# Patient Record
Sex: FEMALE | URBAN - METROPOLITAN AREA
[De-identification: names, ages, dates, MRNs, and addresses within clinical notes are randomized per-mention and may not be internally consistent; named-entity substitution may affect disease eponyms.]

---

## 2017-01-01 ENCOUNTER — HOME CARE VISIT (OUTPATIENT)
Dept: HOSPICE | Facility: HOSPICE | Age: 82
End: 2017-01-01
Payer: MEDICARE

## 2017-01-01 ENCOUNTER — APPOINTMENT (OUTPATIENT)
Dept: RADIOLOGY | Facility: MEDICAL CENTER | Age: 82
DRG: 871 | End: 2017-01-01
Attending: INTERNAL MEDICINE
Payer: MEDICARE

## 2017-01-01 ENCOUNTER — HOSPICE ADMISSION (OUTPATIENT)
Dept: HOSPICE | Facility: HOSPICE | Age: 82
End: 2017-01-01
Payer: MEDICARE

## 2017-01-01 ENCOUNTER — APPOINTMENT (OUTPATIENT)
Dept: RADIOLOGY | Facility: MEDICAL CENTER | Age: 82
DRG: 871 | End: 2017-01-01
Attending: HOSPITALIST
Payer: MEDICARE

## 2017-01-01 ENCOUNTER — APPOINTMENT (OUTPATIENT)
Dept: RADIOLOGY | Facility: MEDICAL CENTER | Age: 82
DRG: 871 | End: 2017-01-01
Payer: MEDICARE

## 2017-01-01 ENCOUNTER — RESOLUTE PROFESSIONAL BILLING HOSPITAL PROF FEE (OUTPATIENT)
Dept: HOSPITALIST | Facility: MEDICAL CENTER | Age: 82
End: 2017-01-01
Payer: MEDICARE

## 2017-01-01 ENCOUNTER — APPOINTMENT (OUTPATIENT)
Dept: RADIOLOGY | Facility: MEDICAL CENTER | Age: 82
DRG: 871 | End: 2017-01-01
Attending: EMERGENCY MEDICINE
Payer: MEDICARE

## 2017-01-01 ENCOUNTER — HOSPITAL ENCOUNTER (INPATIENT)
Facility: MEDICAL CENTER | Age: 82
LOS: 23 days | DRG: 871 | End: 2017-07-14
Attending: EMERGENCY MEDICINE | Admitting: HOSPITALIST
Payer: MEDICARE

## 2017-01-01 VITALS
HEIGHT: 63 IN | BODY MASS INDEX: 25.69 KG/M2 | HEART RATE: 79 BPM | TEMPERATURE: 95 F | RESPIRATION RATE: 22 BRPM | WEIGHT: 145 LBS

## 2017-01-01 VITALS
HEART RATE: 75 BPM | OXYGEN SATURATION: 95 % | RESPIRATION RATE: 18 BRPM | TEMPERATURE: 97.1 F | HEIGHT: 63 IN | BODY MASS INDEX: 25.86 KG/M2 | DIASTOLIC BLOOD PRESSURE: 55 MMHG | WEIGHT: 145.94 LBS | SYSTOLIC BLOOD PRESSURE: 133 MMHG

## 2017-01-01 VITALS — HEART RATE: 67 BPM | RESPIRATION RATE: 21 BRPM | TEMPERATURE: 96.8 F

## 2017-01-01 VITALS — TEMPERATURE: 96.8 F | RESPIRATION RATE: 16 BRPM | HEART RATE: 76 BPM

## 2017-01-01 VITALS — RESPIRATION RATE: 16 BRPM

## 2017-01-01 DIAGNOSIS — A41.9 SEPSIS, DUE TO UNSPECIFIED ORGANISM: ICD-10-CM

## 2017-01-01 LAB
AFP-TM SERPL-MCNC: 4 NG/ML (ref 0–9)
ALBUMIN SERPL BCP-MCNC: 1.7 G/DL (ref 3.2–4.9)
ALBUMIN SERPL BCP-MCNC: 1.7 G/DL (ref 3.2–4.9)
ALBUMIN SERPL BCP-MCNC: 1.8 G/DL (ref 3.2–4.9)
ALBUMIN SERPL BCP-MCNC: 1.9 G/DL (ref 3.2–4.9)
ALBUMIN SERPL BCP-MCNC: 1.9 G/DL (ref 3.2–4.9)
ALBUMIN SERPL BCP-MCNC: 2 G/DL (ref 3.2–4.9)
ALBUMIN SERPL BCP-MCNC: 2.1 G/DL (ref 3.2–4.9)
ALBUMIN SERPL BCP-MCNC: 2.1 G/DL (ref 3.2–4.9)
ALBUMIN SERPL BCP-MCNC: 2.2 G/DL (ref 3.2–4.9)
ALBUMIN SERPL BCP-MCNC: 2.2 G/DL (ref 3.2–4.9)
ALBUMIN SERPL BCP-MCNC: 2.3 G/DL (ref 3.2–4.9)
ALBUMIN SERPL BCP-MCNC: 2.6 G/DL (ref 3.2–4.9)
ALBUMIN SERPL BCP-MCNC: 2.7 G/DL (ref 3.2–4.9)
ALBUMIN SERPL BCP-MCNC: 2.8 G/DL (ref 3.2–4.9)
ALBUMIN SERPL BCP-MCNC: 2.8 G/DL (ref 3.2–4.9)
ALBUMIN SERPL BCP-MCNC: 2.9 G/DL (ref 3.2–4.9)
ALBUMIN SERPL BCP-MCNC: 3 G/DL (ref 3.2–4.9)
ALBUMIN SERPL BCP-MCNC: 3 G/DL (ref 3.2–4.9)
ALBUMIN SERPL BCP-MCNC: 3.1 G/DL (ref 3.2–4.9)
ALBUMIN/GLOB SERPL: 0.3 G/DL
ALBUMIN/GLOB SERPL: 0.4 G/DL
ALBUMIN/GLOB SERPL: 0.5 G/DL
ALBUMIN/GLOB SERPL: 0.7 G/DL
ALBUMIN/GLOB SERPL: 0.8 G/DL
ALBUMIN/GLOB SERPL: 0.9 G/DL
ALP SERPL-CCNC: 103 U/L (ref 30–99)
ALP SERPL-CCNC: 103 U/L (ref 30–99)
ALP SERPL-CCNC: 107 U/L (ref 30–99)
ALP SERPL-CCNC: 108 U/L (ref 30–99)
ALP SERPL-CCNC: 111 U/L (ref 30–99)
ALP SERPL-CCNC: 111 U/L (ref 30–99)
ALP SERPL-CCNC: 112 U/L (ref 30–99)
ALP SERPL-CCNC: 113 U/L (ref 30–99)
ALP SERPL-CCNC: 114 U/L (ref 30–99)
ALP SERPL-CCNC: 116 U/L (ref 30–99)
ALP SERPL-CCNC: 117 U/L (ref 30–99)
ALP SERPL-CCNC: 120 U/L (ref 30–99)
ALP SERPL-CCNC: 129 U/L (ref 30–99)
ALP SERPL-CCNC: 129 U/L (ref 30–99)
ALP SERPL-CCNC: 130 U/L (ref 30–99)
ALP SERPL-CCNC: 136 U/L (ref 30–99)
ALP SERPL-CCNC: 137 U/L (ref 30–99)
ALP SERPL-CCNC: 151 U/L (ref 30–99)
ALP SERPL-CCNC: 172 U/L (ref 30–99)
ALP SERPL-CCNC: 96 U/L (ref 30–99)
ALT SERPL-CCNC: 103 U/L (ref 2–50)
ALT SERPL-CCNC: 104 U/L (ref 2–50)
ALT SERPL-CCNC: 115 U/L (ref 2–50)
ALT SERPL-CCNC: 119 U/L (ref 2–50)
ALT SERPL-CCNC: 126 U/L (ref 2–50)
ALT SERPL-CCNC: 127 U/L (ref 2–50)
ALT SERPL-CCNC: 136 U/L (ref 2–50)
ALT SERPL-CCNC: 153 U/L (ref 2–50)
ALT SERPL-CCNC: 45 U/L (ref 2–50)
ALT SERPL-CCNC: 53 U/L (ref 2–50)
ALT SERPL-CCNC: 58 U/L (ref 2–50)
ALT SERPL-CCNC: 60 U/L (ref 2–50)
ALT SERPL-CCNC: 60 U/L (ref 2–50)
ALT SERPL-CCNC: 61 U/L (ref 2–50)
ALT SERPL-CCNC: 66 U/L (ref 2–50)
ALT SERPL-CCNC: 70 U/L (ref 2–50)
ALT SERPL-CCNC: 78 U/L (ref 2–50)
ALT SERPL-CCNC: 88 U/L (ref 2–50)
ALT SERPL-CCNC: 93 U/L (ref 2–50)
ALT SERPL-CCNC: 95 U/L (ref 2–50)
AMMONIA PLAS-SCNC: 31 UMOL/L (ref 11–45)
AMMONIA PLAS-SCNC: 34 UMOL/L (ref 11–45)
AMMONIA PLAS-SCNC: 35 UMOL/L (ref 11–45)
AMMONIA PLAS-SCNC: 43 UMOL/L (ref 11–45)
AMMONIA PLAS-SCNC: 46 UMOL/L (ref 11–45)
AMMONIA PLAS-SCNC: 52 UMOL/L (ref 11–45)
AMORPH CRY #/AREA URNS HPF: PRESENT /HPF
AMORPH CRY #/AREA URNS HPF: PRESENT /HPF
AMYLASE FLD-CCNC: 14 U/L
ANION GAP SERPL CALC-SCNC: 11 MMOL/L (ref 0–11.9)
ANION GAP SERPL CALC-SCNC: 11 MMOL/L (ref 0–11.9)
ANION GAP SERPL CALC-SCNC: 12 MMOL/L (ref 0–11.9)
ANION GAP SERPL CALC-SCNC: 12 MMOL/L (ref 0–11.9)
ANION GAP SERPL CALC-SCNC: 4 MMOL/L (ref 0–11.9)
ANION GAP SERPL CALC-SCNC: 5 MMOL/L (ref 0–11.9)
ANION GAP SERPL CALC-SCNC: 6 MMOL/L (ref 0–11.9)
ANION GAP SERPL CALC-SCNC: 7 MMOL/L (ref 0–11.9)
ANION GAP SERPL CALC-SCNC: 7 MMOL/L (ref 0–11.9)
ANION GAP SERPL CALC-SCNC: 8 MMOL/L (ref 0–11.9)
ANION GAP SERPL CALC-SCNC: 9 MMOL/L (ref 0–11.9)
ANION GAP SERPL CALC-SCNC: 9 MMOL/L (ref 0–11.9)
ANISOCYTOSIS BLD QL SMEAR: ABNORMAL
APPEARANCE FLD: NORMAL
APPEARANCE FLD: NORMAL
APPEARANCE UR: ABNORMAL
APPEARANCE UR: ABNORMAL
AST SERPL-CCNC: 103 U/L (ref 12–45)
AST SERPL-CCNC: 104 U/L (ref 12–45)
AST SERPL-CCNC: 114 U/L (ref 12–45)
AST SERPL-CCNC: 116 U/L (ref 12–45)
AST SERPL-CCNC: 132 U/L (ref 12–45)
AST SERPL-CCNC: 133 U/L (ref 12–45)
AST SERPL-CCNC: 136 U/L (ref 12–45)
AST SERPL-CCNC: 155 U/L (ref 12–45)
AST SERPL-CCNC: 47 U/L (ref 12–45)
AST SERPL-CCNC: 55 U/L (ref 12–45)
AST SERPL-CCNC: 55 U/L (ref 12–45)
AST SERPL-CCNC: 69 U/L (ref 12–45)
AST SERPL-CCNC: 70 U/L (ref 12–45)
AST SERPL-CCNC: 78 U/L (ref 12–45)
AST SERPL-CCNC: 84 U/L (ref 12–45)
AST SERPL-CCNC: 84 U/L (ref 12–45)
AST SERPL-CCNC: 85 U/L (ref 12–45)
AST SERPL-CCNC: 88 U/L (ref 12–45)
AST SERPL-CCNC: 93 U/L (ref 12–45)
AST SERPL-CCNC: 96 U/L (ref 12–45)
BACTERIA #/AREA URNS HPF: ABNORMAL /HPF
BACTERIA #/AREA URNS HPF: ABNORMAL /HPF
BACTERIA BLD CULT: ABNORMAL
BACTERIA BLD CULT: NORMAL
BACTERIA FLD AEROBE CULT: NORMAL
BACTERIA FLD AEROBE CULT: NORMAL
BACTERIA UR CULT: NORMAL
BACTERIA WND AEROBE CULT: NORMAL
BASO STIPL BLD QL SMEAR: NORMAL
BASOPHILS # BLD AUTO: 0 % (ref 0–1.8)
BASOPHILS # BLD AUTO: 0.1 % (ref 0–1.8)
BASOPHILS # BLD AUTO: 0.2 % (ref 0–1.8)
BASOPHILS # BLD AUTO: 0.3 % (ref 0–1.8)
BASOPHILS # BLD AUTO: 0.3 % (ref 0–1.8)
BASOPHILS # BLD AUTO: 0.4 % (ref 0–1.8)
BASOPHILS # BLD AUTO: 0.6 % (ref 0–1.8)
BASOPHILS # BLD: 0 K/UL (ref 0–0.12)
BASOPHILS # BLD: 0.02 K/UL (ref 0–0.12)
BASOPHILS # BLD: 0.02 K/UL (ref 0–0.12)
BASOPHILS # BLD: 0.04 K/UL (ref 0–0.12)
BASOPHILS # BLD: 0.05 K/UL (ref 0–0.12)
BASOPHILS # BLD: 0.05 K/UL (ref 0–0.12)
BASOPHILS # BLD: 0.08 K/UL (ref 0–0.12)
BILIRUB CONJ SERPL-MCNC: 5.9 MG/DL (ref 0.1–0.5)
BILIRUB INDIRECT SERPL-MCNC: 4.3 MG/DL (ref 0–1)
BILIRUB SERPL-MCNC: 10.2 MG/DL (ref 0.1–1.5)
BILIRUB SERPL-MCNC: 6.3 MG/DL (ref 0.1–1.5)
BILIRUB SERPL-MCNC: 6.7 MG/DL (ref 0.1–1.5)
BILIRUB SERPL-MCNC: 6.8 MG/DL (ref 0.1–1.5)
BILIRUB SERPL-MCNC: 6.9 MG/DL (ref 0.1–1.5)
BILIRUB SERPL-MCNC: 7 MG/DL (ref 0.1–1.5)
BILIRUB SERPL-MCNC: 7.5 MG/DL (ref 0.1–1.5)
BILIRUB SERPL-MCNC: 7.6 MG/DL (ref 0.1–1.5)
BILIRUB SERPL-MCNC: 7.9 MG/DL (ref 0.1–1.5)
BILIRUB SERPL-MCNC: 8 MG/DL (ref 0.1–1.5)
BILIRUB SERPL-MCNC: 8.1 MG/DL (ref 0.1–1.5)
BILIRUB SERPL-MCNC: 8.2 MG/DL (ref 0.1–1.5)
BILIRUB SERPL-MCNC: 8.3 MG/DL (ref 0.1–1.5)
BILIRUB SERPL-MCNC: 8.5 MG/DL (ref 0.1–1.5)
BILIRUB SERPL-MCNC: 8.5 MG/DL (ref 0.1–1.5)
BILIRUB SERPL-MCNC: 8.6 MG/DL (ref 0.1–1.5)
BILIRUB SERPL-MCNC: 8.8 MG/DL (ref 0.1–1.5)
BILIRUB SERPL-MCNC: 9.5 MG/DL (ref 0.1–1.5)
BILIRUB UR QL CFM: NEGATIVE
BILIRUB UR QL STRIP.AUTO: NEGATIVE
BNP SERPL-MCNC: 310 PG/ML (ref 0–100)
BODY FLD TYPE: NORMAL
BUN SERPL-MCNC: 26 MG/DL (ref 8–22)
BUN SERPL-MCNC: 29 MG/DL (ref 8–22)
BUN SERPL-MCNC: 31 MG/DL (ref 8–22)
BUN SERPL-MCNC: 32 MG/DL (ref 8–22)
BUN SERPL-MCNC: 33 MG/DL (ref 8–22)
BUN SERPL-MCNC: 36 MG/DL (ref 8–22)
BUN SERPL-MCNC: 38 MG/DL (ref 8–22)
BUN SERPL-MCNC: 39 MG/DL (ref 8–22)
BUN SERPL-MCNC: 39 MG/DL (ref 8–22)
BUN SERPL-MCNC: 40 MG/DL (ref 8–22)
BUN SERPL-MCNC: 42 MG/DL (ref 8–22)
BUN SERPL-MCNC: 43 MG/DL (ref 8–22)
BUN SERPL-MCNC: 45 MG/DL (ref 8–22)
BUN SERPL-MCNC: 48 MG/DL (ref 8–22)
BUN SERPL-MCNC: 48 MG/DL (ref 8–22)
BUN SERPL-MCNC: 52 MG/DL (ref 8–22)
BUN SERPL-MCNC: 55 MG/DL (ref 8–22)
BUN SERPL-MCNC: 57 MG/DL (ref 8–22)
BUN SERPL-MCNC: 60 MG/DL (ref 8–22)
BUN SERPL-MCNC: 63 MG/DL (ref 8–22)
BUN SERPL-MCNC: 69 MG/DL (ref 8–22)
BUN SERPL-MCNC: 84 MG/DL (ref 8–22)
C DIFF DNA SPEC QL NAA+PROBE: NEGATIVE
C DIFF TOX A+B STL QL IA: NEGATIVE
C DIFF TOX GENS STL QL NAA+PROBE: NEGATIVE
CALCIUM SERPL-MCNC: 7.7 MG/DL (ref 8.5–10.5)
CALCIUM SERPL-MCNC: 8 MG/DL (ref 8.5–10.5)
CALCIUM SERPL-MCNC: 8.1 MG/DL (ref 8.5–10.5)
CALCIUM SERPL-MCNC: 8.1 MG/DL (ref 8.5–10.5)
CALCIUM SERPL-MCNC: 8.2 MG/DL (ref 8.5–10.5)
CALCIUM SERPL-MCNC: 8.3 MG/DL (ref 8.5–10.5)
CALCIUM SERPL-MCNC: 8.3 MG/DL (ref 8.5–10.5)
CALCIUM SERPL-MCNC: 8.5 MG/DL (ref 8.5–10.5)
CALCIUM SERPL-MCNC: 8.5 MG/DL (ref 8.5–10.5)
CALCIUM SERPL-MCNC: 8.6 MG/DL (ref 8.5–10.5)
CALCIUM SERPL-MCNC: 8.7 MG/DL (ref 8.5–10.5)
CALCIUM SERPL-MCNC: 8.8 MG/DL (ref 8.5–10.5)
CALCIUM SERPL-MCNC: 8.9 MG/DL (ref 8.5–10.5)
CALCIUM SERPL-MCNC: 9 MG/DL (ref 8.5–10.5)
CALCIUM SERPL-MCNC: 9.3 MG/DL (ref 8.5–10.5)
CFT BLD TEG: 4.3 MIN (ref 5–10)
CFT BLD TEG: 4.6 MIN (ref 5–10)
CFT BLD TEG: 5.5 MIN (ref 5–10)
CHLORIDE SERPL-SCNC: 101 MMOL/L (ref 96–112)
CHLORIDE SERPL-SCNC: 103 MMOL/L (ref 96–112)
CHLORIDE SERPL-SCNC: 104 MMOL/L (ref 96–112)
CHLORIDE SERPL-SCNC: 105 MMOL/L (ref 96–112)
CHLORIDE SERPL-SCNC: 106 MMOL/L (ref 96–112)
CHLORIDE SERPL-SCNC: 107 MMOL/L (ref 96–112)
CHLORIDE SERPL-SCNC: 108 MMOL/L (ref 96–112)
CHLORIDE SERPL-SCNC: 109 MMOL/L (ref 96–112)
CHLORIDE SERPL-SCNC: 110 MMOL/L (ref 96–112)
CHLORIDE SERPL-SCNC: 113 MMOL/L (ref 96–112)
CHLORIDE UR-SCNC: 18 MMOL/L
CHLORIDE UR-SCNC: <15 MMOL/L
CK SERPL-CCNC: 92 U/L (ref 0–154)
CK SERPL-CCNC: 96 U/L (ref 0–154)
CLOT ANGLE BLD TEG: 63 DEGREES (ref 53–72)
CLOT ANGLE BLD TEG: 63.6 DEGREES (ref 53–72)
CLOT ANGLE BLD TEG: 68.3 DEGREES (ref 53–72)
CLOT LYSIS 30M P MA LENFR BLD TEG: 0 % (ref 0–8)
CO2 SERPL-SCNC: 15 MMOL/L (ref 20–33)
CO2 SERPL-SCNC: 17 MMOL/L (ref 20–33)
CO2 SERPL-SCNC: 17 MMOL/L (ref 20–33)
CO2 SERPL-SCNC: 20 MMOL/L (ref 20–33)
CO2 SERPL-SCNC: 21 MMOL/L (ref 20–33)
CO2 SERPL-SCNC: 22 MMOL/L (ref 20–33)
CO2 SERPL-SCNC: 23 MMOL/L (ref 20–33)
CO2 SERPL-SCNC: 23 MMOL/L (ref 20–33)
CO2 SERPL-SCNC: 24 MMOL/L (ref 20–33)
CO2 SERPL-SCNC: 25 MMOL/L (ref 20–33)
CO2 SERPL-SCNC: 25 MMOL/L (ref 20–33)
CO2 SERPL-SCNC: 27 MMOL/L (ref 20–33)
COLOR FLD: YELLOW
COLOR FLD: YELLOW
COLOR UR: ABNORMAL
COLOR UR: ABNORMAL
COMMENT 1642: NORMAL
COMMENT 1642: NORMAL
CREAT SERPL-MCNC: 1.06 MG/DL (ref 0.5–1.4)
CREAT SERPL-MCNC: 1.15 MG/DL (ref 0.5–1.4)
CREAT SERPL-MCNC: 1.21 MG/DL (ref 0.5–1.4)
CREAT SERPL-MCNC: 1.31 MG/DL (ref 0.5–1.4)
CREAT SERPL-MCNC: 1.51 MG/DL (ref 0.5–1.4)
CREAT SERPL-MCNC: 1.72 MG/DL (ref 0.5–1.4)
CREAT SERPL-MCNC: 1.95 MG/DL (ref 0.5–1.4)
CREAT SERPL-MCNC: 2.04 MG/DL (ref 0.5–1.4)
CREAT SERPL-MCNC: 2.28 MG/DL (ref 0.5–1.4)
CREAT SERPL-MCNC: 2.32 MG/DL (ref 0.5–1.4)
CREAT SERPL-MCNC: 2.33 MG/DL (ref 0.5–1.4)
CREAT SERPL-MCNC: 2.34 MG/DL (ref 0.5–1.4)
CREAT SERPL-MCNC: 2.34 MG/DL (ref 0.5–1.4)
CREAT SERPL-MCNC: 2.45 MG/DL (ref 0.5–1.4)
CREAT SERPL-MCNC: 2.54 MG/DL (ref 0.5–1.4)
CREAT SERPL-MCNC: 2.57 MG/DL (ref 0.5–1.4)
CREAT SERPL-MCNC: 2.85 MG/DL (ref 0.5–1.4)
CREAT SERPL-MCNC: 2.9 MG/DL (ref 0.5–1.4)
CREAT SERPL-MCNC: 2.95 MG/DL (ref 0.5–1.4)
CREAT SERPL-MCNC: 3.06 MG/DL (ref 0.5–1.4)
CREAT SERPL-MCNC: 3.11 MG/DL (ref 0.5–1.4)
CREAT SERPL-MCNC: 3.68 MG/DL (ref 0.5–1.4)
CREAT UR-MCNC: 109.7 MG/DL
CREAT UR-MCNC: 99.7 MG/DL
CSF COMMENTS 1658: NORMAL
CSF COMMENTS 1658: NORMAL
CT.EXTRINSIC BLD ROTEM: 1.6 MIN (ref 1–3)
CT.EXTRINSIC BLD ROTEM: 2.1 MIN (ref 1–3)
CT.EXTRINSIC BLD ROTEM: 2.4 MIN (ref 1–3)
EKG IMPRESSION: NORMAL
EOSINOPHIL # BLD AUTO: 0 K/UL (ref 0–0.51)
EOSINOPHIL # BLD AUTO: 0.02 K/UL (ref 0–0.51)
EOSINOPHIL # BLD AUTO: 0.09 K/UL (ref 0–0.51)
EOSINOPHIL # BLD AUTO: 0.1 K/UL (ref 0–0.51)
EOSINOPHIL # BLD AUTO: 0.1 K/UL (ref 0–0.51)
EOSINOPHIL # BLD AUTO: 0.11 K/UL (ref 0–0.51)
EOSINOPHIL # BLD AUTO: 0.11 K/UL (ref 0–0.51)
EOSINOPHIL # BLD AUTO: 0.12 K/UL (ref 0–0.51)
EOSINOPHIL # BLD AUTO: 0.12 K/UL (ref 0–0.51)
EOSINOPHIL # BLD AUTO: 0.14 K/UL (ref 0–0.51)
EOSINOPHIL # BLD AUTO: 0.2 K/UL (ref 0–0.51)
EOSINOPHIL # BLD AUTO: 0.22 K/UL (ref 0–0.51)
EOSINOPHIL # BLD AUTO: 0.23 K/UL (ref 0–0.51)
EOSINOPHIL # BLD AUTO: 0.26 K/UL (ref 0–0.51)
EOSINOPHIL # BLD AUTO: 0.31 K/UL (ref 0–0.51)
EOSINOPHIL # BLD AUTO: 0.33 K/UL (ref 0–0.51)
EOSINOPHIL NFR BLD: 0 % (ref 0–6.9)
EOSINOPHIL NFR BLD: 0.2 % (ref 0–6.9)
EOSINOPHIL NFR BLD: 0.8 % (ref 0–6.9)
EOSINOPHIL NFR BLD: 0.8 % (ref 0–6.9)
EOSINOPHIL NFR BLD: 0.9 % (ref 0–6.9)
EOSINOPHIL NFR BLD: 1.6 % (ref 0–6.9)
EOSINOPHIL NFR BLD: 1.7 % (ref 0–6.9)
EOSINOPHIL NFR BLD: 2.5 % (ref 0–6.9)
EOSINOPHIL NFR BLD: 2.6 % (ref 0–6.9)
EOSINOPHIL NFR BLD: 2.7 % (ref 0–6.9)
EOSINOPHIL NFR BLD: 2.7 % (ref 0–6.9)
EOSINOPHIL URNS QL MICRO: NORMAL
EPI CELLS #/AREA URNS HPF: ABNORMAL /HPF
ERYTHROCYTE [DISTWIDTH] IN BLOOD BY AUTOMATED COUNT: 64.5 FL (ref 35.9–50)
ERYTHROCYTE [DISTWIDTH] IN BLOOD BY AUTOMATED COUNT: 64.8 FL (ref 35.9–50)
ERYTHROCYTE [DISTWIDTH] IN BLOOD BY AUTOMATED COUNT: 65.5 FL (ref 35.9–50)
ERYTHROCYTE [DISTWIDTH] IN BLOOD BY AUTOMATED COUNT: 65.6 FL (ref 35.9–50)
ERYTHROCYTE [DISTWIDTH] IN BLOOD BY AUTOMATED COUNT: 66.5 FL (ref 35.9–50)
ERYTHROCYTE [DISTWIDTH] IN BLOOD BY AUTOMATED COUNT: 66.7 FL (ref 35.9–50)
ERYTHROCYTE [DISTWIDTH] IN BLOOD BY AUTOMATED COUNT: 68.3 FL (ref 35.9–50)
ERYTHROCYTE [DISTWIDTH] IN BLOOD BY AUTOMATED COUNT: 68.4 FL (ref 35.9–50)
ERYTHROCYTE [DISTWIDTH] IN BLOOD BY AUTOMATED COUNT: 69.2 FL (ref 35.9–50)
ERYTHROCYTE [DISTWIDTH] IN BLOOD BY AUTOMATED COUNT: 69.6 FL (ref 35.9–50)
ERYTHROCYTE [DISTWIDTH] IN BLOOD BY AUTOMATED COUNT: 69.7 FL (ref 35.9–50)
ERYTHROCYTE [DISTWIDTH] IN BLOOD BY AUTOMATED COUNT: 70.9 FL (ref 35.9–50)
ERYTHROCYTE [DISTWIDTH] IN BLOOD BY AUTOMATED COUNT: 73.7 FL (ref 35.9–50)
ERYTHROCYTE [DISTWIDTH] IN BLOOD BY AUTOMATED COUNT: 74 FL (ref 35.9–50)
ERYTHROCYTE [DISTWIDTH] IN BLOOD BY AUTOMATED COUNT: 74.3 FL (ref 35.9–50)
ERYTHROCYTE [DISTWIDTH] IN BLOOD BY AUTOMATED COUNT: 75.3 FL (ref 35.9–50)
ERYTHROCYTE [DISTWIDTH] IN BLOOD BY AUTOMATED COUNT: 75.9 FL (ref 35.9–50)
ERYTHROCYTE [DISTWIDTH] IN BLOOD BY AUTOMATED COUNT: 76.2 FL (ref 35.9–50)
ERYTHROCYTE [DISTWIDTH] IN BLOOD BY AUTOMATED COUNT: 76.8 FL (ref 35.9–50)
ERYTHROCYTE [DISTWIDTH] IN BLOOD BY AUTOMATED COUNT: 77.7 FL (ref 35.9–50)
ERYTHROCYTE [DISTWIDTH] IN BLOOD BY AUTOMATED COUNT: 77.8 FL (ref 35.9–50)
ERYTHROCYTE [DISTWIDTH] IN BLOOD BY AUTOMATED COUNT: 78.3 FL (ref 35.9–50)
FERRITIN SERPL-MCNC: 142 NG/ML (ref 10–291)
FOLATE SERPL-MCNC: 16.7 NG/ML
GFR SERPL CREATININE-BSD FRML MDRD: 12 ML/MIN/1.73 M 2
GFR SERPL CREATININE-BSD FRML MDRD: 14 ML/MIN/1.73 M 2
GFR SERPL CREATININE-BSD FRML MDRD: 15 ML/MIN/1.73 M 2
GFR SERPL CREATININE-BSD FRML MDRD: 15 ML/MIN/1.73 M 2
GFR SERPL CREATININE-BSD FRML MDRD: 16 ML/MIN/1.73 M 2
GFR SERPL CREATININE-BSD FRML MDRD: 16 ML/MIN/1.73 M 2
GFR SERPL CREATININE-BSD FRML MDRD: 18 ML/MIN/1.73 M 2
GFR SERPL CREATININE-BSD FRML MDRD: 18 ML/MIN/1.73 M 2
GFR SERPL CREATININE-BSD FRML MDRD: 19 ML/MIN/1.73 M 2
GFR SERPL CREATININE-BSD FRML MDRD: 20 ML/MIN/1.73 M 2
GFR SERPL CREATININE-BSD FRML MDRD: 21 ML/MIN/1.73 M 2
GFR SERPL CREATININE-BSD FRML MDRD: 23 ML/MIN/1.73 M 2
GFR SERPL CREATININE-BSD FRML MDRD: 25 ML/MIN/1.73 M 2
GFR SERPL CREATININE-BSD FRML MDRD: 28 ML/MIN/1.73 M 2
GFR SERPL CREATININE-BSD FRML MDRD: 33 ML/MIN/1.73 M 2
GFR SERPL CREATININE-BSD FRML MDRD: 39 ML/MIN/1.73 M 2
GFR SERPL CREATININE-BSD FRML MDRD: 43 ML/MIN/1.73 M 2
GFR SERPL CREATININE-BSD FRML MDRD: 45 ML/MIN/1.73 M 2
GFR SERPL CREATININE-BSD FRML MDRD: 50 ML/MIN/1.73 M 2
GLOBULIN SER CALC-MCNC: 3.4 G/DL (ref 1.9–3.5)
GLOBULIN SER CALC-MCNC: 3.5 G/DL (ref 1.9–3.5)
GLOBULIN SER CALC-MCNC: 3.5 G/DL (ref 1.9–3.5)
GLOBULIN SER CALC-MCNC: 3.6 G/DL (ref 1.9–3.5)
GLOBULIN SER CALC-MCNC: 4 G/DL (ref 1.9–3.5)
GLOBULIN SER CALC-MCNC: 4 G/DL (ref 1.9–3.5)
GLOBULIN SER CALC-MCNC: 4.2 G/DL (ref 1.9–3.5)
GLOBULIN SER CALC-MCNC: 4.3 G/DL (ref 1.9–3.5)
GLOBULIN SER CALC-MCNC: 4.9 G/DL (ref 1.9–3.5)
GLOBULIN SER CALC-MCNC: 4.9 G/DL (ref 1.9–3.5)
GLOBULIN SER CALC-MCNC: 5.1 G/DL (ref 1.9–3.5)
GLOBULIN SER CALC-MCNC: 5.1 G/DL (ref 1.9–3.5)
GLOBULIN SER CALC-MCNC: 5.3 G/DL (ref 1.9–3.5)
GLOBULIN SER CALC-MCNC: 5.3 G/DL (ref 1.9–3.5)
GLOBULIN SER CALC-MCNC: 5.5 G/DL (ref 1.9–3.5)
GLOBULIN SER CALC-MCNC: 5.5 G/DL (ref 1.9–3.5)
GLOBULIN SER CALC-MCNC: 5.7 G/DL (ref 1.9–3.5)
GLOBULIN SER CALC-MCNC: 5.8 G/DL (ref 1.9–3.5)
GLOBULIN SER CALC-MCNC: 6 G/DL (ref 1.9–3.5)
GLOBULIN SER CALC-MCNC: 6.5 G/DL (ref 1.9–3.5)
GLUCOSE BLD-MCNC: 105 MG/DL (ref 65–99)
GLUCOSE FLD-MCNC: 94 MG/DL
GLUCOSE SERPL-MCNC: 100 MG/DL (ref 65–99)
GLUCOSE SERPL-MCNC: 101 MG/DL (ref 65–99)
GLUCOSE SERPL-MCNC: 103 MG/DL (ref 65–99)
GLUCOSE SERPL-MCNC: 104 MG/DL (ref 65–99)
GLUCOSE SERPL-MCNC: 106 MG/DL (ref 65–99)
GLUCOSE SERPL-MCNC: 106 MG/DL (ref 65–99)
GLUCOSE SERPL-MCNC: 107 MG/DL (ref 65–99)
GLUCOSE SERPL-MCNC: 67 MG/DL (ref 65–99)
GLUCOSE SERPL-MCNC: 74 MG/DL (ref 65–99)
GLUCOSE SERPL-MCNC: 78 MG/DL (ref 65–99)
GLUCOSE SERPL-MCNC: 80 MG/DL (ref 65–99)
GLUCOSE SERPL-MCNC: 82 MG/DL (ref 65–99)
GLUCOSE SERPL-MCNC: 86 MG/DL (ref 65–99)
GLUCOSE SERPL-MCNC: 89 MG/DL (ref 65–99)
GLUCOSE SERPL-MCNC: 90 MG/DL (ref 65–99)
GLUCOSE SERPL-MCNC: 90 MG/DL (ref 65–99)
GLUCOSE SERPL-MCNC: 91 MG/DL (ref 65–99)
GLUCOSE SERPL-MCNC: 92 MG/DL (ref 65–99)
GLUCOSE SERPL-MCNC: 93 MG/DL (ref 65–99)
GLUCOSE SERPL-MCNC: 94 MG/DL (ref 65–99)
GLUCOSE SERPL-MCNC: 96 MG/DL (ref 65–99)
GLUCOSE SERPL-MCNC: 96 MG/DL (ref 65–99)
GLUCOSE UR STRIP.AUTO-MCNC: NEGATIVE MG/DL
GLUCOSE UR STRIP.AUTO-MCNC: NEGATIVE MG/DL
GRAM STN SPEC: NORMAL
HAV IGM SERPL QL IA: NEGATIVE
HBV CORE IGM SER QL: NEGATIVE
HBV SURFACE AB SERPL IA-ACNC: <3.1 MIU/ML (ref 0–10)
HBV SURFACE AG SER QL: NEGATIVE
HCT VFR BLD AUTO: 25.1 % (ref 37–47)
HCT VFR BLD AUTO: 25.2 % (ref 37–47)
HCT VFR BLD AUTO: 25.6 % (ref 37–47)
HCT VFR BLD AUTO: 25.7 % (ref 37–47)
HCT VFR BLD AUTO: 26 % (ref 37–47)
HCT VFR BLD AUTO: 27.2 % (ref 37–47)
HCT VFR BLD AUTO: 27.5 % (ref 37–47)
HCT VFR BLD AUTO: 28.3 % (ref 37–47)
HCT VFR BLD AUTO: 28.3 % (ref 37–47)
HCT VFR BLD AUTO: 28.5 % (ref 37–47)
HCT VFR BLD AUTO: 28.6 % (ref 37–47)
HCT VFR BLD AUTO: 28.7 % (ref 37–47)
HCT VFR BLD AUTO: 28.7 % (ref 37–47)
HCT VFR BLD AUTO: 29 % (ref 37–47)
HCT VFR BLD AUTO: 29 % (ref 37–47)
HCT VFR BLD AUTO: 29.4 % (ref 37–47)
HCT VFR BLD AUTO: 29.9 % (ref 37–47)
HCT VFR BLD AUTO: 31 % (ref 37–47)
HCT VFR BLD AUTO: 33 % (ref 37–47)
HCT VFR BLD AUTO: 35 % (ref 37–47)
HCT VFR BLD AUTO: 35.7 % (ref 37–47)
HCT VFR BLD AUTO: 36.5 % (ref 37–47)
HCV AB SER QL: REACTIVE
HCV RNA SERPL NAA+PROBE-ACNC: ABNORMAL IU/ML
HCV RNA SERPL NAA+PROBE-ACNC: ABNORMAL IU/ML
HCV RNA SERPL NAA+PROBE-LOG IU: 3.4 LOG IU
HCV RNA SERPL NAA+PROBE-LOG IU: 3.5 LOG IU
HCV RNA SERPL QL NAA+PROBE: DETECTED
HCV RNA SERPL QL NAA+PROBE: DETECTED
HEMOCCULT STL QL: POSITIVE
HEPIND PLTAB  51052: POSITIVE
HGB BLD-MCNC: 10.3 G/DL (ref 12–16)
HGB BLD-MCNC: 10.8 G/DL (ref 12–16)
HGB BLD-MCNC: 10.8 G/DL (ref 12–16)
HGB BLD-MCNC: 11.4 G/DL (ref 12–16)
HGB BLD-MCNC: 7.8 G/DL (ref 12–16)
HGB BLD-MCNC: 7.8 G/DL (ref 12–16)
HGB BLD-MCNC: 8 G/DL (ref 12–16)
HGB BLD-MCNC: 8 G/DL (ref 12–16)
HGB BLD-MCNC: 8.2 G/DL (ref 12–16)
HGB BLD-MCNC: 8.4 G/DL (ref 12–16)
HGB BLD-MCNC: 8.6 G/DL (ref 12–16)
HGB BLD-MCNC: 8.8 G/DL (ref 12–16)
HGB BLD-MCNC: 8.8 G/DL (ref 12–16)
HGB BLD-MCNC: 8.9 G/DL (ref 12–16)
HGB BLD-MCNC: 9 G/DL (ref 12–16)
HGB BLD-MCNC: 9 G/DL (ref 12–16)
HGB BLD-MCNC: 9.1 G/DL (ref 12–16)
HGB BLD-MCNC: 9.2 G/DL (ref 12–16)
HGB BLD-MCNC: 9.3 G/DL (ref 12–16)
HGB BLD-MCNC: 9.6 G/DL (ref 12–16)
HISTIOCYTES NFR FLD: 1 %
HYALINE CASTS #/AREA URNS LPF: ABNORMAL /LPF
HYPOCHROMIA BLD QL SMEAR: ABNORMAL
HYPOCHROMIA BLD QL SMEAR: ABNORMAL
IMM GRANULOCYTES # BLD AUTO: 0.04 K/UL (ref 0–0.11)
IMM GRANULOCYTES # BLD AUTO: 0.04 K/UL (ref 0–0.11)
IMM GRANULOCYTES # BLD AUTO: 0.05 K/UL (ref 0–0.11)
IMM GRANULOCYTES # BLD AUTO: 0.05 K/UL (ref 0–0.11)
IMM GRANULOCYTES # BLD AUTO: 0.07 K/UL (ref 0–0.11)
IMM GRANULOCYTES # BLD AUTO: 0.08 K/UL (ref 0–0.11)
IMM GRANULOCYTES # BLD AUTO: 0.09 K/UL (ref 0–0.11)
IMM GRANULOCYTES # BLD AUTO: 0.11 K/UL (ref 0–0.11)
IMM GRANULOCYTES # BLD AUTO: 0.11 K/UL (ref 0–0.11)
IMM GRANULOCYTES NFR BLD AUTO: 0.3 % (ref 0–0.9)
IMM GRANULOCYTES NFR BLD AUTO: 0.4 % (ref 0–0.9)
IMM GRANULOCYTES NFR BLD AUTO: 0.4 % (ref 0–0.9)
IMM GRANULOCYTES NFR BLD AUTO: 0.5 % (ref 0–0.9)
IMM GRANULOCYTES NFR BLD AUTO: 0.5 % (ref 0–0.9)
IMM GRANULOCYTES NFR BLD AUTO: 0.6 % (ref 0–0.9)
IMM GRANULOCYTES NFR BLD AUTO: 0.7 % (ref 0–0.9)
IMM GRANULOCYTES NFR BLD AUTO: 0.9 % (ref 0–0.9)
IMM GRANULOCYTES NFR BLD AUTO: 0.9 % (ref 0–0.9)
INR PPP: 2.04 (ref 0.87–1.13)
INR PPP: 2.05 (ref 0.87–1.13)
INR PPP: 2.13 (ref 0.87–1.13)
INR PPP: 2.15 (ref 0.87–1.13)
INR PPP: 2.16 (ref 0.87–1.13)
INR PPP: 2.29 (ref 0.87–1.13)
INR PPP: 2.32 (ref 0.87–1.13)
IRON SATN MFR SERPL: 31 % (ref 15–55)
IRON SERPL-MCNC: 47 UG/DL (ref 40–170)
KETONES UR STRIP.AUTO-MCNC: NEGATIVE MG/DL
KETONES UR STRIP.AUTO-MCNC: NEGATIVE MG/DL
LACTATE BLD-SCNC: 1.8 MMOL/L (ref 0.5–2)
LACTATE BLD-SCNC: 1.8 MMOL/L (ref 0.5–2)
LACTATE BLD-SCNC: 1.9 MMOL/L (ref 0.5–2)
LACTATE BLD-SCNC: 1.9 MMOL/L (ref 0.5–2)
LACTATE BLD-SCNC: 2.1 MMOL/L (ref 0.5–2)
LACTATE BLD-SCNC: 2.2 MMOL/L (ref 0.5–2)
LACTATE BLD-SCNC: 2.2 MMOL/L (ref 0.5–2)
LACTATE BLD-SCNC: 2.4 MMOL/L (ref 0.5–2)
LACTATE BLD-SCNC: 2.4 MMOL/L (ref 0.5–2)
LACTATE BLD-SCNC: 2.6 MMOL/L (ref 0.5–2)
LACTATE BLD-SCNC: 2.7 MMOL/L (ref 0.5–2)
LACTATE BLD-SCNC: 2.8 MMOL/L (ref 0.5–2)
LEUKOCYTE ESTERASE UR QL STRIP.AUTO: ABNORMAL
LEUKOCYTE ESTERASE UR QL STRIP.AUTO: NEGATIVE
LIPASE SERPL-CCNC: 159 U/L (ref 11–82)
LV EJECT FRACT  99904: 60
LYMPHOCYTES # BLD AUTO: 0.14 K/UL (ref 1–4.8)
LYMPHOCYTES # BLD AUTO: 0.24 K/UL (ref 1–4.8)
LYMPHOCYTES # BLD AUTO: 0.33 K/UL (ref 1–4.8)
LYMPHOCYTES # BLD AUTO: 0.33 K/UL (ref 1–4.8)
LYMPHOCYTES # BLD AUTO: 0.34 K/UL (ref 1–4.8)
LYMPHOCYTES # BLD AUTO: 0.49 K/UL (ref 1–4.8)
LYMPHOCYTES # BLD AUTO: 0.59 K/UL (ref 1–4.8)
LYMPHOCYTES # BLD AUTO: 0.62 K/UL (ref 1–4.8)
LYMPHOCYTES # BLD AUTO: 0.67 K/UL (ref 1–4.8)
LYMPHOCYTES # BLD AUTO: 0.7 K/UL (ref 1–4.8)
LYMPHOCYTES # BLD AUTO: 0.72 K/UL (ref 1–4.8)
LYMPHOCYTES # BLD AUTO: 0.76 K/UL (ref 1–4.8)
LYMPHOCYTES # BLD AUTO: 0.77 K/UL (ref 1–4.8)
LYMPHOCYTES # BLD AUTO: 0.8 K/UL (ref 1–4.8)
LYMPHOCYTES # BLD AUTO: 0.81 K/UL (ref 1–4.8)
LYMPHOCYTES # BLD AUTO: 0.81 K/UL (ref 1–4.8)
LYMPHOCYTES # BLD AUTO: 0.89 K/UL (ref 1–4.8)
LYMPHOCYTES # BLD AUTO: 0.94 K/UL (ref 1–4.8)
LYMPHOCYTES # BLD AUTO: 1.17 K/UL (ref 1–4.8)
LYMPHOCYTES NFR BLD: 0.9 % (ref 22–41)
LYMPHOCYTES NFR BLD: 2.6 % (ref 22–41)
LYMPHOCYTES NFR BLD: 4.3 % (ref 22–41)
LYMPHOCYTES NFR BLD: 4.4 % (ref 22–41)
LYMPHOCYTES NFR BLD: 4.5 % (ref 22–41)
LYMPHOCYTES NFR BLD: 5.3 % (ref 22–41)
LYMPHOCYTES NFR BLD: 5.4 % (ref 22–41)
LYMPHOCYTES NFR BLD: 6 % (ref 22–41)
LYMPHOCYTES NFR BLD: 6.2 % (ref 22–41)
LYMPHOCYTES NFR BLD: 6.3 % (ref 22–41)
LYMPHOCYTES NFR BLD: 6.3 % (ref 22–41)
LYMPHOCYTES NFR BLD: 6.4 % (ref 22–41)
LYMPHOCYTES NFR BLD: 6.5 % (ref 22–41)
LYMPHOCYTES NFR BLD: 6.9 % (ref 22–41)
LYMPHOCYTES NFR BLD: 9.4 % (ref 22–41)
LYMPHOCYTES NFR BLD: 9.5 % (ref 22–41)
LYMPHOCYTES NFR FLD: 10 %
LYMPHOCYTES NFR FLD: 32 %
MACROCYTES BLD QL SMEAR: ABNORMAL
MAGNESIUM SERPL-MCNC: 1.8 MG/DL (ref 1.5–2.5)
MAGNESIUM SERPL-MCNC: 1.8 MG/DL (ref 1.5–2.5)
MAGNESIUM SERPL-MCNC: 1.9 MG/DL (ref 1.5–2.5)
MAGNESIUM SERPL-MCNC: 2.1 MG/DL (ref 1.5–2.5)
MAGNESIUM SERPL-MCNC: 2.3 MG/DL (ref 1.5–2.5)
MANUAL DIFF BLD: NORMAL
MCF BLD TEG: 46.7 MM (ref 50–70)
MCF BLD TEG: 51.9 MM (ref 50–70)
MCF BLD TEG: 55.8 MM (ref 50–70)
MCH RBC QN AUTO: 32.8 PG (ref 27–33)
MCH RBC QN AUTO: 32.9 PG (ref 27–33)
MCH RBC QN AUTO: 33 PG (ref 27–33)
MCH RBC QN AUTO: 33.1 PG (ref 27–33)
MCH RBC QN AUTO: 33.2 PG (ref 27–33)
MCH RBC QN AUTO: 33.3 PG (ref 27–33)
MCH RBC QN AUTO: 33.3 PG (ref 27–33)
MCH RBC QN AUTO: 33.5 PG (ref 27–33)
MCH RBC QN AUTO: 33.7 PG (ref 27–33)
MCH RBC QN AUTO: 33.7 PG (ref 27–33)
MCH RBC QN AUTO: 33.8 PG (ref 27–33)
MCH RBC QN AUTO: 33.9 PG (ref 27–33)
MCH RBC QN AUTO: 33.9 PG (ref 27–33)
MCHC RBC AUTO-ENTMCNC: 30.3 G/DL (ref 33.6–35)
MCHC RBC AUTO-ENTMCNC: 30.7 G/DL (ref 33.6–35)
MCHC RBC AUTO-ENTMCNC: 30.8 G/DL (ref 33.6–35)
MCHC RBC AUTO-ENTMCNC: 30.8 G/DL (ref 33.6–35)
MCHC RBC AUTO-ENTMCNC: 30.9 G/DL (ref 33.6–35)
MCHC RBC AUTO-ENTMCNC: 31 G/DL (ref 33.6–35)
MCHC RBC AUTO-ENTMCNC: 31 G/DL (ref 33.6–35)
MCHC RBC AUTO-ENTMCNC: 31.1 G/DL (ref 33.6–35)
MCHC RBC AUTO-ENTMCNC: 31.1 G/DL (ref 33.6–35)
MCHC RBC AUTO-ENTMCNC: 31.2 G/DL (ref 33.6–35)
MCHC RBC AUTO-ENTMCNC: 31.2 G/DL (ref 33.6–35)
MCHC RBC AUTO-ENTMCNC: 31.3 G/DL (ref 33.6–35)
MCHC RBC AUTO-ENTMCNC: 31.3 G/DL (ref 33.6–35)
MCHC RBC AUTO-ENTMCNC: 31.4 G/DL (ref 33.6–35)
MCHC RBC AUTO-ENTMCNC: 31.6 G/DL (ref 33.6–35)
MCHC RBC AUTO-ENTMCNC: 31.7 G/DL (ref 33.6–35)
MCHC RBC AUTO-ENTMCNC: 31.8 G/DL (ref 33.6–35)
MCHC RBC AUTO-ENTMCNC: 31.8 G/DL (ref 33.6–35)
MCHC RBC AUTO-ENTMCNC: 31.9 G/DL (ref 33.6–35)
MCHC RBC AUTO-ENTMCNC: 32.1 G/DL (ref 33.6–35)
MCV RBC AUTO: 103.6 FL (ref 81.4–97.8)
MCV RBC AUTO: 104.7 FL (ref 81.4–97.8)
MCV RBC AUTO: 105.4 FL (ref 81.4–97.8)
MCV RBC AUTO: 105.5 FL (ref 81.4–97.8)
MCV RBC AUTO: 105.8 FL (ref 81.4–97.8)
MCV RBC AUTO: 105.8 FL (ref 81.4–97.8)
MCV RBC AUTO: 105.9 FL (ref 81.4–97.8)
MCV RBC AUTO: 106 FL (ref 81.4–97.8)
MCV RBC AUTO: 106.2 FL (ref 81.4–97.8)
MCV RBC AUTO: 106.4 FL (ref 81.4–97.8)
MCV RBC AUTO: 106.4 FL (ref 81.4–97.8)
MCV RBC AUTO: 106.6 FL (ref 81.4–97.8)
MCV RBC AUTO: 106.7 FL (ref 81.4–97.8)
MCV RBC AUTO: 107 FL (ref 81.4–97.8)
MCV RBC AUTO: 107.1 FL (ref 81.4–97.8)
MCV RBC AUTO: 107.1 FL (ref 81.4–97.8)
MCV RBC AUTO: 107.4 FL (ref 81.4–97.8)
MCV RBC AUTO: 108.6 FL (ref 81.4–97.8)
MCV RBC AUTO: 108.7 FL (ref 81.4–97.8)
MCV RBC AUTO: 108.7 FL (ref 81.4–97.8)
MCV RBC AUTO: 108.8 FL (ref 81.4–97.8)
MCV RBC AUTO: 109.6 FL (ref 81.4–97.8)
MESOTHL CELL NFR FLD: 4 %
MICRO URNS: ABNORMAL
MICRO URNS: ABNORMAL
MONOCYTES # BLD AUTO: 0.16 K/UL (ref 0–0.85)
MONOCYTES # BLD AUTO: 0.45 K/UL (ref 0–0.85)
MONOCYTES # BLD AUTO: 0.45 K/UL (ref 0–0.85)
MONOCYTES # BLD AUTO: 0.55 K/UL (ref 0–0.85)
MONOCYTES # BLD AUTO: 0.58 K/UL (ref 0–0.85)
MONOCYTES # BLD AUTO: 0.77 K/UL (ref 0–0.85)
MONOCYTES # BLD AUTO: 0.9 K/UL (ref 0–0.85)
MONOCYTES # BLD AUTO: 1.02 K/UL (ref 0–0.85)
MONOCYTES # BLD AUTO: 1.04 K/UL (ref 0–0.85)
MONOCYTES # BLD AUTO: 1.05 K/UL (ref 0–0.85)
MONOCYTES # BLD AUTO: 1.17 K/UL (ref 0–0.85)
MONOCYTES # BLD AUTO: 1.19 K/UL (ref 0–0.85)
MONOCYTES # BLD AUTO: 1.2 K/UL (ref 0–0.85)
MONOCYTES # BLD AUTO: 1.23 K/UL (ref 0–0.85)
MONOCYTES # BLD AUTO: 1.26 K/UL (ref 0–0.85)
MONOCYTES # BLD AUTO: 1.32 K/UL (ref 0–0.85)
MONOCYTES # BLD AUTO: 1.42 K/UL (ref 0–0.85)
MONOCYTES # BLD AUTO: 1.51 K/UL (ref 0–0.85)
MONOCYTES # BLD AUTO: 1.56 K/UL (ref 0–0.85)
MONOCYTES NFR BLD AUTO: 1.7 % (ref 0–13.4)
MONOCYTES NFR BLD AUTO: 10.6 % (ref 0–13.4)
MONOCYTES NFR BLD AUTO: 10.7 % (ref 0–13.4)
MONOCYTES NFR BLD AUTO: 11.1 % (ref 0–13.4)
MONOCYTES NFR BLD AUTO: 11.4 % (ref 0–13.4)
MONOCYTES NFR BLD AUTO: 15.7 % (ref 0–13.4)
MONOCYTES NFR BLD AUTO: 3.5 % (ref 0–13.4)
MONOCYTES NFR BLD AUTO: 5.3 % (ref 0–13.4)
MONOCYTES NFR BLD AUTO: 6 % (ref 0–13.4)
MONOCYTES NFR BLD AUTO: 7 % (ref 0–13.4)
MONOCYTES NFR BLD AUTO: 7 % (ref 0–13.4)
MONOCYTES NFR BLD AUTO: 8.6 % (ref 0–13.4)
MONOCYTES NFR BLD AUTO: 8.9 % (ref 0–13.4)
MONOCYTES NFR BLD AUTO: 9.2 % (ref 0–13.4)
MONOCYTES NFR BLD AUTO: 9.5 % (ref 0–13.4)
MONOCYTES NFR BLD AUTO: 9.5 % (ref 0–13.4)
MONOCYTES NFR BLD AUTO: 9.8 % (ref 0–13.4)
MONONUC CELLS NFR FLD: 4 %
MONONUC CELLS NFR FLD: 4 %
MORPHOLOGY BLD-IMP: NORMAL
NEUTROPHILS # BLD AUTO: 10.31 K/UL (ref 2–7.15)
NEUTROPHILS # BLD AUTO: 10.53 K/UL (ref 2–7.15)
NEUTROPHILS # BLD AUTO: 11.15 K/UL (ref 2–7.15)
NEUTROPHILS # BLD AUTO: 11.24 K/UL (ref 2–7.15)
NEUTROPHILS # BLD AUTO: 11.3 K/UL (ref 2–7.15)
NEUTROPHILS # BLD AUTO: 11.57 K/UL (ref 2–7.15)
NEUTROPHILS # BLD AUTO: 11.6 K/UL (ref 2–7.15)
NEUTROPHILS # BLD AUTO: 12.01 K/UL (ref 2–7.15)
NEUTROPHILS # BLD AUTO: 12.02 K/UL (ref 2–7.15)
NEUTROPHILS # BLD AUTO: 14.77 K/UL (ref 2–7.15)
NEUTROPHILS # BLD AUTO: 16.68 K/UL (ref 2–7.15)
NEUTROPHILS # BLD AUTO: 7.08 K/UL (ref 2–7.15)
NEUTROPHILS # BLD AUTO: 7.71 K/UL (ref 2–7.15)
NEUTROPHILS # BLD AUTO: 8.76 K/UL (ref 2–7.15)
NEUTROPHILS # BLD AUTO: 8.78 K/UL (ref 2–7.15)
NEUTROPHILS # BLD AUTO: 9.34 K/UL (ref 2–7.15)
NEUTROPHILS # BLD AUTO: 9.34 K/UL (ref 2–7.15)
NEUTROPHILS # BLD AUTO: 9.83 K/UL (ref 2–7.15)
NEUTROPHILS # BLD AUTO: 9.98 K/UL (ref 2–7.15)
NEUTROPHILS NFR BLD: 71.4 % (ref 44–72)
NEUTROPHILS NFR BLD: 75.2 % (ref 44–72)
NEUTROPHILS NFR BLD: 80.3 % (ref 44–72)
NEUTROPHILS NFR BLD: 81.2 % (ref 44–72)
NEUTROPHILS NFR BLD: 81.3 % (ref 44–72)
NEUTROPHILS NFR BLD: 81.9 % (ref 44–72)
NEUTROPHILS NFR BLD: 82.2 % (ref 44–72)
NEUTROPHILS NFR BLD: 82.4 % (ref 44–72)
NEUTROPHILS NFR BLD: 82.6 % (ref 44–72)
NEUTROPHILS NFR BLD: 82.9 % (ref 44–72)
NEUTROPHILS NFR BLD: 83.4 % (ref 44–72)
NEUTROPHILS NFR BLD: 85.2 % (ref 44–72)
NEUTROPHILS NFR BLD: 87.1 % (ref 44–72)
NEUTROPHILS NFR BLD: 88.6 % (ref 44–72)
NEUTROPHILS NFR BLD: 90.2 % (ref 44–72)
NEUTROPHILS NFR BLD: 90.5 % (ref 44–72)
NEUTROPHILS NFR BLD: 92.2 % (ref 44–72)
NEUTROPHILS NFR BLD: 93 % (ref 44–72)
NEUTROPHILS NFR BLD: 93.8 % (ref 44–72)
NEUTROPHILS NFR FLD: 59 %
NEUTROPHILS NFR FLD: 86 %
NEUTS BAND NFR BLD MANUAL: 0.9 % (ref 0–10)
NEUTS BAND NFR BLD MANUAL: 1.8 % (ref 0–10)
NEUTS BAND NFR BLD MANUAL: 2.6 % (ref 0–10)
NEUTS BAND NFR BLD MANUAL: 6.1 % (ref 0–10)
NITRITE UR QL STRIP.AUTO: NEGATIVE
NITRITE UR QL STRIP.AUTO: NEGATIVE
NRBC # BLD AUTO: 0 K/UL
NRBC # BLD AUTO: 0 K/UL
NRBC # BLD AUTO: 0.02 K/UL
NRBC # BLD AUTO: 0.03 K/UL
NRBC # BLD AUTO: 0.04 K/UL
NRBC # BLD AUTO: 0.05 K/UL
NRBC # BLD AUTO: 0.11 K/UL
NRBC BLD AUTO-RTO: 0 /100 WBC
NRBC BLD AUTO-RTO: 0 /100 WBC
NRBC BLD AUTO-RTO: 0.2 /100 WBC
NRBC BLD AUTO-RTO: 0.3 /100 WBC
NRBC BLD AUTO-RTO: 0.6 /100 WBC
NUCLEAR IGG SER QL IA: NORMAL
PA AA BLD-ACNC: 5.3 %
PA ADP BLD-ACNC: 51.9 %
PATHOLOGY STUDY: ABNORMAL
PATHOLOGY STUDY: ABNORMAL
PH UR STRIP.AUTO: 7 [PH]
PH UR STRIP.AUTO: 7.5 [PH]
PHOSPHATE SERPL-MCNC: 3.5 MG/DL (ref 2.5–4.5)
PHOSPHATE SERPL-MCNC: 4.6 MG/DL (ref 2.5–4.5)
PLATELET # BLD AUTO: 100 K/UL (ref 164–446)
PLATELET # BLD AUTO: 101 K/UL (ref 164–446)
PLATELET # BLD AUTO: 17 K/UL (ref 164–446)
PLATELET # BLD AUTO: 21 K/UL (ref 164–446)
PLATELET # BLD AUTO: 23 K/UL (ref 164–446)
PLATELET # BLD AUTO: 23 K/UL (ref 164–446)
PLATELET # BLD AUTO: 24 K/UL (ref 164–446)
PLATELET # BLD AUTO: 26 K/UL (ref 164–446)
PLATELET # BLD AUTO: 26 K/UL (ref 164–446)
PLATELET # BLD AUTO: 30 K/UL (ref 164–446)
PLATELET # BLD AUTO: 30 K/UL (ref 164–446)
PLATELET # BLD AUTO: 39 K/UL (ref 164–446)
PLATELET # BLD AUTO: 52 K/UL (ref 164–446)
PLATELET # BLD AUTO: 64 K/UL (ref 164–446)
PLATELET # BLD AUTO: 76 K/UL (ref 164–446)
PLATELET # BLD AUTO: 83 K/UL (ref 164–446)
PLATELET # BLD AUTO: 89 K/UL (ref 164–446)
PLATELET # BLD AUTO: 91 K/UL (ref 164–446)
PLATELET # BLD AUTO: 92 K/UL (ref 164–446)
PLATELET # BLD AUTO: 93 K/UL (ref 164–446)
PLATELET # BLD AUTO: 95 K/UL (ref 164–446)
PLATELET # BLD AUTO: 99 K/UL (ref 164–446)
PLATELET BLD QL SMEAR: NORMAL
PLATELETS.RETICULATED NFR BLD AUTO: 16.1 K/UL (ref 0.6–13.1)
PLATELETS.RETICULATED NFR BLD AUTO: 16.8 K/UL (ref 0.6–13.1)
PLATELETS.RETICULATED NFR BLD AUTO: 17.6 K/UL (ref 0.6–13.1)
PLATELETS.RETICULATED NFR BLD AUTO: 20 K/UL (ref 0.6–13.1)
PLATELETS.RETICULATED NFR BLD AUTO: 20.2 K/UL (ref 0.6–13.1)
PLATELETS.RETICULATED NFR BLD AUTO: 21.1 K/UL (ref 0.6–13.1)
PLATELETS.RETICULATED NFR BLD AUTO: 21.9 K/UL (ref 0.6–13.1)
PMV BLD AUTO: 10.6 FL (ref 9–12.9)
PMV BLD AUTO: 10.9 FL (ref 9–12.9)
PMV BLD AUTO: 11 FL (ref 9–12.9)
PMV BLD AUTO: 11.1 FL (ref 9–12.9)
PMV BLD AUTO: 11.1 FL (ref 9–12.9)
PMV BLD AUTO: 11.2 FL (ref 9–12.9)
PMV BLD AUTO: 11.3 FL (ref 9–12.9)
PMV BLD AUTO: 11.3 FL (ref 9–12.9)
PMV BLD AUTO: 11.4 FL (ref 9–12.9)
PMV BLD AUTO: 11.5 FL (ref 9–12.9)
PMV BLD AUTO: 11.6 FL (ref 9–12.9)
PMV BLD AUTO: 11.6 FL (ref 9–12.9)
PMV BLD AUTO: 11.9 FL (ref 9–12.9)
PMV BLD AUTO: 12.1 FL (ref 9–12.9)
PMV BLD AUTO: 12.8 FL (ref 9–12.9)
PMV BLD AUTO: 12.9 FL (ref 9–12.9)
PMV BLD AUTO: 12.9 FL (ref 9–12.9)
PMV BLD AUTO: 13 FL (ref 9–12.9)
PMV BLD AUTO: 13 FL (ref 9–12.9)
PMV BLD AUTO: 13.1 FL (ref 9–12.9)
POIKILOCYTOSIS BLD QL SMEAR: NORMAL
POLYCHROMASIA BLD QL SMEAR: NORMAL
POTASSIUM SERPL-SCNC: 3.3 MMOL/L (ref 3.6–5.5)
POTASSIUM SERPL-SCNC: 3.4 MMOL/L (ref 3.6–5.5)
POTASSIUM SERPL-SCNC: 3.5 MMOL/L (ref 3.6–5.5)
POTASSIUM SERPL-SCNC: 3.6 MMOL/L (ref 3.6–5.5)
POTASSIUM SERPL-SCNC: 3.6 MMOL/L (ref 3.6–5.5)
POTASSIUM SERPL-SCNC: 3.7 MMOL/L (ref 3.6–5.5)
POTASSIUM SERPL-SCNC: 3.7 MMOL/L (ref 3.6–5.5)
POTASSIUM SERPL-SCNC: 3.8 MMOL/L (ref 3.6–5.5)
POTASSIUM SERPL-SCNC: 3.8 MMOL/L (ref 3.6–5.5)
POTASSIUM SERPL-SCNC: 3.9 MMOL/L (ref 3.6–5.5)
POTASSIUM SERPL-SCNC: 4 MMOL/L (ref 3.6–5.5)
POTASSIUM SERPL-SCNC: 4 MMOL/L (ref 3.6–5.5)
POTASSIUM SERPL-SCNC: 4.1 MMOL/L (ref 3.6–5.5)
POTASSIUM SERPL-SCNC: 4.2 MMOL/L (ref 3.6–5.5)
POTASSIUM SERPL-SCNC: 4.3 MMOL/L (ref 3.6–5.5)
POTASSIUM SERPL-SCNC: 4.5 MMOL/L (ref 3.6–5.5)
POTASSIUM UR-SCNC: 46.1 MMOL/L
POTASSIUM UR-SCNC: 79.7 MMOL/L
PROCALCITONIN SERPL-MCNC: 0.45 NG/ML
PROT FLD-MCNC: <3 G/DL
PROT SERPL-MCNC: 6.4 G/DL (ref 6–8.2)
PROT SERPL-MCNC: 6.4 G/DL (ref 6–8.2)
PROT SERPL-MCNC: 6.5 G/DL (ref 6–8.2)
PROT SERPL-MCNC: 6.5 G/DL (ref 6–8.2)
PROT SERPL-MCNC: 6.6 G/DL (ref 6–8.2)
PROT SERPL-MCNC: 6.6 G/DL (ref 6–8.2)
PROT SERPL-MCNC: 6.8 G/DL (ref 6–8.2)
PROT SERPL-MCNC: 6.8 G/DL (ref 6–8.2)
PROT SERPL-MCNC: 7.1 G/DL (ref 6–8.2)
PROT SERPL-MCNC: 7.2 G/DL (ref 6–8.2)
PROT SERPL-MCNC: 7.2 G/DL (ref 6–8.2)
PROT SERPL-MCNC: 7.3 G/DL (ref 6–8.2)
PROT SERPL-MCNC: 7.3 G/DL (ref 6–8.2)
PROT SERPL-MCNC: 7.4 G/DL (ref 6–8.2)
PROT SERPL-MCNC: 7.4 G/DL (ref 6–8.2)
PROT SERPL-MCNC: 7.6 G/DL (ref 6–8.2)
PROT SERPL-MCNC: 7.9 G/DL (ref 6–8.2)
PROT SERPL-MCNC: 8.6 G/DL (ref 6–8.2)
PROT UR QL STRIP: 50 MG/DL
PROT UR QL STRIP: >=300 MG/DL
PROT UR-MCNC: 977.9 MG/DL (ref 0–15)
PROTHROMBIN TIME: 23.7 SEC (ref 12–14.6)
PROTHROMBIN TIME: 23.8 SEC (ref 12–14.6)
PROTHROMBIN TIME: 24.5 SEC (ref 12–14.6)
PROTHROMBIN TIME: 24.7 SEC (ref 12–14.6)
PROTHROMBIN TIME: 24.8 SEC (ref 12–14.6)
PROTHROMBIN TIME: 25.9 SEC (ref 12–14.6)
PROTHROMBIN TIME: 26.2 SEC (ref 12–14.6)
RBC # BLD AUTO: 2.3 M/UL (ref 4.2–5.4)
RBC # BLD AUTO: 2.31 M/UL (ref 4.2–5.4)
RBC # BLD AUTO: 2.39 M/UL (ref 4.2–5.4)
RBC # BLD AUTO: 2.42 M/UL (ref 4.2–5.4)
RBC # BLD AUTO: 2.43 M/UL (ref 4.2–5.4)
RBC # BLD AUTO: 2.54 M/UL (ref 4.2–5.4)
RBC # BLD AUTO: 2.58 M/UL (ref 4.2–5.4)
RBC # BLD AUTO: 2.66 M/UL (ref 4.2–5.4)
RBC # BLD AUTO: 2.67 M/UL (ref 4.2–5.4)
RBC # BLD AUTO: 2.7 M/UL (ref 4.2–5.4)
RBC # BLD AUTO: 2.72 M/UL (ref 4.2–5.4)
RBC # BLD AUTO: 2.75 M/UL (ref 4.2–5.4)
RBC # BLD AUTO: 2.77 M/UL (ref 4.2–5.4)
RBC # BLD AUTO: 2.77 M/UL (ref 4.2–5.4)
RBC # BLD AUTO: 2.78 M/UL (ref 4.2–5.4)
RBC # BLD AUTO: 2.93 M/UL (ref 4.2–5.4)
RBC # BLD AUTO: 3.13 M/UL (ref 4.2–5.4)
RBC # BLD AUTO: 3.26 M/UL (ref 4.2–5.4)
RBC # BLD AUTO: 3.28 M/UL (ref 4.2–5.4)
RBC # BLD AUTO: 3.43 M/UL (ref 4.2–5.4)
RBC # FLD: <2000 CELLS/UL
RBC # FLD: <2000 CELLS/UL
RBC # URNS HPF: >150 /HPF
RBC # URNS HPF: ABNORMAL /HPF
RBC BLD AUTO: PRESENT
RBC UR QL AUTO: ABNORMAL
RBC UR QL AUTO: ABNORMAL
SIGNIFICANT IND 70042: ABNORMAL
SIGNIFICANT IND 70042: ABNORMAL
SIGNIFICANT IND 70042: NORMAL
SITE SITE: ABNORMAL
SITE SITE: ABNORMAL
SITE SITE: NORMAL
SMA IGG SER-ACNC: 30 UNITS (ref 0–19)
SMOOTH MUSCLE IGG TITR SER: ABNORMAL {TITER}
SMUDGE CELLS BLD QL SMEAR: NORMAL
SODIUM SERPL-SCNC: 130 MMOL/L (ref 135–145)
SODIUM SERPL-SCNC: 131 MMOL/L (ref 135–145)
SODIUM SERPL-SCNC: 131 MMOL/L (ref 135–145)
SODIUM SERPL-SCNC: 132 MMOL/L (ref 135–145)
SODIUM SERPL-SCNC: 133 MMOL/L (ref 135–145)
SODIUM SERPL-SCNC: 133 MMOL/L (ref 135–145)
SODIUM SERPL-SCNC: 134 MMOL/L (ref 135–145)
SODIUM SERPL-SCNC: 135 MMOL/L (ref 135–145)
SODIUM SERPL-SCNC: 136 MMOL/L (ref 135–145)
SODIUM SERPL-SCNC: 136 MMOL/L (ref 135–145)
SODIUM SERPL-SCNC: 137 MMOL/L (ref 135–145)
SODIUM SERPL-SCNC: 138 MMOL/L (ref 135–145)
SODIUM SERPL-SCNC: 139 MMOL/L (ref 135–145)
SODIUM SERPL-SCNC: 140 MMOL/L (ref 135–145)
SODIUM SERPL-SCNC: 140 MMOL/L (ref 135–145)
SODIUM SERPL-SCNC: 141 MMOL/L (ref 135–145)
SODIUM SERPL-SCNC: 141 MMOL/L (ref 135–145)
SODIUM SERPL-SCNC: 143 MMOL/L (ref 135–145)
SODIUM UR-SCNC: 11 MMOL/L
SODIUM UR-SCNC: 18 MMOL/L
SOURCE SOURCE: ABNORMAL
SOURCE SOURCE: ABNORMAL
SOURCE SOURCE: NORMAL
SP GR UR STRIP.AUTO: 1.01
SP GR UR STRIP.AUTO: 1.02
SRA 0.1IU/ML UFH SER-ACNC: 0 %
SRA 100IU/ML UFH SER-ACNC: 0 %
SRA PORCINE INTERP SER-IMP: NEGATIVE
SRA UFH SER-IMP: NORMAL
TARGETS BLD QL SMEAR: NORMAL
TEG ALGORITHM TGALG: ABNORMAL
TEG ALGORITHM TGALG: ABNORMAL
TEG ALGORITHM TGALG: NORMAL
TIBC SERPL-MCNC: 154 UG/DL (ref 250–450)
TOXIC GRANULES BLD QL SMEAR: SLIGHT
TROPONIN I SERPL-MCNC: 0.07 NG/ML (ref 0–0.04)
TROPONIN I SERPL-MCNC: 0.07 NG/ML (ref 0–0.04)
TSH SERPL DL<=0.005 MIU/L-ACNC: 3.63 UIU/ML (ref 0.3–3.7)
VANCOMYCIN SERPL-MCNC: 10.8 UG/ML
VANCOMYCIN SERPL-MCNC: 11.5 UG/ML
VANCOMYCIN SERPL-MCNC: 11.7 UG/ML
VANCOMYCIN SERPL-MCNC: 11.8 UG/ML
VANCOMYCIN SERPL-MCNC: 15 UG/ML
VANCOMYCIN SERPL-MCNC: 20.3 UG/ML
VIT B12 SERPL-MCNC: >1500 PG/ML (ref 211–911)
VIT B12 SERPL-MCNC: >1500 PG/ML (ref 211–911)
WBC # BLD AUTO: 10.7 K/UL (ref 4.8–10.8)
WBC # BLD AUTO: 10.9 K/UL (ref 4.8–10.8)
WBC # BLD AUTO: 11.5 K/UL (ref 4.8–10.8)
WBC # BLD AUTO: 12 K/UL (ref 4.8–10.8)
WBC # BLD AUTO: 12.4 K/UL (ref 4.8–10.8)
WBC # BLD AUTO: 12.5 K/UL (ref 4.8–10.8)
WBC # BLD AUTO: 12.8 K/UL (ref 4.8–10.8)
WBC # BLD AUTO: 12.9 K/UL (ref 4.8–10.8)
WBC # BLD AUTO: 13.3 K/UL (ref 4.8–10.8)
WBC # BLD AUTO: 13.5 K/UL (ref 4.8–10.8)
WBC # BLD AUTO: 13.8 K/UL (ref 4.8–10.8)
WBC # BLD AUTO: 14.3 K/UL (ref 4.8–10.8)
WBC # BLD AUTO: 15.6 K/UL (ref 4.8–10.8)
WBC # BLD AUTO: 16 K/UL (ref 4.8–10.8)
WBC # BLD AUTO: 18.8 K/UL (ref 4.8–10.8)
WBC # BLD AUTO: 8.4 K/UL (ref 4.8–10.8)
WBC # BLD AUTO: 9.2 K/UL (ref 4.8–10.8)
WBC # BLD AUTO: 9.7 K/UL (ref 4.8–10.8)
WBC # BLD AUTO: 9.9 K/UL (ref 4.8–10.8)
WBC # FLD: 29 CELLS/UL
WBC # FLD: 508 CELLS/UL
WBC #/AREA URNS HPF: ABNORMAL /HPF
WBC #/AREA URNS HPF: ABNORMAL /HPF

## 2017-01-01 PROCEDURE — 85007 BL SMEAR W/DIFF WBC COUNT: CPT

## 2017-01-01 PROCEDURE — 99232 SBSQ HOSP IP/OBS MODERATE 35: CPT | Performed by: INTERNAL MEDICINE

## 2017-01-01 PROCEDURE — A9270 NON-COVERED ITEM OR SERVICE: HCPCS | Performed by: INTERNAL MEDICINE

## 2017-01-01 PROCEDURE — 84133 ASSAY OF URINE POTASSIUM: CPT

## 2017-01-01 PROCEDURE — S9126 HOSPICE CARE, IN THE HOME, P: HCPCS

## 2017-01-01 PROCEDURE — 89051 BODY FLUID CELL COUNT: CPT

## 2017-01-01 PROCEDURE — 302146: Performed by: INTERNAL MEDICINE

## 2017-01-01 PROCEDURE — 700102 HCHG RX REV CODE 250 W/ 637 OVERRIDE(OP): Performed by: HOSPITALIST

## 2017-01-01 PROCEDURE — 700111 HCHG RX REV CODE 636 W/ 250 OVERRIDE (IP): Performed by: HOSPITALIST

## 2017-01-01 PROCEDURE — 700102 HCHG RX REV CODE 250 W/ 637 OVERRIDE(OP): Performed by: INTERNAL MEDICINE

## 2017-01-01 PROCEDURE — 85027 COMPLETE CBC AUTOMATED: CPT

## 2017-01-01 PROCEDURE — 87040 BLOOD CULTURE FOR BACTERIA: CPT | Mod: 91

## 2017-01-01 PROCEDURE — 700111 HCHG RX REV CODE 636 W/ 250 OVERRIDE (IP): Performed by: INTERNAL MEDICINE

## 2017-01-01 PROCEDURE — 80053 COMPREHEN METABOLIC PANEL: CPT

## 2017-01-01 PROCEDURE — 70450 CT HEAD/BRAIN W/O DYE: CPT

## 2017-01-01 PROCEDURE — 82550 ASSAY OF CK (CPK): CPT

## 2017-01-01 PROCEDURE — 700105 HCHG RX REV CODE 258: Performed by: INTERNAL MEDICINE

## 2017-01-01 PROCEDURE — 99233 SBSQ HOSP IP/OBS HIGH 50: CPT | Performed by: INTERNAL MEDICINE

## 2017-01-01 PROCEDURE — 770020 HCHG ROOM/CARE - TELE (206)

## 2017-01-01 PROCEDURE — 700111 HCHG RX REV CODE 636 W/ 250 OVERRIDE (IP)

## 2017-01-01 PROCEDURE — 36415 COLL VENOUS BLD VENIPUNCTURE: CPT

## 2017-01-01 PROCEDURE — 85025 COMPLETE CBC W/AUTO DIFF WBC: CPT

## 2017-01-01 PROCEDURE — 700101 HCHG RX REV CODE 250: Performed by: INTERNAL MEDICINE

## 2017-01-01 PROCEDURE — 87015 SPECIMEN INFECT AGNT CONCNTJ: CPT

## 2017-01-01 PROCEDURE — 87070 CULTURE OTHR SPECIMN AEROBIC: CPT

## 2017-01-01 PROCEDURE — 665036 HSPC NOTICE OF ELECTION NOE: Performed by: INTERNAL MEDICINE

## 2017-01-01 PROCEDURE — 303105 HCHG CATHETER EXTRA

## 2017-01-01 PROCEDURE — 700105 HCHG RX REV CODE 258: Performed by: STUDENT IN AN ORGANIZED HEALTH CARE EDUCATION/TRAINING PROGRAM

## 2017-01-01 PROCEDURE — 82570 ASSAY OF URINE CREATININE: CPT

## 2017-01-01 PROCEDURE — 302255 BARRIER CREAM MOISTURE BAZA PROTECT (ZINC) 5OZ: Performed by: INTERNAL MEDICINE

## 2017-01-01 PROCEDURE — 82150 ASSAY OF AMYLASE: CPT

## 2017-01-01 PROCEDURE — 0W9G3ZX DRAINAGE OF PERITONEAL CAVITY, PERCUTANEOUS APPROACH, DIAGNOSTIC: ICD-10-PCS | Performed by: RADIOLOGY

## 2017-01-01 PROCEDURE — 76705 ECHO EXAM OF ABDOMEN: CPT

## 2017-01-01 PROCEDURE — 302307 BAG SINGLE USE 2000ML COLLECT: Performed by: INTERNAL MEDICINE

## 2017-01-01 PROCEDURE — 82436 ASSAY OF URINE CHLORIDE: CPT

## 2017-01-01 PROCEDURE — 90935 HEMODIALYSIS ONE EVALUATION: CPT

## 2017-01-01 PROCEDURE — 87522 HEPATITIS C REVRS TRNSCRPJ: CPT

## 2017-01-01 PROCEDURE — G0299 HHS/HOSPICE OF RN EA 15 MIN: HCPCS

## 2017-01-01 PROCEDURE — G8991 OTHER PT/OT GOAL STATUS: HCPCS | Mod: CH

## 2017-01-01 PROCEDURE — 86235 NUCLEAR ANTIGEN ANTIBODY: CPT | Mod: 91

## 2017-01-01 PROCEDURE — 51798 US URINE CAPACITY MEASURE: CPT

## 2017-01-01 PROCEDURE — A6214 FOAM DRG > 48 SQ IN W/BORDER: HCPCS

## 2017-01-01 PROCEDURE — 84156 ASSAY OF PROTEIN URINE: CPT

## 2017-01-01 PROCEDURE — 83690 ASSAY OF LIPASE: CPT

## 2017-01-01 PROCEDURE — 80202 ASSAY OF VANCOMYCIN: CPT

## 2017-01-01 PROCEDURE — 90670 PCV13 VACCINE IM: CPT | Performed by: INTERNAL MEDICINE

## 2017-01-01 PROCEDURE — 770001 HCHG ROOM/CARE - MED/SURG/GYN PRIV*

## 2017-01-01 PROCEDURE — 700105 HCHG RX REV CODE 258: Performed by: HOSPITALIST

## 2017-01-01 PROCEDURE — 99232 SBSQ HOSP IP/OBS MODERATE 35: CPT | Performed by: HOSPITALIST

## 2017-01-01 PROCEDURE — 83735 ASSAY OF MAGNESIUM: CPT

## 2017-01-01 PROCEDURE — 92610 EVALUATE SWALLOWING FUNCTION: CPT

## 2017-01-01 PROCEDURE — 302255 BARRIER CREAM MOISTURE BAZA PROTECT: Performed by: HOSPITALIST

## 2017-01-01 PROCEDURE — 92526 ORAL FUNCTION THERAPY: CPT

## 2017-01-01 PROCEDURE — 82140 ASSAY OF AMMONIA: CPT

## 2017-01-01 PROCEDURE — 770006 HCHG ROOM/CARE - MED/SURG/GYN SEMI*

## 2017-01-01 PROCEDURE — 85347 COAGULATION TIME ACTIVATED: CPT

## 2017-01-01 PROCEDURE — 74000 DX-ABDOMEN-1 VIEW: CPT

## 2017-01-01 PROCEDURE — 5A1D60Z PERFORMANCE OF URINARY FILTRATION, MULTIPLE: ICD-10-PCS | Performed by: RADIOLOGY

## 2017-01-01 PROCEDURE — 82248 BILIRUBIN DIRECT: CPT

## 2017-01-01 PROCEDURE — 302112 WASHCLOTH,PERINEAL CARE: Performed by: INTERNAL MEDICINE

## 2017-01-01 PROCEDURE — 96367 TX/PROPH/DG ADDL SEQ IV INF: CPT

## 2017-01-01 PROCEDURE — 700102 HCHG RX REV CODE 250 W/ 637 OVERRIDE(OP): Performed by: NURSE PRACTITIONER

## 2017-01-01 PROCEDURE — 0W9G3ZX DRAINAGE OF PERITONEAL CAVITY, PERCUTANEOUS APPROACH, DIAGNOSTIC: ICD-10-PCS | Performed by: INTERNAL MEDICINE

## 2017-01-01 PROCEDURE — 81001 URINALYSIS AUTO W/SCOPE: CPT

## 2017-01-01 PROCEDURE — 700111 HCHG RX REV CODE 636 W/ 250 OVERRIDE (IP): Performed by: PHARMACIST

## 2017-01-01 PROCEDURE — 700105 HCHG RX REV CODE 258

## 2017-01-01 PROCEDURE — 83540 ASSAY OF IRON: CPT

## 2017-01-01 PROCEDURE — 700101 HCHG RX REV CODE 250: Performed by: HOSPITALIST

## 2017-01-01 PROCEDURE — 85027 COMPLETE CBC AUTOMATED: CPT | Mod: 91

## 2017-01-01 PROCEDURE — 85055 RETICULATED PLATELET ASSAY: CPT

## 2017-01-01 PROCEDURE — 85610 PROTHROMBIN TIME: CPT

## 2017-01-01 PROCEDURE — 97162 PT EVAL MOD COMPLEX 30 MIN: CPT

## 2017-01-01 PROCEDURE — 85576 BLOOD PLATELET AGGREGATION: CPT

## 2017-01-01 PROCEDURE — 97535 SELF CARE MNGMENT TRAINING: CPT

## 2017-01-01 PROCEDURE — 82272 OCCULT BLD FECES 1-3 TESTS: CPT

## 2017-01-01 PROCEDURE — A9270 NON-COVERED ITEM OR SERVICE: HCPCS | Performed by: STUDENT IN AN ORGANIZED HEALTH CARE EDUCATION/TRAINING PROGRAM

## 2017-01-01 PROCEDURE — 97167 OT EVAL HIGH COMPLEX 60 MIN: CPT

## 2017-01-01 PROCEDURE — 93005 ELECTROCARDIOGRAM TRACING: CPT | Performed by: EMERGENCY MEDICINE

## 2017-01-01 PROCEDURE — 94760 N-INVAS EAR/PLS OXIMETRY 1: CPT

## 2017-01-01 PROCEDURE — C1894 INTRO/SHEATH, NON-LASER: HCPCS

## 2017-01-01 PROCEDURE — 83550 IRON BINDING TEST: CPT

## 2017-01-01 PROCEDURE — P9047 ALBUMIN (HUMAN), 25%, 50ML: HCPCS

## 2017-01-01 PROCEDURE — 77001 FLUOROGUIDE FOR VEIN DEVICE: CPT

## 2017-01-01 PROCEDURE — 86225 DNA ANTIBODY NATIVE: CPT

## 2017-01-01 PROCEDURE — 80074 ACUTE HEPATITIS PANEL: CPT

## 2017-01-01 PROCEDURE — 84300 ASSAY OF URINE SODIUM: CPT

## 2017-01-01 PROCEDURE — 82945 GLUCOSE OTHER FLUID: CPT

## 2017-01-01 PROCEDURE — 80048 BASIC METABOLIC PNL TOTAL CA: CPT

## 2017-01-01 PROCEDURE — 99239 HOSP IP/OBS DSCHRG MGMT >30: CPT | Performed by: INTERNAL MEDICINE

## 2017-01-01 PROCEDURE — 85384 FIBRINOGEN ACTIVITY: CPT

## 2017-01-01 PROCEDURE — A9270 NON-COVERED ITEM OR SERVICE: HCPCS | Performed by: NURSE PRACTITIONER

## 2017-01-01 PROCEDURE — G8988 SELF CARE GOAL STATUS: HCPCS | Mod: CL

## 2017-01-01 PROCEDURE — 80069 RENAL FUNCTION PANEL: CPT

## 2017-01-01 PROCEDURE — G8997 SWALLOW GOAL STATUS: HCPCS | Mod: CH

## 2017-01-01 PROCEDURE — 84157 ASSAY OF PROTEIN OTHER: CPT

## 2017-01-01 PROCEDURE — 83880 ASSAY OF NATRIURETIC PEPTIDE: CPT

## 2017-01-01 PROCEDURE — 83605 ASSAY OF LACTIC ACID: CPT

## 2017-01-01 PROCEDURE — 700111 HCHG RX REV CODE 636 W/ 250 OVERRIDE (IP): Performed by: EMERGENCY MEDICINE

## 2017-01-01 PROCEDURE — 83605 ASSAY OF LACTIC ACID: CPT | Mod: 91

## 2017-01-01 PROCEDURE — 86706 HEP B SURFACE ANTIBODY: CPT

## 2017-01-01 PROCEDURE — 51702 INSERT TEMP BLADDER CATH: CPT

## 2017-01-01 PROCEDURE — 87205 SMEAR GRAM STAIN: CPT

## 2017-01-01 PROCEDURE — 86022 PLATELET ANTIBODIES: CPT | Mod: 91

## 2017-01-01 PROCEDURE — 86256 FLUORESCENT ANTIBODY TITER: CPT

## 2017-01-01 PROCEDURE — 02HV33Z INSERTION OF INFUSION DEVICE INTO SUPERIOR VENA CAVA, PERCUTANEOUS APPROACH: ICD-10-PCS | Performed by: RADIOLOGY

## 2017-01-01 PROCEDURE — 99356 PR PROLONGED SVC I/P OR OBS SETTING 1ST HOUR: CPT | Performed by: HOSPITALIST

## 2017-01-01 PROCEDURE — 87086 URINE CULTURE/COLONY COUNT: CPT

## 2017-01-01 PROCEDURE — 87493 C DIFF AMPLIFIED PROBE: CPT

## 2017-01-01 PROCEDURE — 74181 MRI ABDOMEN W/O CONTRAST: CPT

## 2017-01-01 PROCEDURE — A9270 NON-COVERED ITEM OR SERVICE: HCPCS | Performed by: HOSPITALIST

## 2017-01-01 PROCEDURE — G8990 OTHER PT/OT CURRENT STATUS: HCPCS | Mod: CH

## 2017-01-01 PROCEDURE — 700105 HCHG RX REV CODE 258: Performed by: PHARMACIST

## 2017-01-01 PROCEDURE — 700111 HCHG RX REV CODE 636 W/ 250 OVERRIDE (IP): Performed by: STUDENT IN AN ORGANIZED HEALTH CARE EDUCATION/TRAINING PROGRAM

## 2017-01-01 PROCEDURE — 307738 PARACENTESIS ABDOMINAL KIT: Performed by: INTERNAL MEDICINE

## 2017-01-01 PROCEDURE — 302112 WASHCLOTH,PERINEAL CARE: Performed by: STUDENT IN AN ORGANIZED HEALTH CARE EDUCATION/TRAINING PROGRAM

## 2017-01-01 PROCEDURE — 89190 NASAL SMEAR FOR EOSINOPHILS: CPT

## 2017-01-01 PROCEDURE — G0156 HHCP-SVS OF AIDE,EA 15 MIN: HCPCS

## 2017-01-01 PROCEDURE — 700117 HCHG RX CONTRAST REV CODE 255: Performed by: SURGERY

## 2017-01-01 PROCEDURE — 82105 ALPHA-FETOPROTEIN SERUM: CPT

## 2017-01-01 PROCEDURE — 93306 TTE W/DOPPLER COMPLETE: CPT | Mod: 26 | Performed by: INTERNAL MEDICINE

## 2017-01-01 PROCEDURE — 82607 VITAMIN B-12: CPT

## 2017-01-01 PROCEDURE — C9113 INJ PANTOPRAZOLE SODIUM, VIA: HCPCS | Performed by: INTERNAL MEDICINE

## 2017-01-01 PROCEDURE — G8996 SWALLOW CURRENT STATUS: HCPCS | Mod: CJ

## 2017-01-01 PROCEDURE — A6219 GAUZE <= 16 SQ IN W/BORDER: HCPCS

## 2017-01-01 PROCEDURE — 93306 TTE W/DOPPLER COMPLETE: CPT

## 2017-01-01 PROCEDURE — 87186 SC STD MICRODIL/AGAR DIL: CPT

## 2017-01-01 PROCEDURE — A4927 NON-STERILE GLOVES: HCPCS

## 2017-01-01 PROCEDURE — 74177 CT ABD & PELVIS W/CONTRAST: CPT

## 2017-01-01 PROCEDURE — 85025 COMPLETE CBC W/AUTO DIFF WBC: CPT | Mod: 91

## 2017-01-01 PROCEDURE — 49083 ABD PARACENTESIS W/IMAGING: CPT

## 2017-01-01 PROCEDURE — G8978 MOBILITY CURRENT STATUS: HCPCS | Mod: CL

## 2017-01-01 PROCEDURE — P9045 ALBUMIN (HUMAN), 5%, 250 ML: HCPCS | Performed by: INTERNAL MEDICINE

## 2017-01-01 PROCEDURE — A6250 SKIN SEAL PROTECT MOISTURIZR: HCPCS | Performed by: INTERNAL MEDICINE

## 2017-01-01 PROCEDURE — P9047 ALBUMIN (HUMAN), 25%, 50ML: HCPCS | Performed by: INTERNAL MEDICINE

## 2017-01-01 PROCEDURE — 36556 INSERT NON-TUNNEL CV CATH: CPT

## 2017-01-01 PROCEDURE — 90471 IMMUNIZATION ADMIN: CPT

## 2017-01-01 PROCEDURE — 3E0234Z INTRODUCTION OF SERUM, TOXOID AND VACCINE INTO MUSCLE, PERCUTANEOUS APPROACH: ICD-10-PCS | Performed by: HOSPITALIST

## 2017-01-01 PROCEDURE — 87324 CLOSTRIDIUM AG IA: CPT

## 2017-01-01 PROCEDURE — 97110 THERAPEUTIC EXERCISES: CPT

## 2017-01-01 PROCEDURE — 82728 ASSAY OF FERRITIN: CPT

## 2017-01-01 PROCEDURE — 99223 1ST HOSP IP/OBS HIGH 75: CPT | Performed by: HOSPITALIST

## 2017-01-01 PROCEDURE — 86022 PLATELET ANTIBODIES: CPT

## 2017-01-01 PROCEDURE — 306802 CATHETER,INSTAFLOW BOWEL: Performed by: STUDENT IN AN ORGANIZED HEALTH CARE EDUCATION/TRAINING PROGRAM

## 2017-01-01 PROCEDURE — 700105 HCHG RX REV CODE 258: Performed by: EMERGENCY MEDICINE

## 2017-01-01 PROCEDURE — 700102 HCHG RX REV CODE 250 W/ 637 OVERRIDE(OP): Performed by: STUDENT IN AN ORGANIZED HEALTH CARE EDUCATION/TRAINING PROGRAM

## 2017-01-01 PROCEDURE — 306588 SLEEVE,VASO CALF MED: Performed by: HOSPITALIST

## 2017-01-01 PROCEDURE — 84100 ASSAY OF PHOSPHORUS: CPT

## 2017-01-01 PROCEDURE — 76937 US GUIDE VASCULAR ACCESS: CPT

## 2017-01-01 PROCEDURE — 86038 ANTINUCLEAR ANTIBODIES: CPT

## 2017-01-01 PROCEDURE — 76775 US EXAM ABDO BACK WALL LIM: CPT

## 2017-01-01 PROCEDURE — 82962 GLUCOSE BLOOD TEST: CPT

## 2017-01-01 PROCEDURE — A5120 SKIN BARRIER, WIPE OR SWAB: HCPCS

## 2017-01-01 PROCEDURE — 84484 ASSAY OF TROPONIN QUANT: CPT

## 2017-01-01 PROCEDURE — G8979 MOBILITY GOAL STATUS: HCPCS | Mod: CJ

## 2017-01-01 PROCEDURE — 96365 THER/PROPH/DIAG IV INF INIT: CPT

## 2017-01-01 PROCEDURE — 84145 PROCALCITONIN (PCT): CPT

## 2017-01-01 PROCEDURE — 302255 BARRIER CREAM MOISTURE BAZA PROTECT: Performed by: INTERNAL MEDICINE

## 2017-01-01 PROCEDURE — 71010 DX-CHEST-PORTABLE (1 VIEW): CPT

## 2017-01-01 PROCEDURE — 99233 SBSQ HOSP IP/OBS HIGH 50: CPT | Performed by: HOSPITALIST

## 2017-01-01 PROCEDURE — 97161 PT EVAL LOW COMPLEX 20 MIN: CPT

## 2017-01-01 PROCEDURE — 82746 ASSAY OF FOLIC ACID SERUM: CPT

## 2017-01-01 PROCEDURE — 84443 ASSAY THYROID STIM HORMONE: CPT

## 2017-01-01 PROCEDURE — 83516 IMMUNOASSAY NONANTIBODY: CPT

## 2017-01-01 PROCEDURE — 87077 CULTURE AEROBIC IDENTIFY: CPT

## 2017-01-01 PROCEDURE — 97530 THERAPEUTIC ACTIVITIES: CPT

## 2017-01-01 PROCEDURE — 96366 THER/PROPH/DIAG IV INF ADDON: CPT

## 2017-01-01 PROCEDURE — G8987 SELF CARE CURRENT STATUS: HCPCS | Mod: CM

## 2017-01-01 PROCEDURE — 88112 CYTOPATH CELL ENHANCE TECH: CPT

## 2017-01-01 PROCEDURE — 99285 EMERGENCY DEPT VISIT HI MDM: CPT

## 2017-01-01 RX ORDER — ALBUMIN (HUMAN) 12.5 G/50ML
12.5 SOLUTION INTRAVENOUS
Status: DISCONTINUED | OUTPATIENT
Start: 2017-01-01 | End: 2017-01-01 | Stop reason: HOSPADM

## 2017-01-01 RX ORDER — SODIUM CHLORIDE 9 MG/ML
1000 INJECTION, SOLUTION INTRAVENOUS ONCE
Status: DISCONTINUED | OUTPATIENT
Start: 2017-01-01 | End: 2017-01-01

## 2017-01-01 RX ORDER — PHYTONADIONE 10 MG/ML
5 INJECTION, EMULSION INTRAMUSCULAR; INTRAVENOUS; SUBCUTANEOUS ONCE
Status: COMPLETED | OUTPATIENT
Start: 2017-01-01 | End: 2017-01-01

## 2017-01-01 RX ORDER — SODIUM CHLORIDE 9 MG/ML
1000 INJECTION, SOLUTION INTRAVENOUS ONCE
Status: COMPLETED | OUTPATIENT
Start: 2017-01-01 | End: 2017-01-01

## 2017-01-01 RX ORDER — FAMOTIDINE 20 MG/1
20 TABLET, FILM COATED ORAL 2 TIMES DAILY
Status: DISCONTINUED | OUTPATIENT
Start: 2017-01-01 | End: 2017-01-01

## 2017-01-01 RX ORDER — BISACODYL 10 MG
10 SUPPOSITORY, RECTAL RECTAL
Status: DISCONTINUED | OUTPATIENT
Start: 2017-01-01 | End: 2017-01-01

## 2017-01-01 RX ORDER — DOXYCYCLINE 100 MG/1
100 TABLET ORAL DAILY
Status: DISCONTINUED | OUTPATIENT
Start: 2017-01-01 | End: 2017-01-01

## 2017-01-01 RX ORDER — ACETYLCYSTEINE 200 MG/ML
600 SOLUTION ORAL; RESPIRATORY (INHALATION) EVERY 8 HOURS
Status: COMPLETED | OUTPATIENT
Start: 2017-01-01 | End: 2017-01-01

## 2017-01-01 RX ORDER — LORAZEPAM 2 MG/ML
1 CONCENTRATE ORAL EVERY 8 HOURS PRN
Qty: 15 ML | Refills: 1 | Status: SHIPPED | OUTPATIENT
Start: 2017-01-01

## 2017-01-01 RX ORDER — LACTULOSE 20 G/30ML
15 SOLUTION ORAL EVERY EVENING
Status: DISCONTINUED | OUTPATIENT
Start: 2017-01-01 | End: 2017-01-01

## 2017-01-01 RX ORDER — SODIUM CHLORIDE 9 MG/ML
500 INJECTION, SOLUTION INTRAVENOUS ONCE
Status: COMPLETED | OUTPATIENT
Start: 2017-01-01 | End: 2017-01-01

## 2017-01-01 RX ORDER — LACTULOSE 20 G/30ML
30 SOLUTION ORAL 3 TIMES DAILY
Status: DISCONTINUED | OUTPATIENT
Start: 2017-01-01 | End: 2017-01-01

## 2017-01-01 RX ORDER — SODIUM CHLORIDE 9 MG/ML
INJECTION, SOLUTION INTRAVENOUS CONTINUOUS
Status: DISCONTINUED | OUTPATIENT
Start: 2017-01-01 | End: 2017-01-01

## 2017-01-01 RX ORDER — DEXTROSE MONOHYDRATE 25 G/50ML
25 INJECTION, SOLUTION INTRAVENOUS
Status: DISCONTINUED | OUTPATIENT
Start: 2017-01-01 | End: 2017-01-01 | Stop reason: HOSPADM

## 2017-01-01 RX ORDER — ALBUMIN, HUMAN INJ 5% 5 %
25 SOLUTION INTRAVENOUS ONCE
Status: COMPLETED | OUTPATIENT
Start: 2017-01-01 | End: 2017-01-01

## 2017-01-01 RX ORDER — POTASSIUM CHLORIDE 1.5 G/1.58G
20 POWDER, FOR SOLUTION ORAL 2 TIMES DAILY
Status: COMPLETED | OUTPATIENT
Start: 2017-01-01 | End: 2017-01-01

## 2017-01-01 RX ORDER — IBUPROFEN 200 MG
200 TABLET ORAL EVERY 6 HOURS PRN
Status: ON HOLD | COMMUNITY
End: 2017-01-01

## 2017-01-01 RX ORDER — SODIUM CHLORIDE 9 MG/ML
INJECTION, SOLUTION INTRAVENOUS
Status: COMPLETED
Start: 2017-01-01 | End: 2017-01-01

## 2017-01-01 RX ORDER — ALBUMIN (HUMAN) 12.5 G/50ML
100 SOLUTION INTRAVENOUS ONCE
Status: COMPLETED | OUTPATIENT
Start: 2017-01-01 | End: 2017-01-01

## 2017-01-01 RX ORDER — POTASSIUM CHLORIDE 7.45 MG/ML
10 INJECTION INTRAVENOUS ONCE
Status: COMPLETED | OUTPATIENT
Start: 2017-01-01 | End: 2017-01-01

## 2017-01-01 RX ORDER — HEPARIN SODIUM 1000 [USP'U]/ML
3000 INJECTION, SOLUTION INTRAVENOUS; SUBCUTANEOUS
Status: DISCONTINUED | OUTPATIENT
Start: 2017-01-01 | End: 2017-01-01

## 2017-01-01 RX ORDER — OMEPRAZOLE 20 MG/1
20 CAPSULE, DELAYED RELEASE ORAL DAILY
Status: DISCONTINUED | OUTPATIENT
Start: 2017-01-01 | End: 2017-01-01

## 2017-01-01 RX ORDER — ALBUMIN (HUMAN) 12.5 G/50ML
SOLUTION INTRAVENOUS
Status: COMPLETED
Start: 2017-01-01 | End: 2017-01-01

## 2017-01-01 RX ORDER — THIAMINE MONONITRATE (VIT B1) 100 MG
100 TABLET ORAL DAILY
Status: COMPLETED | OUTPATIENT
Start: 2017-01-01 | End: 2017-01-01

## 2017-01-01 RX ORDER — POTASSIUM CHLORIDE 1.5 G/1.58G
20 POWDER, FOR SOLUTION ORAL 2 TIMES DAILY
Status: DISCONTINUED | OUTPATIENT
Start: 2017-01-01 | End: 2017-01-01

## 2017-01-01 RX ORDER — HEPARIN SODIUM 1000 [USP'U]/ML
INJECTION, SOLUTION INTRAVENOUS; SUBCUTANEOUS
Status: COMPLETED
Start: 2017-01-01 | End: 2017-01-01

## 2017-01-01 RX ORDER — LACTULOSE 20 G/30ML
15 SOLUTION ORAL 3 TIMES DAILY
Status: DISCONTINUED | OUTPATIENT
Start: 2017-01-01 | End: 2017-01-01

## 2017-01-01 RX ORDER — LACTULOSE 20 G/30ML
15 SOLUTION ORAL 2 TIMES DAILY
Status: DISCONTINUED | OUTPATIENT
Start: 2017-01-01 | End: 2017-01-01 | Stop reason: HOSPADM

## 2017-01-01 RX ORDER — MORPHINE SULFATE 100 MG/5ML
10 SOLUTION ORAL EVERY 6 HOURS PRN
Qty: 30 ML | Refills: 1 | Status: SHIPPED | OUTPATIENT
Start: 2017-01-01

## 2017-01-01 RX ORDER — POTASSIUM CHLORIDE 1.5 G/1.58G
40 POWDER, FOR SOLUTION ORAL ONCE
Status: COMPLETED | OUTPATIENT
Start: 2017-01-01 | End: 2017-01-01

## 2017-01-01 RX ORDER — OMEPRAZOLE 20 MG/1
20 CAPSULE, DELAYED RELEASE ORAL 2 TIMES DAILY
Status: DISCONTINUED | OUTPATIENT
Start: 2017-01-01 | End: 2017-01-01

## 2017-01-01 RX ORDER — PANTOPRAZOLE SODIUM 40 MG/10ML
40 INJECTION, POWDER, LYOPHILIZED, FOR SOLUTION INTRAVENOUS 2 TIMES DAILY
Status: DISCONTINUED | OUTPATIENT
Start: 2017-01-01 | End: 2017-01-01

## 2017-01-01 RX ORDER — ALPRAZOLAM 0.5 MG/1
0.5 TABLET ORAL
Status: DISCONTINUED | OUTPATIENT
Start: 2017-01-01 | End: 2017-01-01

## 2017-01-01 RX ORDER — AMOXICILLIN 250 MG
2 CAPSULE ORAL 2 TIMES DAILY
Status: DISCONTINUED | OUTPATIENT
Start: 2017-01-01 | End: 2017-01-01

## 2017-01-01 RX ORDER — SODIUM CHLORIDE 9 MG/ML
250 INJECTION, SOLUTION INTRAVENOUS
Status: DISCONTINUED | OUTPATIENT
Start: 2017-01-01 | End: 2017-01-01 | Stop reason: HOSPADM

## 2017-01-01 RX ORDER — OCTREOTIDE ACETATE 50 UG/ML
50 INJECTION, SOLUTION INTRAVENOUS; SUBCUTANEOUS ONCE
Status: COMPLETED | OUTPATIENT
Start: 2017-01-01 | End: 2017-01-01

## 2017-01-01 RX ORDER — PENTOXIFYLLINE 400 MG/1
400 TABLET, EXTENDED RELEASE ORAL DAILY
Status: DISCONTINUED | OUTPATIENT
Start: 2017-01-01 | End: 2017-01-01 | Stop reason: HOSPADM

## 2017-01-01 RX ORDER — HEPARIN SODIUM 1000 [USP'U]/ML
INJECTION, SOLUTION INTRAVENOUS; SUBCUTANEOUS
Status: DISPENSED
Start: 2017-01-01 | End: 2017-01-01

## 2017-01-01 RX ORDER — ALBUMIN (HUMAN) 12.5 G/50ML
75 SOLUTION INTRAVENOUS ONCE
Status: COMPLETED | OUTPATIENT
Start: 2017-01-01 | End: 2017-01-01

## 2017-01-01 RX ORDER — POLYETHYLENE GLYCOL 3350 17 G/17G
1 POWDER, FOR SOLUTION ORAL
Status: DISCONTINUED | OUTPATIENT
Start: 2017-01-01 | End: 2017-01-01

## 2017-01-01 RX ORDER — FOLIC ACID 1 MG/1
1 TABLET ORAL DAILY
Status: COMPLETED | OUTPATIENT
Start: 2017-01-01 | End: 2017-01-01

## 2017-01-01 RX ADMIN — LACTULOSE 15 ML: 20 SOLUTION ORAL at 20:38

## 2017-01-01 RX ADMIN — ACETYLCYSTEINE 600 MG: 200 SOLUTION ORAL; RESPIRATORY (INHALATION) at 15:42

## 2017-01-01 RX ADMIN — PENTOXIFYLLINE 400 MG: 400 TABLET, FILM COATED, EXTENDED RELEASE ORAL at 08:13

## 2017-01-01 RX ADMIN — RIFAXIMIN 550 MG: 550 TABLET ORAL at 09:17

## 2017-01-01 RX ADMIN — VANCOMYCIN HYDROCHLORIDE 600 MG: 100 INJECTION, POWDER, LYOPHILIZED, FOR SOLUTION INTRAVENOUS at 23:37

## 2017-01-01 RX ADMIN — RIFAXIMIN 550 MG: 550 TABLET ORAL at 13:03

## 2017-01-01 RX ADMIN — PENTOXIFYLLINE 400 MG: 400 TABLET, FILM COATED, EXTENDED RELEASE ORAL at 08:51

## 2017-01-01 RX ADMIN — PIPERACILLIN SODIUM AND TAZOBACTAM SODIUM 3.38 G: 3; .375 INJECTION, POWDER, FOR SOLUTION INTRAVENOUS at 16:25

## 2017-01-01 RX ADMIN — VANCOMYCIN HYDROCHLORIDE 1900 MG: 100 INJECTION, POWDER, LYOPHILIZED, FOR SOLUTION INTRAVENOUS at 14:25

## 2017-01-01 RX ADMIN — SODIUM CHLORIDE: 9 INJECTION, SOLUTION INTRAVENOUS at 14:59

## 2017-01-01 RX ADMIN — RIFAXIMIN 550 MG: 550 TABLET ORAL at 21:22

## 2017-01-01 RX ADMIN — THERA TABS 1 TABLET: TAB at 09:19

## 2017-01-01 RX ADMIN — LACTULOSE 15 ML: 20 SOLUTION ORAL at 21:00

## 2017-01-01 RX ADMIN — SODIUM CHLORIDE 1000 ML: 9 INJECTION, SOLUTION INTRAVENOUS at 06:22

## 2017-01-01 RX ADMIN — PIPERACILLIN AND TAZOBACTAM 2.25 G: 2; .25 INJECTION, POWDER, LYOPHILIZED, FOR SOLUTION INTRAVENOUS; PARENTERAL at 20:38

## 2017-01-01 RX ADMIN — PIPERACILLIN SODIUM AND TAZOBACTAM SODIUM 4.5 G: 4; .5 INJECTION, POWDER, FOR SOLUTION INTRAVENOUS at 10:07

## 2017-01-01 RX ADMIN — THERA TABS 1 TABLET: TAB at 08:40

## 2017-01-01 RX ADMIN — AMPICILLIN SODIUM AND SULBACTAM SODIUM 3 G: 2; 1 INJECTION, POWDER, FOR SOLUTION INTRAMUSCULAR; INTRAVENOUS at 08:24

## 2017-01-01 RX ADMIN — PIPERACILLIN SODIUM AND TAZOBACTAM SODIUM 0.75 G: 3; .375 INJECTION, POWDER, FOR SOLUTION INTRAVENOUS at 17:33

## 2017-01-01 RX ADMIN — SODIUM CHLORIDE 500 ML: 9 INJECTION, SOLUTION INTRAVENOUS at 16:42

## 2017-01-01 RX ADMIN — PENTOXIFYLLINE 400 MG: 400 TABLET, FILM COATED, EXTENDED RELEASE ORAL at 08:39

## 2017-01-01 RX ADMIN — LACTULOSE 15 ML: 20 SOLUTION ORAL at 21:21

## 2017-01-01 RX ADMIN — LACTULOSE 15 ML: 20 SOLUTION ORAL at 09:23

## 2017-01-01 RX ADMIN — PIPERACILLIN SODIUM AND TAZOBACTAM SODIUM 3.38 G: 3; .375 INJECTION, POWDER, FOR SOLUTION INTRAVENOUS at 06:08

## 2017-01-01 RX ADMIN — DEXTROSE MONOHYDRATE, SODIUM CITRATE, AND CITRIC ACID MONOHYDRATE: 2.45; 2.2; .8 INJECTION, SOLUTION INTRAVENOUS at 10:59

## 2017-01-01 RX ADMIN — RIFAXIMIN 550 MG: 550 TABLET ORAL at 11:27

## 2017-01-01 RX ADMIN — AMPICILLIN SODIUM AND SULBACTAM SODIUM 3 G: 2; 1 INJECTION, POWDER, FOR SOLUTION INTRAMUSCULAR; INTRAVENOUS at 09:48

## 2017-01-01 RX ADMIN — LACTULOSE 15 ML: 20 SOLUTION ORAL at 08:13

## 2017-01-01 RX ADMIN — AMPICILLIN SODIUM AND SULBACTAM SODIUM 3 G: 2; 1 INJECTION, POWDER, FOR SOLUTION INTRAMUSCULAR; INTRAVENOUS at 21:00

## 2017-01-01 RX ADMIN — SODIUM CHLORIDE 500 ML: 9 INJECTION, SOLUTION INTRAVENOUS at 21:52

## 2017-01-01 RX ADMIN — PENTOXIFYLLINE 400 MG: 400 TABLET, FILM COATED, EXTENDED RELEASE ORAL at 09:17

## 2017-01-01 RX ADMIN — ACETYLCYSTEINE 600 MG: 200 SOLUTION ORAL; RESPIRATORY (INHALATION) at 06:08

## 2017-01-01 RX ADMIN — LACTULOSE 15 ML: 20 SOLUTION ORAL at 21:49

## 2017-01-01 RX ADMIN — AMPICILLIN SODIUM AND SULBACTAM SODIUM 3 G: 2; 1 INJECTION, POWDER, FOR SOLUTION INTRAMUSCULAR; INTRAVENOUS at 21:58

## 2017-01-01 RX ADMIN — Medication 12.5 G: at 13:30

## 2017-01-01 RX ADMIN — PANTOPRAZOLE SODIUM 40 MG: 40 INJECTION, POWDER, FOR SOLUTION INTRAVENOUS at 15:30

## 2017-01-01 RX ADMIN — HEPARIN SODIUM 3000 UNITS: 1000 INJECTION, SOLUTION INTRAVENOUS; SUBCUTANEOUS at 14:30

## 2017-01-01 RX ADMIN — PIPERACILLIN SODIUM AND TAZOBACTAM SODIUM 3.38 G: 3; .375 INJECTION, POWDER, FOR SOLUTION INTRAVENOUS at 06:06

## 2017-01-01 RX ADMIN — HEPARIN SODIUM 3000 UNITS: 1000 INJECTION, SOLUTION INTRAVENOUS; SUBCUTANEOUS at 14:20

## 2017-01-01 RX ADMIN — FOLIC ACID 1 MG: 1 TABLET ORAL at 08:03

## 2017-01-01 RX ADMIN — OMEPRAZOLE 20 MG: 20 CAPSULE, DELAYED RELEASE ORAL at 10:19

## 2017-01-01 RX ADMIN — AMPICILLIN SODIUM AND SULBACTAM SODIUM 3 G: 2; 1 INJECTION, POWDER, FOR SOLUTION INTRAMUSCULAR; INTRAVENOUS at 17:36

## 2017-01-01 RX ADMIN — VANCOMYCIN HYDROCHLORIDE 800 MG: 100 INJECTION, POWDER, LYOPHILIZED, FOR SOLUTION INTRAVENOUS at 15:41

## 2017-01-01 RX ADMIN — Medication 100 MG: at 12:41

## 2017-01-01 RX ADMIN — POTASSIUM CHLORIDE 20 MEQ: 1.5 POWDER, FOR SOLUTION ORAL at 20:38

## 2017-01-01 RX ADMIN — FOLIC ACID 1 MG: 1 TABLET ORAL at 12:41

## 2017-01-01 RX ADMIN — PENTOXIFYLLINE 400 MG: 400 TABLET, FILM COATED, EXTENDED RELEASE ORAL at 08:25

## 2017-01-01 RX ADMIN — OMEPRAZOLE 20 MG: 20 CAPSULE, DELAYED RELEASE ORAL at 21:48

## 2017-01-01 RX ADMIN — PIPERACILLIN AND TAZOBACTAM 2.25 G: 2; .25 INJECTION, POWDER, LYOPHILIZED, FOR SOLUTION INTRAVENOUS; PARENTERAL at 21:00

## 2017-01-01 RX ADMIN — LACTULOSE 15 ML: 20 SOLUTION ORAL at 12:23

## 2017-01-01 RX ADMIN — PIPERACILLIN SODIUM AND TAZOBACTAM SODIUM 0.75 G: 3; .375 INJECTION, POWDER, FOR SOLUTION INTRAVENOUS at 17:42

## 2017-01-01 RX ADMIN — LACTULOSE 30 ML: 20 SOLUTION ORAL at 09:19

## 2017-01-01 RX ADMIN — RIFAXIMIN 550 MG: 550 TABLET ORAL at 21:15

## 2017-01-01 RX ADMIN — PIPERACILLIN AND TAZOBACTAM 2.25 G: 2; .25 INJECTION, POWDER, LYOPHILIZED, FOR SOLUTION INTRAVENOUS; PARENTERAL at 08:47

## 2017-01-01 RX ADMIN — OMEPRAZOLE 20 MG: 20 CAPSULE, DELAYED RELEASE ORAL at 13:22

## 2017-01-01 RX ADMIN — PIPERACILLIN AND TAZOBACTAM 2.25 G: 2; .25 INJECTION, POWDER, LYOPHILIZED, FOR SOLUTION INTRAVENOUS; PARENTERAL at 21:37

## 2017-01-01 RX ADMIN — AMPICILLIN SODIUM AND SULBACTAM SODIUM 3 G: 2; 1 INJECTION, POWDER, FOR SOLUTION INTRAMUSCULAR; INTRAVENOUS at 23:12

## 2017-01-01 RX ADMIN — LACTULOSE 15 ML: 20 SOLUTION ORAL at 08:04

## 2017-01-01 RX ADMIN — Medication 12.5 G: at 12:15

## 2017-01-01 RX ADMIN — PIPERACILLIN AND TAZOBACTAM 2.25 G: 2; .25 INJECTION, POWDER, LYOPHILIZED, FOR SOLUTION INTRAVENOUS; PARENTERAL at 10:08

## 2017-01-01 RX ADMIN — DEXTROSE MONOHYDRATE, SODIUM CITRATE, AND CITRIC ACID MONOHYDRATE: 2.45; 2.2; .8 INJECTION, SOLUTION INTRAVENOUS at 15:20

## 2017-01-01 RX ADMIN — RIFAXIMIN 550 MG: 550 TABLET ORAL at 09:24

## 2017-01-01 RX ADMIN — PENTOXIFYLLINE 400 MG: 400 TABLET, FILM COATED, EXTENDED RELEASE ORAL at 09:54

## 2017-01-01 RX ADMIN — LACTULOSE 30 ML: 20 SOLUTION ORAL at 23:12

## 2017-01-01 RX ADMIN — RIFAXIMIN 550 MG: 550 TABLET ORAL at 20:17

## 2017-01-01 RX ADMIN — OMEPRAZOLE 20 MG: 20 CAPSULE, DELAYED RELEASE ORAL at 21:17

## 2017-01-01 RX ADMIN — PIPERACILLIN AND TAZOBACTAM 2.25 G: 2; .25 INJECTION, POWDER, LYOPHILIZED, FOR SOLUTION INTRAVENOUS; PARENTERAL at 09:54

## 2017-01-01 RX ADMIN — FOLIC ACID 1 MG: 1 TABLET ORAL at 08:40

## 2017-01-01 RX ADMIN — Medication 100 MG: at 08:40

## 2017-01-01 RX ADMIN — POTASSIUM CHLORIDE 20 MEQ: 1.5 POWDER, FOR SOLUTION ORAL at 11:09

## 2017-01-01 RX ADMIN — SODIUM CHLORIDE: 9 INJECTION, SOLUTION INTRAVENOUS at 12:30

## 2017-01-01 RX ADMIN — ACETYLCYSTEINE 600 MG: 200 SOLUTION ORAL; RESPIRATORY (INHALATION) at 23:10

## 2017-01-01 RX ADMIN — PENTOXIFYLLINE 400 MG: 400 TABLET, FILM COATED, EXTENDED RELEASE ORAL at 11:27

## 2017-01-01 RX ADMIN — AMPICILLIN SODIUM AND SULBACTAM SODIUM 3 G: 2; 1 INJECTION, POWDER, FOR SOLUTION INTRAMUSCULAR; INTRAVENOUS at 21:48

## 2017-01-01 RX ADMIN — RIFAXIMIN 550 MG: 550 TABLET ORAL at 20:38

## 2017-01-01 RX ADMIN — PIPERACILLIN AND TAZOBACTAM 2.25 G: 2; .25 INJECTION, POWDER, LYOPHILIZED, FOR SOLUTION INTRAVENOUS; PARENTERAL at 21:57

## 2017-01-01 RX ADMIN — FOLIC ACID 1 MG: 1 TABLET ORAL at 09:19

## 2017-01-01 RX ADMIN — RIFAXIMIN 550 MG: 550 TABLET ORAL at 21:49

## 2017-01-01 RX ADMIN — OCTREOTIDE ACETATE 50 MCG: 50 INJECTION, SOLUTION INTRAVENOUS; SUBCUTANEOUS at 15:32

## 2017-01-01 RX ADMIN — OCTREOTIDE ACETATE 50 MCG/HR: 200 INJECTION, SOLUTION INTRAVENOUS; SUBCUTANEOUS at 14:34

## 2017-01-01 RX ADMIN — AMPICILLIN SODIUM AND SULBACTAM SODIUM 3 G: 2; 1 INJECTION, POWDER, FOR SOLUTION INTRAMUSCULAR; INTRAVENOUS at 20:59

## 2017-01-01 RX ADMIN — OMEPRAZOLE 20 MG: 20 CAPSULE, DELAYED RELEASE ORAL at 08:50

## 2017-01-01 RX ADMIN — PIPERACILLIN AND TAZOBACTAM 2.25 G: 2; .25 INJECTION, POWDER, LYOPHILIZED, FOR SOLUTION INTRAVENOUS; PARENTERAL at 13:18

## 2017-01-01 RX ADMIN — ALBUMIN (HUMAN) 12.5 G: 12.5 SOLUTION INTRAVENOUS at 15:15

## 2017-01-01 RX ADMIN — AMPICILLIN SODIUM AND SULBACTAM SODIUM 3 G: 2; 1 INJECTION, POWDER, FOR SOLUTION INTRAMUSCULAR; INTRAVENOUS at 07:51

## 2017-01-01 RX ADMIN — PIPERACILLIN SODIUM AND TAZOBACTAM SODIUM 3.38 G: 3; .375 INJECTION, POWDER, FOR SOLUTION INTRAVENOUS at 00:01

## 2017-01-01 RX ADMIN — AMPICILLIN SODIUM AND SULBACTAM SODIUM 3 G: 2; 1 INJECTION, POWDER, FOR SOLUTION INTRAMUSCULAR; INTRAVENOUS at 11:55

## 2017-01-01 RX ADMIN — OMEPRAZOLE 20 MG: 20 CAPSULE, DELAYED RELEASE ORAL at 11:28

## 2017-01-01 RX ADMIN — FAMOTIDINE 20 MG: 20 TABLET, FILM COATED ORAL at 08:40

## 2017-01-01 RX ADMIN — AMPICILLIN SODIUM AND SULBACTAM SODIUM 3 G: 2; 1 INJECTION, POWDER, FOR SOLUTION INTRAMUSCULAR; INTRAVENOUS at 08:13

## 2017-01-01 RX ADMIN — FAMOTIDINE 20 MG: 20 TABLET, FILM COATED ORAL at 09:19

## 2017-01-01 RX ADMIN — OMEPRAZOLE 20 MG: 20 CAPSULE, DELAYED RELEASE ORAL at 20:17

## 2017-01-01 RX ADMIN — RIFAXIMIN 550 MG: 550 TABLET ORAL at 09:54

## 2017-01-01 RX ADMIN — RIFAXIMIN 550 MG: 550 TABLET ORAL at 21:57

## 2017-01-01 RX ADMIN — RIFAXIMIN 550 MG: 550 TABLET ORAL at 08:04

## 2017-01-01 RX ADMIN — LACTULOSE 15 ML: 20 SOLUTION ORAL at 21:15

## 2017-01-01 RX ADMIN — AMPICILLIN SODIUM AND SULBACTAM SODIUM 3 G: 2; 1 INJECTION, POWDER, FOR SOLUTION INTRAMUSCULAR; INTRAVENOUS at 18:34

## 2017-01-01 RX ADMIN — OCTREOTIDE ACETATE 50 MCG/HR: 200 INJECTION, SOLUTION INTRAVENOUS; SUBCUTANEOUS at 16:06

## 2017-01-01 RX ADMIN — PIPERACILLIN AND TAZOBACTAM 2.25 G: 2; .25 INJECTION, POWDER, LYOPHILIZED, FOR SOLUTION INTRAVENOUS; PARENTERAL at 21:51

## 2017-01-01 RX ADMIN — FOLIC ACID: 5 INJECTION, SOLUTION INTRAMUSCULAR; INTRAVENOUS; SUBCUTANEOUS at 14:59

## 2017-01-01 RX ADMIN — ALBUMIN (HUMAN) 12.5 G: 12.5 SOLUTION INTRAVENOUS at 16:10

## 2017-01-01 RX ADMIN — OMEPRAZOLE 20 MG: 20 CAPSULE, DELAYED RELEASE ORAL at 21:21

## 2017-01-01 RX ADMIN — DOXYCYCLINE 100 MG: 100 INJECTION, POWDER, LYOPHILIZED, FOR SOLUTION INTRAVENOUS at 09:19

## 2017-01-01 RX ADMIN — SODIUM CHLORIDE: 9 INJECTION, SOLUTION INTRAVENOUS at 14:09

## 2017-01-01 RX ADMIN — AMPICILLIN SODIUM AND SULBACTAM SODIUM 3 G: 2; 1 INJECTION, POWDER, FOR SOLUTION INTRAMUSCULAR; INTRAVENOUS at 09:08

## 2017-01-01 RX ADMIN — PIPERACILLIN AND TAZOBACTAM 2.25 G: 2; .25 INJECTION, POWDER, LYOPHILIZED, FOR SOLUTION INTRAVENOUS; PARENTERAL at 10:18

## 2017-01-01 RX ADMIN — PENTOXIFYLLINE 400 MG: 400 TABLET, FILM COATED, EXTENDED RELEASE ORAL at 08:04

## 2017-01-01 RX ADMIN — CEFTRIAXONE SODIUM 2 G: 2 INJECTION, POWDER, FOR SOLUTION INTRAMUSCULAR; INTRAVENOUS at 15:16

## 2017-01-01 RX ADMIN — POTASSIUM CHLORIDE 10 MEQ: 7.46 INJECTION, SOLUTION INTRAVENOUS at 07:58

## 2017-01-01 RX ADMIN — RIFAXIMIN 550 MG: 550 TABLET ORAL at 10:19

## 2017-01-01 RX ADMIN — THERA TABS 1 TABLET: TAB at 08:02

## 2017-01-01 RX ADMIN — IOHEXOL 100 ML: 350 INJECTION, SOLUTION INTRAVENOUS at 16:36

## 2017-01-01 RX ADMIN — PIPERACILLIN AND TAZOBACTAM 2.25 G: 2; .25 INJECTION, POWDER, LYOPHILIZED, FOR SOLUTION INTRAVENOUS; PARENTERAL at 20:17

## 2017-01-01 RX ADMIN — Medication 100 MG: at 09:19

## 2017-01-01 RX ADMIN — PENTOXIFYLLINE 400 MG: 400 TABLET, FILM COATED, EXTENDED RELEASE ORAL at 10:08

## 2017-01-01 RX ADMIN — PIPERACILLIN SODIUM AND TAZOBACTAM SODIUM 0.75 G: 3; .375 INJECTION, POWDER, FOR SOLUTION INTRAVENOUS at 13:28

## 2017-01-01 RX ADMIN — PENTOXIFYLLINE 400 MG: 400 TABLET, FILM COATED, EXTENDED RELEASE ORAL at 13:02

## 2017-01-01 RX ADMIN — PIPERACILLIN AND TAZOBACTAM 2.25 G: 2; .25 INJECTION, POWDER, LYOPHILIZED, FOR SOLUTION INTRAVENOUS; PARENTERAL at 21:22

## 2017-01-01 RX ADMIN — PIPERACILLIN AND TAZOBACTAM 2.25 G: 2; .25 INJECTION, POWDER, LYOPHILIZED, FOR SOLUTION INTRAVENOUS; PARENTERAL at 22:36

## 2017-01-01 RX ADMIN — OMEPRAZOLE 20 MG: 20 CAPSULE, DELAYED RELEASE ORAL at 21:37

## 2017-01-01 RX ADMIN — LACTULOSE 15 ML: 20 SOLUTION ORAL at 13:04

## 2017-01-01 RX ADMIN — LACTULOSE 15 ML: 20 SOLUTION ORAL at 08:44

## 2017-01-01 RX ADMIN — LACTULOSE 15 ML: 20 SOLUTION ORAL at 22:26

## 2017-01-01 RX ADMIN — AMPICILLIN SODIUM AND SULBACTAM SODIUM 3 G: 2; 1 INJECTION, POWDER, FOR SOLUTION INTRAMUSCULAR; INTRAVENOUS at 00:06

## 2017-01-01 RX ADMIN — PIPERACILLIN AND TAZOBACTAM 2.25 G: 2; .25 INJECTION, POWDER, LYOPHILIZED, FOR SOLUTION INTRAVENOUS; PARENTERAL at 21:15

## 2017-01-01 RX ADMIN — PIPERACILLIN AND TAZOBACTAM 2.25 G: 2; .25 INJECTION, POWDER, LYOPHILIZED, FOR SOLUTION INTRAVENOUS; PARENTERAL at 12:27

## 2017-01-01 RX ADMIN — LACTULOSE 15 ML: 20 SOLUTION ORAL at 13:20

## 2017-01-01 RX ADMIN — POTASSIUM CHLORIDE 40 MEQ: 1.5 POWDER, FOR SOLUTION ORAL at 08:03

## 2017-01-01 RX ADMIN — DEXTROSE MONOHYDRATE, SODIUM CITRATE, AND CITRIC ACID MONOHYDRATE: 2.45; 2.2; .8 INJECTION, SOLUTION INTRAVENOUS at 11:43

## 2017-01-01 RX ADMIN — RIFAXIMIN 550 MG: 550 TABLET ORAL at 10:08

## 2017-01-01 RX ADMIN — PENTOXIFYLLINE 400 MG: 400 TABLET, FILM COATED, EXTENDED RELEASE ORAL at 07:52

## 2017-01-01 RX ADMIN — RIFAXIMIN 550 MG: 550 TABLET ORAL at 08:13

## 2017-01-01 RX ADMIN — OMEPRAZOLE 20 MG: 20 CAPSULE, DELAYED RELEASE ORAL at 21:15

## 2017-01-01 RX ADMIN — SODIUM CHLORIDE: 9 INJECTION, SOLUTION INTRAVENOUS at 05:45

## 2017-01-01 RX ADMIN — RIFAXIMIN 550 MG: 550 TABLET ORAL at 21:00

## 2017-01-01 RX ADMIN — PENTOXIFYLLINE 400 MG: 400 TABLET, FILM COATED, EXTENDED RELEASE ORAL at 08:43

## 2017-01-01 RX ADMIN — LACTULOSE 15 ML: 20 SOLUTION ORAL at 20:17

## 2017-01-01 RX ADMIN — OMEPRAZOLE 20 MG: 20 CAPSULE, DELAYED RELEASE ORAL at 21:49

## 2017-01-01 RX ADMIN — VANCOMYCIN HYDROCHLORIDE 600 MG: 100 INJECTION, POWDER, LYOPHILIZED, FOR SOLUTION INTRAVENOUS at 13:00

## 2017-01-01 RX ADMIN — PIPERACILLIN AND TAZOBACTAM 2.25 G: 2; .25 INJECTION, POWDER, LYOPHILIZED, FOR SOLUTION INTRAVENOUS; PARENTERAL at 12:23

## 2017-01-01 RX ADMIN — OMEPRAZOLE 20 MG: 20 CAPSULE, DELAYED RELEASE ORAL at 13:04

## 2017-01-01 RX ADMIN — OMEPRAZOLE 20 MG: 20 CAPSULE, DELAYED RELEASE ORAL at 20:43

## 2017-01-01 RX ADMIN — LACTULOSE 15 ML: 20 SOLUTION ORAL at 10:08

## 2017-01-01 RX ADMIN — PENTOXIFYLLINE 400 MG: 400 TABLET, FILM COATED, EXTENDED RELEASE ORAL at 09:24

## 2017-01-01 RX ADMIN — VANCOMYCIN HYDROCHLORIDE 1500 MG: 100 INJECTION, POWDER, LYOPHILIZED, FOR SOLUTION INTRAVENOUS at 11:14

## 2017-01-01 RX ADMIN — FAMOTIDINE 20 MG: 20 TABLET, FILM COATED ORAL at 20:45

## 2017-01-01 RX ADMIN — VANCOMYCIN HYDROCHLORIDE 1000 MG: 100 INJECTION, POWDER, LYOPHILIZED, FOR SOLUTION INTRAVENOUS at 10:03

## 2017-01-01 RX ADMIN — DOXYCYCLINE 100 MG: 100 INJECTION, POWDER, LYOPHILIZED, FOR SOLUTION INTRAVENOUS at 20:01

## 2017-01-01 RX ADMIN — CEFTRIAXONE SODIUM 2 G: 2 INJECTION, POWDER, FOR SOLUTION INTRAMUSCULAR; INTRAVENOUS at 08:43

## 2017-01-01 RX ADMIN — OMEPRAZOLE 20 MG: 20 CAPSULE, DELAYED RELEASE ORAL at 21:00

## 2017-01-01 RX ADMIN — PIPERACILLIN AND TAZOBACTAM 2.25 G: 2; .25 INJECTION, POWDER, LYOPHILIZED, FOR SOLUTION INTRAVENOUS; PARENTERAL at 09:21

## 2017-01-01 RX ADMIN — RIFAXIMIN 550 MG: 550 TABLET ORAL at 21:17

## 2017-01-01 RX ADMIN — RIFAXIMIN 550 MG: 550 TABLET ORAL at 08:43

## 2017-01-01 RX ADMIN — LACTULOSE 30 ML: 20 SOLUTION ORAL at 14:10

## 2017-01-01 RX ADMIN — FAMOTIDINE 20 MG: 20 TABLET, FILM COATED ORAL at 23:12

## 2017-01-01 RX ADMIN — AMPICILLIN SODIUM AND SULBACTAM SODIUM 3 G: 2; 1 INJECTION, POWDER, FOR SOLUTION INTRAMUSCULAR; INTRAVENOUS at 09:18

## 2017-01-01 RX ADMIN — PENTOXIFYLLINE 400 MG: 400 TABLET, FILM COATED, EXTENDED RELEASE ORAL at 10:19

## 2017-01-01 RX ADMIN — LACTULOSE 15 ML: 20 SOLUTION ORAL at 08:25

## 2017-01-01 RX ADMIN — HEPARIN: 100 SYRINGE at 10:45

## 2017-01-01 RX ADMIN — RIFAXIMIN 550 MG: 550 TABLET ORAL at 20:47

## 2017-01-01 RX ADMIN — OMEPRAZOLE 20 MG: 20 CAPSULE, DELAYED RELEASE ORAL at 07:19

## 2017-01-01 RX ADMIN — PENTOXIFYLLINE 400 MG: 400 TABLET, FILM COATED, EXTENDED RELEASE ORAL at 13:48

## 2017-01-01 RX ADMIN — RIFAXIMIN 550 MG: 550 TABLET ORAL at 08:51

## 2017-01-01 RX ADMIN — PIPERACILLIN AND TAZOBACTAM 2.25 G: 2; .25 INJECTION, POWDER, LYOPHILIZED, FOR SOLUTION INTRAVENOUS; PARENTERAL at 11:22

## 2017-01-01 RX ADMIN — PHYTONADIONE 5 MG: 10 INJECTION, EMULSION INTRAMUSCULAR; INTRAVENOUS; SUBCUTANEOUS at 16:49

## 2017-01-01 RX ADMIN — PIPERACILLIN AND TAZOBACTAM 2.25 G: 2; .25 INJECTION, POWDER, LYOPHILIZED, FOR SOLUTION INTRAVENOUS; PARENTERAL at 08:25

## 2017-01-01 RX ADMIN — AMPICILLIN SODIUM AND SULBACTAM SODIUM 3 G: 2; 1 INJECTION, POWDER, FOR SOLUTION INTRAMUSCULAR; INTRAVENOUS at 22:26

## 2017-01-01 RX ADMIN — AMPICILLIN SODIUM AND SULBACTAM SODIUM 3 G: 2; 1 INJECTION, POWDER, FOR SOLUTION INTRAMUSCULAR; INTRAVENOUS at 06:08

## 2017-01-01 RX ADMIN — LACTULOSE 15 ML: 20 SOLUTION ORAL at 08:40

## 2017-01-01 RX ADMIN — SODIUM CHLORIDE 500 ML: 9 INJECTION, SOLUTION INTRAVENOUS at 12:50

## 2017-01-01 RX ADMIN — SODIUM CHLORIDE: 9 INJECTION, SOLUTION INTRAVENOUS at 15:30

## 2017-01-01 RX ADMIN — SODIUM CHLORIDE 10 ML: 9 INJECTION, SOLUTION INTRAVENOUS at 07:30

## 2017-01-01 RX ADMIN — HEPARIN SODIUM 3000 UNITS: 1000 INJECTION, SOLUTION INTRAVENOUS; SUBCUTANEOUS at 11:20

## 2017-01-01 RX ADMIN — OMEPRAZOLE 20 MG: 20 CAPSULE, DELAYED RELEASE ORAL at 22:36

## 2017-01-01 RX ADMIN — ALBUMIN (HUMAN) 100 G: 12.5 SOLUTION INTRAVENOUS at 10:46

## 2017-01-01 RX ADMIN — LACTULOSE 15 ML: 20 SOLUTION ORAL at 21:58

## 2017-01-01 RX ADMIN — OMEPRAZOLE 20 MG: 20 CAPSULE, DELAYED RELEASE ORAL at 10:08

## 2017-01-01 RX ADMIN — Medication 100 MG: at 08:03

## 2017-01-01 RX ADMIN — OMEPRAZOLE 20 MG: 20 CAPSULE, DELAYED RELEASE ORAL at 08:13

## 2017-01-01 RX ADMIN — OMEPRAZOLE 20 MG: 20 CAPSULE, DELAYED RELEASE ORAL at 20:38

## 2017-01-01 RX ADMIN — OMEPRAZOLE 20 MG: 20 CAPSULE, DELAYED RELEASE ORAL at 08:24

## 2017-01-01 RX ADMIN — LACTULOSE 15 ML: 20 SOLUTION ORAL at 20:47

## 2017-01-01 RX ADMIN — RIFAXIMIN 550 MG: 550 TABLET ORAL at 21:21

## 2017-01-01 RX ADMIN — THERA TABS 1 TABLET: TAB at 12:41

## 2017-01-01 RX ADMIN — AMPICILLIN SODIUM AND SULBACTAM SODIUM 3 G: 2; 1 INJECTION, POWDER, FOR SOLUTION INTRAMUSCULAR; INTRAVENOUS at 06:18

## 2017-01-01 RX ADMIN — ACETYLCYSTEINE 600 MG: 200 SOLUTION ORAL; RESPIRATORY (INHALATION) at 14:13

## 2017-01-01 RX ADMIN — LACTULOSE 30 ML: 20 SOLUTION ORAL at 15:30

## 2017-01-01 RX ADMIN — RIFAXIMIN 550 MG: 550 TABLET ORAL at 07:52

## 2017-01-01 RX ADMIN — THIAMINE HYDROCHLORIDE 100 MG: 100 INJECTION, SOLUTION INTRAMUSCULAR; INTRAVENOUS at 15:00

## 2017-01-01 RX ADMIN — RIFAXIMIN 550 MG: 550 TABLET ORAL at 22:36

## 2017-01-01 RX ADMIN — FAMOTIDINE 20 MG: 20 TABLET, FILM COATED ORAL at 08:02

## 2017-01-01 RX ADMIN — LACTULOSE 30 ML: 20 SOLUTION ORAL at 21:00

## 2017-01-01 RX ADMIN — PIPERACILLIN AND TAZOBACTAM 2.25 G: 2; .25 INJECTION, POWDER, LYOPHILIZED, FOR SOLUTION INTRAVENOUS; PARENTERAL at 13:03

## 2017-01-01 RX ADMIN — OMEPRAZOLE 20 MG: 20 CAPSULE, DELAYED RELEASE ORAL at 08:04

## 2017-01-01 RX ADMIN — PENTOXIFYLLINE 400 MG: 400 TABLET, FILM COATED, EXTENDED RELEASE ORAL at 08:24

## 2017-01-01 RX ADMIN — OMEPRAZOLE 20 MG: 20 CAPSULE, DELAYED RELEASE ORAL at 09:24

## 2017-01-01 RX ADMIN — RIFAXIMIN 550 MG: 550 TABLET ORAL at 07:18

## 2017-01-01 RX ADMIN — PNEUMOCOCCAL 13-VALENT CONJUGATE VACCINE 0.5 ML: 2.2; 2.2; 2.2; 2.2; 2.2; 4.4; 2.2; 2.2; 2.2; 2.2; 2.2; 2.2; 2.2 INJECTION, SUSPENSION INTRAMUSCULAR at 09:16

## 2017-01-01 RX ADMIN — PANTOPRAZOLE SODIUM 40 MG: 40 INJECTION, POWDER, FOR SOLUTION INTRAVENOUS at 21:00

## 2017-01-01 RX ADMIN — ALBUMIN (HUMAN) 75 G: 12.5 SOLUTION INTRAVENOUS at 07:19

## 2017-01-01 RX ADMIN — LACTULOSE 15 ML: 20 SOLUTION ORAL at 08:52

## 2017-01-01 RX ADMIN — POTASSIUM CHLORIDE 20 MEQ: 1.5 POWDER, FOR SOLUTION ORAL at 20:45

## 2017-01-01 RX ADMIN — FAMOTIDINE 20 MG: 10 INJECTION INTRAVENOUS at 07:57

## 2017-01-01 RX ADMIN — SODIUM CHLORIDE: 9 INJECTION, SOLUTION INTRAVENOUS at 00:06

## 2017-01-01 RX ADMIN — LACTULOSE 15 ML: 20 SOLUTION ORAL at 15:00

## 2017-01-01 RX ADMIN — POTASSIUM CHLORIDE 20 MEQ: 1.5 POWDER, FOR SOLUTION ORAL at 10:19

## 2017-01-01 RX ADMIN — PANTOPRAZOLE SODIUM 40 MG: 40 INJECTION, POWDER, FOR SOLUTION INTRAVENOUS at 07:51

## 2017-01-01 RX ADMIN — OMEPRAZOLE 20 MG: 20 CAPSULE, DELAYED RELEASE ORAL at 20:47

## 2017-01-01 RX ADMIN — FAMOTIDINE 20 MG: 20 TABLET, FILM COATED ORAL at 21:00

## 2017-01-01 RX ADMIN — LACTULOSE 15 ML: 20 SOLUTION ORAL at 10:18

## 2017-01-01 RX ADMIN — RIFAXIMIN 550 MG: 550 TABLET ORAL at 12:22

## 2017-01-01 RX ADMIN — DEXTROSE MONOHYDRATE, SODIUM CITRATE, AND CITRIC ACID MONOHYDRATE: 2.45; 2.2; .8 INJECTION, SOLUTION INTRAVENOUS at 11:55

## 2017-01-01 RX ADMIN — PENTOXIFYLLINE 400 MG: 400 TABLET, FILM COATED, EXTENDED RELEASE ORAL at 13:21

## 2017-01-01 RX ADMIN — RIFAXIMIN 550 MG: 550 TABLET ORAL at 08:25

## 2017-01-01 RX ADMIN — PANTOPRAZOLE SODIUM 40 MG: 40 INJECTION, POWDER, FOR SOLUTION INTRAVENOUS at 21:21

## 2017-01-01 RX ADMIN — PIPERACILLIN SODIUM AND TAZOBACTAM SODIUM 3.38 G: 3; .375 INJECTION, POWDER, FOR SOLUTION INTRAVENOUS at 17:55

## 2017-01-01 RX ADMIN — OMEPRAZOLE 20 MG: 20 CAPSULE, DELAYED RELEASE ORAL at 21:22

## 2017-01-01 RX ADMIN — OMEPRAZOLE 20 MG: 20 CAPSULE, DELAYED RELEASE ORAL at 08:44

## 2017-01-01 RX ADMIN — LACTULOSE 15 ML: 20 SOLUTION ORAL at 11:34

## 2017-01-01 RX ADMIN — PIPERACILLIN AND TAZOBACTAM 2.25 G: 2; .25 INJECTION, POWDER, LYOPHILIZED, FOR SOLUTION INTRAVENOUS; PARENTERAL at 08:04

## 2017-01-01 RX ADMIN — PIPERACILLIN SODIUM AND TAZOBACTAM SODIUM 3.38 G: 3; .375 INJECTION, POWDER, FOR SOLUTION INTRAVENOUS at 00:38

## 2017-01-01 RX ADMIN — OMEPRAZOLE 20 MG: 20 CAPSULE, DELAYED RELEASE ORAL at 09:54

## 2017-01-01 RX ADMIN — LACTULOSE 15 ML: 20 SOLUTION ORAL at 21:57

## 2017-01-01 RX ADMIN — ALPRAZOLAM 0.5 MG: 0.5 TABLET ORAL at 15:25

## 2017-01-01 RX ADMIN — LACTULOSE 15 ML: 20 SOLUTION ORAL at 20:45

## 2017-01-01 RX ADMIN — PIPERACILLIN SODIUM AND TAZOBACTAM SODIUM 0.75 G: 3; .375 INJECTION, POWDER, FOR SOLUTION INTRAVENOUS at 18:12

## 2017-01-01 RX ADMIN — PIPERACILLIN SODIUM AND TAZOBACTAM SODIUM 3.38 G: 3; .375 INJECTION, POWDER, FOR SOLUTION INTRAVENOUS at 12:40

## 2017-01-01 RX ADMIN — PENTOXIFYLLINE 400 MG: 400 TABLET, FILM COATED, EXTENDED RELEASE ORAL at 07:19

## 2017-01-01 RX ADMIN — LACTULOSE 15 ML: 20 SOLUTION ORAL at 22:36

## 2017-01-01 RX ADMIN — OMEPRAZOLE 20 MG: 20 CAPSULE, DELAYED RELEASE ORAL at 12:22

## 2017-01-01 RX ADMIN — RIFAXIMIN 550 MG: 550 TABLET ORAL at 20:44

## 2017-01-01 RX ADMIN — AMPICILLIN SODIUM AND SULBACTAM SODIUM 3 G: 2; 1 INJECTION, POWDER, FOR SOLUTION INTRAMUSCULAR; INTRAVENOUS at 21:17

## 2017-01-01 RX ADMIN — PANTOPRAZOLE SODIUM 40 MG: 40 INJECTION, POWDER, FOR SOLUTION INTRAVENOUS at 09:18

## 2017-01-01 RX ADMIN — SODIUM CHLORIDE: 9 INJECTION, SOLUTION INTRAVENOUS at 08:00

## 2017-01-01 RX ADMIN — LACTULOSE 15 ML: 20 SOLUTION ORAL at 07:18

## 2017-01-01 RX ADMIN — RIFAXIMIN 550 MG: 550 TABLET ORAL at 13:21

## 2017-01-01 RX ADMIN — AMPICILLIN SODIUM AND SULBACTAM SODIUM 3 G: 2; 1 INJECTION, POWDER, FOR SOLUTION INTRAMUSCULAR; INTRAVENOUS at 21:21

## 2017-01-01 RX ADMIN — AMPICILLIN SODIUM AND SULBACTAM SODIUM 3 G: 2; 1 INJECTION, POWDER, FOR SOLUTION INTRAMUSCULAR; INTRAVENOUS at 11:09

## 2017-01-01 RX ADMIN — PIPERACILLIN AND TAZOBACTAM 2.25 G: 2; .25 INJECTION, POWDER, LYOPHILIZED, FOR SOLUTION INTRAVENOUS; PARENTERAL at 21:49

## 2017-01-01 RX ADMIN — PENTOXIFYLLINE 400 MG: 400 TABLET, FILM COATED, EXTENDED RELEASE ORAL at 12:22

## 2017-01-01 RX ADMIN — PHYTONADIONE 5 MG: 10 INJECTION, EMULSION INTRAMUSCULAR; INTRAVENOUS; SUBCUTANEOUS at 16:57

## 2017-01-01 RX ADMIN — OMEPRAZOLE 20 MG: 20 CAPSULE, DELAYED RELEASE ORAL at 21:57

## 2017-01-01 RX ADMIN — LACTULOSE 15 ML: 20 SOLUTION ORAL at 20:43

## 2017-01-01 RX ADMIN — SODIUM CHLORIDE: 9 INJECTION, SOLUTION INTRAVENOUS at 00:41

## 2017-01-01 RX ADMIN — RIFAXIMIN 550 MG: 550 TABLET ORAL at 21:37

## 2017-01-01 RX ADMIN — PIPERACILLIN AND TAZOBACTAM 2.25 G: 2; .25 INJECTION, POWDER, LYOPHILIZED, FOR SOLUTION INTRAVENOUS; PARENTERAL at 21:21

## 2017-01-01 SDOH — ECONOMIC STABILITY: HOUSING INSECURITY: UNSAFE COOKING RANGE AREA: 0

## 2017-01-01 SDOH — ECONOMIC STABILITY: GENERAL

## 2017-01-01 SDOH — ECONOMIC STABILITY: HOUSING INSECURITY: UNSAFE APPLIANCES: 0

## 2017-01-01 ASSESSMENT — ENCOUNTER SYMPTOMS
CHILLS: 0
CLAUDICATION: 0
DEPRESSION: 0
WEAKNESS: 1
MYALGIAS: 0
ABDOMINAL PAIN: 0
DEPRESSION: 0
DIARRHEA: 0
VOMITING: 0
VOMITING: 0
CHILLS: 0
ORTHOPNEA: 0
FALLS: 1
PALPITATIONS: 0
ABDOMINAL PAIN: 0
FOCAL WEAKNESS: 0
WEAKNESS: 1
FALLS: 0
DOUBLE VISION: 0
FEVER: 0
NERVOUS/ANXIOUS: 1
DOUBLE VISION: 0
EYE PAIN: 0
BLURRED VISION: 0
PHOTOPHOBIA: 0
DIZZINESS: 0
SENSORY CHANGE: 0
SORE THROAT: 0
BACK PAIN: 0
BLURRED VISION: 0
FEVER: 0
EYE PAIN: 0
VOMITING: 0
ORTHOPNEA: 0
ORTHOPNEA: 0
TREMORS: 0
NAUSEA: 0
PALPITATIONS: 0
DOUBLE VISION: 0
HEMOPTYSIS: 0
EYE DISCHARGE: 0
DEPRESSION: 0
WEIGHT LOSS: 0
FOCAL WEAKNESS: 0
DIZZINESS: 0
FEVER: 0
MYALGIAS: 0
HEADACHES: 0
HEADACHES: 0
PHOTOPHOBIA: 0
SENSORY CHANGE: 0
SENSORY CHANGE: 0
ABDOMINAL PAIN: 0
DOUBLE VISION: 0
SHORTNESS OF BREATH: 0
SLEEP QUALITY: ADEQUATE
SORE THROAT: 0
SEIZURES: 0
NAUSEA: 0
SENSORY CHANGE: 0
COUGH: 0
GASTROINTESTINAL NEGATIVE: 1
NAUSEA: 0
SORE THROAT: 0
SORE THROAT: 0
TINGLING: 0
SHORTNESS OF BREATH: 0
ABDOMINAL PAIN: 0
BLOOD IN STOOL: 0
HEMOPTYSIS: 0
FEVER: 0
SHORTNESS OF BREATH: 0
VOMITING: 0
COUGH: 0
BLURRED VISION: 0
PHOTOPHOBIA: 0
SORE THROAT: 0
HEARTBURN: 0
NAUSEA: 0
ABDOMINAL PAIN: 0
SHORTNESS OF BREATH: 0
FALLS: 0
SHORTNESS OF BREATH: 0
DOUBLE VISION: 0
BRUISES/BLEEDS EASILY: 1
WEAKNESS: 1
TINGLING: 0
EYES NEGATIVE: 1
WHEEZING: 0
SHORTNESS OF BREATH: 0
DEPRESSION: 0
DIZZINESS: 1
SHORTNESS OF BREATH: 0
CHILLS: 0
NAUSEA: 0
WHEEZING: 0
PHOTOPHOBIA: 0
WHEEZING: 0
WEAKNESS: 1
RESPIRATORY NEGATIVE: 1
HEARTBURN: 0
MYALGIAS: 0
BLOOD IN STOOL: 0
PALPITATIONS: 0
HEADACHES: 0
NERVOUS/ANXIOUS: 0
HEARTBURN: 0
VOMITING: 0
INSOMNIA: 0
CLAUDICATION: 0
WHEEZING: 0
BRUISES/BLEEDS EASILY: 1
WEAKNESS: 1
SORE THROAT: 0
FOCAL WEAKNESS: 0
CHILLS: 0
NERVOUS/ANXIOUS: 0
FOCAL WEAKNESS: 0
FEVER: 0
CHANGE IN LEVEL OF CONSCIOUSNESS: 1
DYSPNEA ON EXERTION: 1
SHORTNESS OF BREATH: 0
COUGH: 0
TINGLING: 0
BLOOD IN STOOL: 0
HEADACHES: 0
SHORTNESS OF BREATH: 0
PALPITATIONS: 0
VOMITING: 0
HEADACHES: 0
DIZZINESS: 1
FALLS: 0
DIZZINESS: 0
NAUSEA: 0
TREMORS: 0
BRUISES/BLEEDS EASILY: 1
SHORTNESS OF BREATH: 0
BLURRED VISION: 0
BACK PAIN: 0
SORE THROAT: 0
WHEEZING: 0
DESCRIPTION OF MEMORY LOSS: SHORT TERM
SEIZURES: 0
NECK PAIN: 0
NERVOUS/ANXIOUS: 0
SPUTUM PRODUCTION: 0
CHILLS: 0
NAUSEA: 0
HEADACHES: 0
SENSORY CHANGE: 0
DOUBLE VISION: 0
COUGH: 0
TREMORS: 1
BACK PAIN: 0
MYALGIAS: 0
BLOOD IN STOOL: 0
FALLS: 0
VOMITING: 0
BACK PAIN: 0
HEADACHES: 0
STRIDOR: 0
CHILLS: 0
NAUSEA: 0
EYES NEGATIVE: 1
BACK PAIN: 0
COUGH: 0
FEVER: 0
WEAKNESS: 1
TREMORS: 0
HEADACHES: 0
CHILLS: 0
VOMITING: 0
COUGH: 0
SEIZURES: 0
DEPRESSION: 0
WHEEZING: 0
NAUSEA: 0
PALPITATIONS: 0
MYALGIAS: 0
PALPITATIONS: 0
COUGH: 0
CHILLS: 0
COUGH: 0
HALLUCINATIONS: 0
HEMOPTYSIS: 0
DOUBLE VISION: 0
SHORTNESS OF BREATH: 0
DOUBLE VISION: 0
PALPITATIONS: 0
SPUTUM PRODUCTION: 0
COUGH: 0
CHILLS: 1
CARDIOVASCULAR NEGATIVE: 1
DOUBLE VISION: 0
NAUSEA: 0
BLURRED VISION: 0
ABDOMINAL PAIN: 0
ABDOMINAL PAIN: 0
VOMITING: 0
PHOTOPHOBIA: 0
ORTHOPNEA: 0
PALPITATIONS: 0
WEAKNESS: 1
ABDOMINAL PAIN: 0
CHILLS: 0
FLANK PAIN: 0
MYALGIAS: 0
NAUSEA: 0
ORTHOPNEA: 0
FOCAL WEAKNESS: 0
DOUBLE VISION: 0
FEVER: 0
ORTHOPNEA: 0
DIZZINESS: 0
SORE THROAT: 0
STOOL FREQUENCY: MORE THAN TWICE DAILY
NECK PAIN: 0
BACK PAIN: 0
SORE THROAT: 0
CHILLS: 0
SEIZURES: 0
BOWEL INCONTINENCE: 1
EYE DISCHARGE: 0
SKIN LESIONS: 1
BACK PAIN: 0
BACK PAIN: 0
STRIDOR: 0
BACK PAIN: 0
HEMOPTYSIS: 0
CHILLS: 0
MYALGIAS: 0
HEARTBURN: 0
FOCAL WEAKNESS: 0
BACK PAIN: 1
PALPITATIONS: 0
DIAPHORESIS: 0
SHORTNESS OF BREATH: 0
ROS GI COMMENTS: BM 6/24
FEVER: 0
DOUBLE VISION: 0
WHEEZING: 0
NAUSEA: 0
DEPRESSION: 0
SPUTUM PRODUCTION: 0
CHILLS: 0
SPEECH CHANGE: 0
DEPRESSION: 0
FOCAL WEAKNESS: 0
DEPRESSION: 0
ABDOMINAL PAIN: 0
TREMORS: 0
CHILLS: 0
NECK PAIN: 0
NAUSEA: 0
FLANK PAIN: 0
HEADACHES: 0
NECK PAIN: 0
VOMITING: 0
ABDOMINAL PAIN: 1
HALLUCINATIONS: 0
FLANK PAIN: 0
SHORTNESS OF BREATH: 0
BLURRED VISION: 0
FOCAL WEAKNESS: 0
FEVER: 0
HEADACHES: 0
VOMITING: 0
FEVER: 0
SENSORY CHANGE: 0
DEPRESSION: 0
TINGLING: 0
HEADACHES: 0
DOUBLE VISION: 0
WEAKNESS: 1
EYES NEGATIVE: 1
DIZZINESS: 0
DOUBLE VISION: 0
PALPITATIONS: 0
FALLS: 0
NERVOUS/ANXIOUS: 0
DIARRHEA: 0
BLURRED VISION: 0
VOMITING: 0
WEAKNESS: 1
NECK PAIN: 0
MYALGIAS: 0
COUGH: 0
TREMORS: 0
FALLS: 0
TINGLING: 0
CHILLS: 0
VOMITING: 0
TINGLING: 1
FEVER: 0
VOMITING: 0
HEADACHES: 0
PALPITATIONS: 0
DEPRESSION: 0
COUGH: 0
DOUBLE VISION: 0
DOUBLE VISION: 0
BACK PAIN: 0
ABDOMINAL PAIN: 0
WEAKNESS: 1
PALPITATIONS: 0
NAUSEA: 0
SEIZURES: 0
FEVER: 0
HEADACHES: 0
BLURRED VISION: 0
TREMORS: 0
DIAPHORESIS: 0
ORTHOPNEA: 0
FEVER: 0
SEIZURES: 0
PALPITATIONS: 0
STRIDOR: 0
FOCAL WEAKNESS: 0
SEIZURES: 0
COUGH: 0
SENSORY CHANGE: 0
COUGH: 0
BOWEL INCONTINENCE: 1
DIZZINESS: 1
COUGH: 0
FALLS: 0
DEPRESSION: 0
FEVER: 0
VOMITING: 0
FEVER: 0
CONSTIPATION: 0
CLAUDICATION: 0
EYES NEGATIVE: 1
FLANK PAIN: 0
PALPITATIONS: 0
BLURRED VISION: 0
CLAUDICATION: 0
CONSTIPATION: 1
NEUROLOGICAL NEGATIVE: 1
PHOTOPHOBIA: 0
FEVER: 0
SHORTNESS OF BREATH: 0
SORE THROAT: 0
WHEEZING: 0
DIZZINESS: 0
TINGLING: 0
FOCAL WEAKNESS: 0
CHANGE IN LEVEL OF CONSCIOUSNESS: 1
CHILLS: 0
HEADACHES: 0
TINGLING: 0
TREMORS: 0
BRUISES/BLEEDS EASILY: 1
HEMOPTYSIS: 0
ABDOMINAL PAIN: 0
COUGH: 0
TINGLING: 0
WHEEZING: 0
SHORTNESS OF BREATH: 0
PALPITATIONS: 0
TINGLING: 0
CLAUDICATION: 0
ABDOMINAL PAIN: 0
DESCRIPTION OF MEMORY LOSS: LONG TERM
HEMOPTYSIS: 0
NECK PAIN: 0
WEAKNESS: 1
HEMOPTYSIS: 0
FALLS: 0
NECK PAIN: 0
CLAUDICATION: 0
COUGH: 0
COUGH: 0
PALPITATIONS: 0
CHILLS: 0
WEAKNESS: 1
LAST BOWEL MOVEMENT: 64485
VOMITING: 0
BACK PAIN: 0
BLURRED VISION: 0
HEMOPTYSIS: 0
CHILLS: 0
PALPITATIONS: 0
PALPITATIONS: 0
ABDOMINAL PAIN: 0
EYE DISCHARGE: 0
ABDOMINAL PAIN: 0
NECK PAIN: 0
CHILLS: 0
MYALGIAS: 0
NAUSEA: 0
WHEEZING: 0
DIARRHEA: 1
NAUSEA: 0
ORTHOPNEA: 0
WEIGHT LOSS: 0
SHORTNESS OF BREATH: 1
EYES NEGATIVE: 1
SHORTNESS OF BREATH: 0
FEVER: 0
FEVER: 0
WEAKNESS: 1
HEADACHES: 0
FOCAL WEAKNESS: 0
WEAKNESS: 1
WEIGHT LOSS: 0
DEPRESSION: 0
FOCAL WEAKNESS: 0
SKIN LESIONS: 1
NAUSEA: 0
BACK PAIN: 0
DIZZINESS: 1
ABDOMINAL PAIN: 0
STRIDOR: 0
ABDOMINAL PAIN: 0
DEPRESSION: 0
TREMORS: 0
FEVER: 0
FOCAL WEAKNESS: 0
WHEEZING: 0
CHILLS: 0
HALLUCINATIONS: 0
MYALGIAS: 0
COUGH: 0
PALPITATIONS: 0
NAUSEA: 0
NAUSEA: 0
BLURRED VISION: 0
BLURRED VISION: 0
CHILLS: 0
PALPITATIONS: 0
DEPRESSION: 0
FEVER: 0
STRIDOR: 0
DEPRESSION: 0
NAUSEA: 0
WEAKNESS: 1
FEVER: 0
FATIGUES EASILY: 1
BACK PAIN: 0
FOCAL WEAKNESS: 0
SORE THROAT: 0
SORE THROAT: 0
CHILLS: 0
HEADACHES: 0
FOCAL WEAKNESS: 0
COUGH: 0
BLOOD IN STOOL: 0
SENSORY CHANGE: 0
DOUBLE VISION: 0
PSYCHIATRIC NEGATIVE: 1
VOMITING: 0
COUGH: 0
EYE REDNESS: 0
PALPITATIONS: 0
VOMITING: 0
BLURRED VISION: 0
BRUISES/BLEEDS EASILY: 1
DIZZINESS: 0
PHOTOPHOBIA: 0
HALLUCINATIONS: 0
HEADACHES: 0
DEPRESSION: 0
HEADACHES: 0
HEARTBURN: 0
NAUSEA: 0
WEIGHT LOSS: 0
FEVER: 0
BLURRED VISION: 0
HEADACHES: 0
ORTHOPNEA: 0
HALLUCINATIONS: 0
ABDOMINAL PAIN: 0
VOMITING: 0
ORTHOPNEA: 0
VOMITING: 0
WEAKNESS: 1
VOMITING: 0
SEIZURES: 0
ABDOMINAL PAIN: 0
CHILLS: 0
COUGH: 0
CLAUDICATION: 0
CHILLS: 0
ABDOMINAL PAIN: 1
PALPITATIONS: 0
SHORTNESS OF BREATH: 0
BLURRED VISION: 0
HEADACHES: 0
SHORTNESS OF BREATH: 0
FATIGUE: 1
WEIGHT LOSS: 0
SHORTNESS OF BREATH: 0
EYE DISCHARGE: 0
ABDOMINAL PAIN: 0
VOMITING: 0
BLOOD IN STOOL: 0
MYALGIAS: 0
SORE THROAT: 0
SPUTUM PRODUCTION: 0
SORE THROAT: 0
SORE THROAT: 0
BLOOD IN STOOL: 0
FOCAL WEAKNESS: 0
BLURRED VISION: 0
NAUSEA: 0
ABDOMINAL PAIN: 0
BACK PAIN: 0
FEVER: 0
PALPITATIONS: 0
HEARTBURN: 0
TREMORS: 1
NAUSEA: 1
EYES NEGATIVE: 1
HEADACHES: 0
MYALGIAS: 0
EYES NEGATIVE: 1

## 2017-01-01 ASSESSMENT — PAIN SCALES - GENERAL
PAINLEVEL_OUTOF10: 0
PAINLEVEL_OUTOF10: 3
PAINLEVEL_OUTOF10: 0
PAINLEVEL_OUTOF10: 3
PAINLEVEL_OUTOF10: 0
PAINLEVEL_OUTOF10: 4
PAINLEVEL_OUTOF10: 4
PAINLEVEL_OUTOF10: 0
PAINLEVEL_OUTOF10: 2
PAINLEVEL_OUTOF10: 0
PAINLEVEL_OUTOF10: 5
PAINLEVEL_OUTOF10: 0

## 2017-01-01 ASSESSMENT — COGNITIVE AND FUNCTIONAL STATUS - GENERAL
TOILETING: TOTAL
TOILETING: TOTAL
EATING MEALS: A LITTLE
HELP NEEDED FOR BATHING: A LOT
DRESSING REGULAR LOWER BODY CLOTHING: TOTAL
DAILY ACTIVITIY SCORE: 11
MOVING TO AND FROM BED TO CHAIR: A LOT
SUGGESTED CMS G CODE MODIFIER MOBILITY: CM
DRESSING REGULAR UPPER BODY CLOTHING: A LOT
DRESSING REGULAR UPPER BODY CLOTHING: A LOT
TURNING FROM BACK TO SIDE WHILE IN FLAT BAD: A LOT
PERSONAL GROOMING: A LOT
SUGGESTED CMS G CODE MODIFIER DAILY ACTIVITY: CL
DRESSING REGULAR LOWER BODY CLOTHING: TOTAL
DAILY ACTIVITIY SCORE: 11
WALKING IN HOSPITAL ROOM: TOTAL
MOVING FROM LYING ON BACK TO SITTING ON SIDE OF FLAT BED: UNABLE
MOBILITY SCORE: 8
SUGGESTED CMS G CODE MODIFIER DAILY ACTIVITY: CL
CLIMB 3 TO 5 STEPS WITH RAILING: TOTAL
PERSONAL GROOMING: A LOT
EATING MEALS: A LITTLE
STANDING UP FROM CHAIR USING ARMS: TOTAL
HELP NEEDED FOR BATHING: A LOT

## 2017-01-01 ASSESSMENT — GAIT ASSESSMENTS: GAIT LEVEL OF ASSIST: UNABLE TO PARTICIPATE

## 2017-01-01 ASSESSMENT — ACTIVITIES OF DAILY LIVING (ADL)
CONTINENCE_REQUIRES_ASSISTANCE: 1
EATING_REQUIRES_ASSISTANCE: 1
DRESSING_REQUIRES_ASSISTANCE: 1
MONEY MANAGEMENT (EXPENSES/BILLS): TOTALLY DEPENDENT
TOILETING: INDEPENDENT
BATHING_REQUIRES_ASSISTANCE: 1

## 2017-01-01 ASSESSMENT — LIFESTYLE VARIABLES
HAVE YOU EVER FELT YOU SHOULD CUT DOWN ON YOUR DRINKING: YES
ALCOHOL_USE: YES
EVER_SMOKED: UNABLE TO EVALUATE AT THIS TIME - NEEDS ASSESSMENT PRIOR TO DISCHARGE

## 2017-06-21 PROBLEM — K81.0 ACUTE CHOLECYSTITIS: Status: ACTIVE | Noted: 2017-01-01

## 2017-06-21 PROBLEM — N17.9 ACUTE KIDNEY INJURY (HCC): Status: ACTIVE | Noted: 2017-01-01

## 2017-06-21 PROBLEM — E80.6 HYPERBILIRUBINEMIA: Status: ACTIVE | Noted: 2017-01-01

## 2017-06-21 PROBLEM — D69.6 THROMBOCYTOPENIA (HCC): Status: ACTIVE | Noted: 2017-01-01

## 2017-06-21 PROBLEM — K70.31 ALCOHOLIC CIRRHOSIS OF LIVER WITH ASCITES (HCC): Status: ACTIVE | Noted: 2017-01-01

## 2017-06-21 PROBLEM — K70.10 ALCOHOLIC HEPATITIS: Status: ACTIVE | Noted: 2017-01-01

## 2017-06-21 PROBLEM — R10.9 ABDOMINAL PAIN: Status: ACTIVE | Noted: 2017-01-01

## 2017-06-21 PROBLEM — N39.0 URINARY TRACT INFECTION: Status: ACTIVE | Noted: 2017-01-01

## 2017-06-21 PROBLEM — K83.09 ASCENDING CHOLANGITIS: Status: ACTIVE | Noted: 2017-01-01

## 2017-06-21 PROBLEM — S30.810A EXCORIATION OF BUTTOCK: Status: ACTIVE | Noted: 2017-01-01

## 2017-06-21 NOTE — ED PROVIDER NOTES
"ED Provider Note    Scribed for Kelby Lieberman D.O. by Jovanny López. 6/21/2017  6:01 AM    Primary care provider: None noted  Means of arrival: EMS  History obtained from: Patient      CHIEF COMPLAINT  Chief Complaint   Patient presents with   • Jaundice   • Abdominal Pain   • Possible Stroke       HPI  Antonia Arrington is a 82 y.o. female who presents to the Emergency Department complaining of ongoing abdominal pain onset \"a while ago.\" Patient does not report any alleviating or exacerbating factors to the abdominal pain. She also presents with a left-sided lip droop, that she states may or may not be new.  She denies history of stroke. Patient denies pain anywhere else. She does not report any associated nausea, vomiting, diarrhea, chills, chest pain, or shortness of breath. Patient lives with her .    REVIEW OF SYSTEMS  Review of Systems   Constitutional: Negative for chills.   Respiratory: Negative for shortness of breath.    Cardiovascular: Negative for chest pain.   Gastrointestinal: Positive for abdominal pain. Negative for nausea, vomiting and diarrhea.   Neurological:        Positive left-sided lip droop   All other systems reviewed and are negative.  C    PAST MEDICAL HISTORY   None noted    SURGICAL HISTORY  patient denies any surgical history    SOCIAL HISTORY  Social History   Substance Use Topics   • Smoking status: Never Smoker    • Smokeless tobacco: None   • Alcohol Use: Yes      Comment: Former drinker      History   Drug Use No       FAMILY HISTORY  History reviewed. No pertinent family history.    CURRENT MEDICATIONS  Home Medications     Reviewed by Luna Rdz (Pharmacy Tech) on 06/21/17 at 0842  Med List Status: Complete    Medication Last Dose Status    B Complex Vitamins (B COMPLEX PO) > 1 week Active    CALCIUM PO > 1 week Active    ibuprofen (MOTRIN) 200 MG Tab > 1 week Active                ALLERGIES  No Known Allergies    PHYSICAL EXAM  VITAL SIGNS: /63 mmHg " " Pulse 89  Temp(Src) 36.3 °C (97.4 °F)  Resp 18  Ht 1.6 m (5' 3\")  Wt 61.236 kg (135 lb)  BMI 23.92 kg/m2  Constitutional: Ill-appearing, elderly mild acute distress.  HEENT: Normocephalic, atraumatic. Posterior pharynx dry mm.  Eyes:  EOMI. Normal sclera.  Neck: Supple, Full range of motion, nontender.  Chest/Pulmonary:  Diminished breath sounds.   Equal expansion.  Cardio: Regular rate and rhythm with no murmur.   Abdomen: Soft, nontender. No peritoneal signs. No guarding. No palpable masses.  Back: No CVA tenderness, nontender midline, no step offs.  Musculoskeletal: No deformity, no edema, neurovascular intact.   Neuro: Slurred speech, appropriate, cooperative. Left-sided facial droop. 3/5  on right 2/5  left.  Cornejo x 4.  Psych:  Flat affect.     DIAGNOSTIC STUDIES / PROCEDURES    LABS  Results for orders placed or performed during the hospital encounter of 06/21/17   Lactic acid (lactate)   Result Value Ref Range    Lactic Acid 2.8 (H) 0.5 - 2.0 mmol/L   CBC WITH DIFFERENTIAL   Result Value Ref Range    WBC 9.9 4.8 - 10.8 K/uL    RBC 3.43 (L) 4.20 - 5.40 M/uL    Hemoglobin 11.4 (L) 12.0 - 16.0 g/dL    Hematocrit 36.5 (L) 37.0 - 47.0 %    .4 (H) 81.4 - 97.8 fL    MCH 33.2 (H) 27.0 - 33.0 pg    MCHC 31.2 (L) 33.6 - 35.0 g/dL    RDW 68.4 (H) 35.9 - 50.0 fL    Platelet Count 92 (L) 164 - 446 K/uL    MPV 10.9 9.0 - 12.9 fL    Neutrophils-Polys 71.40 44.00 - 72.00 %    Lymphocytes 9.50 (L) 22.00 - 41.00 %    Monocytes 15.70 (H) 0.00 - 13.40 %    Eosinophils 2.60 0.00 - 6.90 %    Basophils 0.40 0.00 - 1.80 %    Immature Granulocytes 0.40 0.00 - 0.90 %    Nucleated RBC 0.00 /100 WBC    Lymphs (Absolute) 0.94 (L) 1.00 - 4.80 K/uL    Monos (Absolute) 1.56 (H) 0.00 - 0.85 K/uL    Eos (Absolute) 0.26 0.00 - 0.51 K/uL    Baso (Absolute) 0.04 0.00 - 0.12 K/uL    Immature Granulocytes (abs) 0.04 0.00 - 0.11 K/uL    NRBC (Absolute) 0.00 K/uL    Neutrophils (Absolute) 7.08 2.00 - 7.15 K/uL    Anisocytosis 2+ "     Macrocytosis 2+    COMP METABOLIC PANEL   Result Value Ref Range    Sodium 136 135 - 145 mmol/L    Potassium 4.0 3.6 - 5.5 mmol/L    Chloride 103 96 - 112 mmol/L    Co2 27 20 - 33 mmol/L    Anion Gap 6.0 0.0 - 11.9    Glucose 96 65 - 99 mg/dL    Bun 84 (HH) 8 - 22 mg/dL    Creatinine 1.31 0.50 - 1.40 mg/dL    Calcium 8.9 8.5 - 10.5 mg/dL    AST(SGOT) 155 (H) 12 - 45 U/L    ALT(SGPT) 153 (H) 2 - 50 U/L    Alkaline Phosphatase 129 (H) 30 - 99 U/L    Total Bilirubin 10.2 (H) 0.1 - 1.5 mg/dL    Albumin 2.1 (L) 3.2 - 4.9 g/dL    Total Protein 8.6 (H) 6.0 - 8.2 g/dL    Globulin 6.5 (H) 1.9 - 3.5 g/dL    A-G Ratio 0.3 g/dL   URINALYSIS   Result Value Ref Range    Micro Urine Req Microscopic     Color Dk. Yellow     Character Cloudy (A)     Specific Gravity 1.016 <1.035    Ph 7.5 5.0-8.0    Glucose Negative Negative mg/dL    Ketones Negative Negative mg/dL    Protein 50 (A) Negative mg/dL    Nitrite Negative Negative    Leukocyte Esterase Large (A) Negative    Occult Blood Moderate (A) Negative   TROPONIN   Result Value Ref Range    Troponin I 0.07 (H) 0.00 - 0.04 ng/mL   BILIRUBIN DIRECT   Result Value Ref Range    Direct Bilirubin 5.9 (H) 0.1 - 0.5 mg/dL   AMMONIA   Result Value Ref Range    Ammonia 52 (H) 11 - 45 umol/L   BILIRUBIN INDIRECT   Result Value Ref Range    Indirect Bilirubin 4.3 (H) 0.0 - 1.0 mg/dL   ESTIMATED GFR   Result Value Ref Range    GFR If  47 (A) >60 mL/min/1.73 m 2    GFR If Non  39 (A) >60 mL/min/1.73 m 2   PERIPHERAL SMEAR REVIEW   Result Value Ref Range    Peripheral Smear Review see below    PLATELET ESTIMATE   Result Value Ref Range    Plt Estimation Decreased    MORPHOLOGY   Result Value Ref Range    RBC Morphology Present    DIFFERENTIAL COMMENT   Result Value Ref Range    Comments-Diff see below    UR BILI ICTOTEST   Result Value Ref Range    Bilirubin Negative Negative   URINE MICROSCOPIC (W/UA)   Result Value Ref Range    WBC Packed (A) /hpf    RBC 5-10  (A) /hpf    Bacteria Moderate (A) None /hpf    Amorphous Crystal Present /hpf    Epithelial Cells Rare /hpf   EKG (NOW)   Result Value Ref Range    Report       [facility] Emergency Dept.    Test Date:  2017  Pt Name:    VERA LENNON                  Department: ER  MRN:        [id number]                      Room:       RD 12  Gender:     F                            Technician: [id number]  :        [id number]                   Requested By:ESTHELA CARROLL  Order #:    [id number]                    Reading MD:    Measurements  Intervals                                Axis  Rate:       79                           P:          -39  OR:         144                          QRS:        -9  QRSD:       94                           T:          35  QT:         416  QTc:        477    Interpretive Statements  SINUS RHYTHM  PROBABLE LVH WITH SECONDARY REPOL ABNRM  No previous ECG available for comparison        All labs reviewed by me.    RADIOLOGY  CT-HEAD W/O   Final Result      1.  Diffuse atrophy and white matter changes.   2.  No acute intracranial hemorrhage or territorial infarct.   3.  Chronic RIGHT temporal encephalomalacia with compensatory dilation of temporal horn RIGHT lateral ventricle.      DX-CHEST-PORTABLE (1 VIEW)   Final Result      1.  Mild cardiomegaly.   2.  No pneumonia or pulmonary edema.      US-GALLBLADDER    (Results Pending)     The radiologist's interpretation of all radiological studies have been reviewed by me.    COURSE & MEDICAL DECISION MAKING  Pertinent Labs & Imaging studies reviewed. (See chart for details)    6:01 AM - Patient seen and examined at bedside. Ordered CT head, DX chest, lactate, estimated GFR, peripheral smear review, platelet estimate, morphology, differential comment, lactate, CBC, CMP, urinalysis, urine culture, blood culture, troponin, bilirubin direct, ammonia, bilirubin indirect, EKG to evaluate her symptoms. The differential diagnoses include  but are not limited to: sepsis, mi, pneumonia, cva.    CRITICAL CARE  The very real possibility of a deterioration of this patient's condition required the highest level of my preparedness for sudden, emergent intervention.  I provided critical care services, which included medication orders, frequent reevaluations of the patient's condition and response to treatment, ordering and reviewing test results, and discussing the case with various consultants.  The critical care time associated with the care of the patient was 35 minutes. Review chart for interventions. This time is exclusive of any other billable procedures.       8:10 AM Paged [facility]    8:57 AM  [facility] will admit the pt in critical condition.     FINAL IMPRESSION  1. Sepsis, due to unspecified organism (CMS-[facility])    2.      Critical care time 35 minutes.      Jovanny ROLAND (Scribe), am scribing for, and in the presence of, Kelby Lieberman D.O..    Electronically signed by: Jovanny López (Nanci), 6/21/2017    Kelby ROLAND D.O. personally performed the services described in this documentation, as scribed by Jovanny López in my presence, and it is both accurate and complete.    The note accurately reflects work and decisions made by me.  Kelby Lieberman  6/21/2017  9:00 AM

## 2017-06-21 NOTE — IP AVS SNAPSHOT
UrbanTakeover Access Code: [email or web address]  Expires: 8/13/2017 11:19 AM    Your email address is not on file at [facility].  Email Addresses are required for you to sign up for UrbanTakeover, please contact [phone] to verify your personal information and to provide your email address prior to attempting to register for UrbanTakeover.    Abigail Bradley  [address] [place] [address], [place] [address]    UrbanTakeover  A secure, online tool to manage your health information     [facility]’s UrbanTakeover® is a secure, online tool that connects you to your personalized health information from the privacy of your home -- day or night - making it very easy for you to manage your healthcare. Once the activation process is completed, you can even access your medical information using the UrbanTakeover shiv, which is available for free in the Apple Shiv store or Google Play store.     To learn more about UrbanTakeover, visit [email or web address]    There are two levels of access available (as shown below):   My Chart Features  [facility] Primary Care Doctor [facility]  Specialists [facility]  Urgent  Care Non-[facility] Primary Care Doctor   Email your healthcare team securely and privately 24/7 X X X    Manage appointments: schedule your next appointment; view details of past/upcoming appointments X      Request prescription refills. X      View recent personal medical records, including lab and immunizations X X X X   View health record, including health history, allergies, medications X X X X   Read reports about your outpatient visits, procedures, consult and ER notes X X X X   See your discharge summary, which is a recap of your hospital and/or ER visit that includes your diagnosis, lab results, and care plan X X  X     How to register for [employer]:  Once your e-mail address has been verified, follow the following steps to sign up for UrbanTakeover.     1. Go to  [email or web address]  2. Click on the Sign Up Now box, which takes you to the New Member Sign Up page. You will  need to provide the following information:  a. Enter your ALENTY Access Code exactly as it appears at the top of this page. (You will not need to use this code after you’ve completed the sign-up process. If you do not sign up before the expiration date, you must request a new code.)   b. Enter your date of birth.   c. Enter your home email address.   d. Click Submit, and follow the next screen’s instructions.  3. Create a EngagementHealtht ID. This will be your ALENTY login ID and cannot be changed, so think of one that is secure and easy to remember.  4. Create a ALENTY password. You can change your password at any time.  5. Enter your Password Reset Question and Answer. This can be used at a later time if you forget your password.   6. Enter your e-mail address. This allows you to receive e-mail notifications when new information is available in ALENTY.  7. Click Sign Up. You can now view your health information.    For assistance activating your ALENTY account, call [phone]

## 2017-06-21 NOTE — PROGRESS NOTES
Pt failed swallow evaluation due to facial weakness and alertness. S/W Dr Diaz who ordered changes to medications and swallow eval.

## 2017-06-21 NOTE — IP AVS SNAPSHOT
7/14/2017    Abigail Bradley  [address]  [place] [address]    Dear Abigail:    Critical access hospital wants to ensure your discharge home is safe and you or your loved ones have had all of your questions answered regarding your care after you leave the hospital.    Below is a list of resources and contact information should you have any questions regarding your hospital stay, follow-up instructions, or active medical symptoms.    Questions or Concerns Regarding… Contact   Medical Questions Related to Your Discharge  (7 days a week, 8am-5pm) Contact a Nurse Care Coordinator   [phone]   Medical Questions Not Related to Your Discharge  (24 hours a day / 7 days a week)  Contact the Nurse Health Line   [phone]    Medications or Discharge Instructions Refer to your discharge packet   or contact your [facility] Primary Care Provider   [phone]   Follow-up Appointment(s) Schedule your appointment via [employer]   or contact Scheduling [phone]   Billing Review your statement via [employer]  or contact Billing [phone]   Medical Records Review your records via [employer]   or contact Medical Records [phone]     You may receive a telephone call within two days of discharge. This call is to make certain you understand your discharge instructions and have the opportunity to have any questions answered. You can also easily access your medical information, test results and upcoming appointments via the [employer]anto free online health management tool. You can learn more and sign up at [employer]/Cyanto. For assistance setting up your [employer]anto account, please call [phone].    Once again, we want to ensure your discharge home is safe and that you have a clear understanding of any next steps in your care. If you have any questions or concerns, please do not hesitate to contact us, we are here for you. Thank you for choosing [facility] for your healthcare needs.    Sincerely,    Your [facility] Healthcare Team

## 2017-06-21 NOTE — ED NOTES
.  Chief Complaint   Patient presents with   • Jaundice   • Abdominal Pain   • Possible Stroke     BIB EMS from [facility]. Patient experiencing ABD pain, distention, and increasing weakness for one month.  Patient jaundice, with multiple skin wounds across bilateral knees, draining.  Coccyx area excoriated, draining.  Bruising/blood accumulation in bilateral kidney area.  Patient extremely weak.  Alert, oriented to situation, place and self. Left sided facial droop noted, left sided weakness noted.     Formally heavy drinker, quit drinking one month ago.      Patient lives with  at home who is caretaker.

## 2017-06-21 NOTE — IP AVS SNAPSHOT
" Home Care Instructions                                                                                                                  Name:Abigail Bradley  Medical Record Number:[id number]  CSN: [id number]    YOB: 1935   Age: 82 y.o.  Sex: female  HT:1.6 m (5' 3\") WT: 66.2 kg (145 lb 15.1 oz)          Admit Date: 6/21/2017     Discharge Date:   Today's Date: 7/14/2017  Attending Doctor:  Yasir Crain M.D.                  Allergies:  Review of patient's allergies indicates no active allergies.            Discharge Instructions       Discharge Instructions    Discharged to home by ambulance with escort. Discharged via ambulance, hospital escort: Yes.  Special equipment needed: العراقي and bowel management system    Be sure to schedule a follow-up appointment with your primary care doctor or any specialists as instructed.     Discharge Plan:   Diet Plan: Discussed  Activity Level: Discussed  Confirmed Follow up Appointment: No (Comments)  Confirmed Symptoms Management: Discussed  Medication Reconciliation Updated: Yes  Pneumococcal Vaccine Given - only chart on this line when given: Given (See MAR)  Influenza Vaccine Indication: Indicated: Not available from distributor/    I understand that a diet low in cholesterol, fat, and sodium is recommended for good health. Unless I have been given specific instructions below for another diet, I accept this instruction as my diet prescription.   Other diet: Regular    Special Instructions: None    · Is patient discharged on Warfarin / Coumadin?   No     · Is patient Post Blood Transfusion?  No    Depression / Suicide Risk    As [facility] are [facility], it is important to learn how to keep safe from harming yourself.    Recognize the warning signs:  · Abrupt changes in personality, positive or negative- including increase in energy   · Giving away possessions  · Change in eating patterns- significant weight changes-  positive or " negative  · Change in sleeping patterns- unable to sleep or sleeping all the time   · Unwillingness or inability to communicate  · Depression  · Unusual sadness, discouragement and loneliness  · Talk of wanting to die  · Neglect of personal appearance   · Rebelliousness- reckless behavior  · Withdrawal from people/activities they love  · Confusion- inability to concentrate     If you or a loved one observes any of these behaviors or has concerns about self-harm, here's what you can do:  · Talk about it- your feelings and reasons for harming yourself  · Remove any means that you might use to hurt yourself (examples: pills, rope, extension cords, firearm)  · Get professional help from the community (Mental Health, Substance Abuse, psychological counseling)  · Do not be alone:Call your Safe Contact- someone whom you trust who will be there for you.  · Call your local CRISIS HOTLINE 599-6278 or 343-769-7209  · Call your local Children's Mobile Crisis Response Team [place] (103) 433-8035 or wwwClearServe  · Call the toll free National Suicide Prevention Hotlines   · National Suicide Prevention Lifeline 552-309-GSWX (3289)  · National Hope Line Network 800-SUICIDE (747-4676)    Morphine oral solution  What is this medicine?  MORPHINE (MOR feen) is a pain reliever. It is used to treat moderate to severe pain.  This medicine may be used for other purposes; ask your health care provider or pharmacist if you have questions.  COMMON BRAND NAME(S): MSIR, Roxanol, Roxanol-T  What should I tell my health care provider before I take this medicine?  They need to know if you have any of these conditions:  -brain tumor  -drug abuse or addiction  -head injury  -heart disease  -if you frequently drink alcohol-containing drinks  -intestinal disease  -kidney disease or problems urinating  -kyphoscoliosis  -liver disease  -lung disease, asthma, or breathing problems  -taken isocarboxazid, phenelzine, tranylcypromine, or  selegiline in the past 2 weeks  -seizures  -an unusual or allergic reaction to morphine, other medicines, foods, dyes, or preservatives  -pregnant or trying to get pregnant  -breast-feeding  How should I use this medicine?  Take this medicine by mouth. Follow the directions on the prescription label. Use a specially marked spoon or container to measure each dose. Ask your pharmacist if you do not have one. Household spoons are not accurate. If the medicine upsets your stomach, take it with food or milk. Take your medicine at regular intervals. Do not take it more often than directed. Do not stop taking except on your doctor's advice.  A special MedGuide will be given to you by the pharmacist with each prescription and refill. Be sure to read this information carefully each time.  Talk to your pediatrician regarding the use of this medicine in children. Special care may be needed.  Overdosage: If you think you have taken too much of this medicine contact a poison control center or emergency room at once.  NOTE: This medicine is only for you. Do not share this medicine with others.  What if I miss a dose?  If you miss a dose, take it as soon as you can. If it is almost time for your next dose, take only that dose. Do not take double or extra doses.  What may interact with this medicine?  Do not take this medicine with any of the following medications:  -MAOIs like Carbex, Eldepryl, Marplan, Nardil, and Parnate  This medicine may also interact with the following medications:  -alcohol  -antihistamines  -barbiturates, like phenobarbital  -medicines for depression, anxiety, or psychotic disturbances  -medicines for sleep  -muscle relaxants  -naltrexone, naloxone  -narcotic medicines (opiates) for pain  -rifampin  -tramadol  This list may not describe all possible interactions. Give your health care provider a list of all the medicines, herbs, non-prescription drugs, or dietary supplements you use. Also tell them if you  smoke, drink alcohol, or use illegal drugs. Some items may interact with your medicine.  What should I watch for while using this medicine?  Tell your doctor or health care professional if your pain does not go away, if it gets worse, or if you have new or a different type of pain. You may develop tolerance to the medicine. Tolerance means that you will need a higher dose of the medicine for pain relief. Tolerance is normal and is expected if you take this medicine for a long time.  Do not suddenly stop taking your medicine because you may develop a severe reaction. Your body becomes used to the medicine. This does NOT mean you are addicted. Addiction is a behavior related to getting and using a drug for a non-medical reason. If you have pain, you have a medical reason to take pain medicine. Your doctor will tell you how much medicine to take. If your doctor wants you to stop the medicine, the dose will be slowly lowered over time to avoid any side effects.  You may get drowsy or dizzy when you first start taking the medicine or change doses. Do not drive, use machinery, or do anything that may be dangerous until you know how the medicine affects you. Stand or sit up slowly.  There are different types of narcotic medicines (opiates) for pain. If you take more than one type at the same time, you may have more side effects. Give your health care provider a list of all medicines you use. Your doctor will tell you how much medicine to take. Do not take more medicine than directed. Call emergency for help if you have problems breathing.  This medicine will cause constipation. Try to have a bowel movement at least every 2 to 3 days. If you do not have a bowel movement for 3 days, call your doctor or health care professional.  Your mouth may get dry. Drinking water, chewing sugarless gum, or sucking on hard candy may help. See your dentist every 6 months.  What side effects may I notice from receiving this medicine?  Side  effects that you should report to your doctor or health care professional as soon as possible:  -allergic reactions like skin rash, itching or hives, swelling of the face, lips, or tongue  -breathing problems  -change in the amount of urine  -confusion  -fever, chills  -hallucinations  -feeling faint or lightheaded  -red or sore at the injection site  -seizures  -slow or fast heartbeat  Side effects that usually do not require medical attention (report to your doctor or health care professional if they continue or are bothersome):  -constipation  -dizzy, drowsy  -headache  -nausea, vomiting  -pinpoint pupils  -sweating  This list may not describe all possible side effects. Call your doctor for medical advice about side effects. You may report side effects to FDA at 3-547-FDA-9902.  Where should I keep my medicine?  Keep out of the reach of children. This medicine can be abused. Keep your medicine in a safe place to protect it from theft. Do not share this medicine with anyone. Selling or giving away this medicine is dangerous and is against the law.  Store at room temperature between 15 and 30 degrees C (59 and 86 degrees F). Keep container tightly closed.  Discard unused medicine and used packaging carefully. Pets and children can be harmed if they find used or lost packages. Flush any unused medicines down the toilet. Do not use the medicine after the expiration date. Follow the directions in the MedGuide.  NOTE: This sheet is a summary. It may not cover all possible information. If you have questions about this medicine, talk to your doctor, pharmacist, or health care provider.  © 2014, Elsevier/Gold Standard. (8/24/2012 1:04:40 PM)      Lorazepam tablets  What is this medicine?  LORAZEPAM (francisca A ze sheyla) is a benzodiazepine. It is used to treat anxiety.  This medicine may be used for other purposes; ask your health care provider or pharmacist if you have questions.  COMMON BRAND NAME(S): Ativan  What should I  tell my health care provider before I take this medicine?  They need to know if you have any of these conditions:  -alcohol or drug abuse problem  -bipolar disorder, depression, psychosis or other mental health condition  -glaucoma  -kidney or liver disease  -lung disease or breathing difficulties  -myasthenia gravis  -Parkinson's disease  -seizures or a history of seizures  -suicidal thoughts  -an unusual or allergic reaction to lorazepam, other benzodiazepines, foods, dyes, or preservatives  -pregnant or trying to get pregnant  -breast-feeding  How should I use this medicine?  Take this medicine by mouth with a glass of water. Follow the directions on the prescription label. If it upsets your stomach, take it with food or milk. Take your medicine at regular intervals. Do not take it more often than directed. Do not stop taking except on the advice of your doctor or health care professional.  Talk to your pediatrician regarding the use of this medicine in children. Special care may be needed.  Overdosage: If you think you have taken too much of this medicine contact a poison control center or emergency room at once.  NOTE: This medicine is only for you. Do not share this medicine with others.  What if I miss a dose?  If you miss a dose, take it as soon as you can. If it is almost time for your next dose, take only that dose. Do not take double or extra doses.  What may interact with this medicine?  -barbiturate medicines for inducing sleep or treating seizures, like phenobarbital  -clozapine  -medicines for depression, mental problems or psychiatric disturbances  -medicines for sleep  -phenytoin  -probenecid  -theophylline  -valproic acid  This list may not describe all possible interactions. Give your health care provider a list of all the medicines, herbs, non-prescription drugs, or dietary supplements you use. Also tell them if you smoke, drink alcohol, or use illegal drugs. Some items may interact with your  medicine.  What should I watch for while using this medicine?  Visit your doctor or health care professional for regular checks on your progress. Your body may become dependent on this medicine, ask your doctor or health care professional if you still need to take it. However, if you have been taking this medicine regularly for some time, do not suddenly stop taking it. You must gradually reduce the dose or you may get severe side effects. Ask your doctor or health care professional for advice before increasing or decreasing the dose. Even after you stop taking this medicine it can still affect your body for several days.  You may get drowsy or dizzy. Do not drive, use machinery, or do anything that needs mental alertness until you know how this medicine affects you. To reduce the risk of dizzy and fainting spells, do not stand or sit up quickly, especially if you are an older patient. Alcohol may increase dizziness and drowsiness. Avoid alcoholic drinks.  Do not treat yourself for coughs, colds or allergies without asking your doctor or health care professional for advice. Some ingredients can increase possible side effects.  What side effects may I notice from receiving this medicine?  Side effects that you should report to your doctor or health care professional as soon as possible:  -changes in vision  -confusion  -depression  -mood changes, excitability or aggressive behavior  -movement difficulty, staggering or jerky movements  -muscle cramps  -restlessness  -weakness or tiredness  Side effects that usually do not require medical attention (report to your doctor or health care professional if they continue or are bothersome):  -constipation or diarrhea  -difficulty sleeping, nightmares  -dizziness, drowsiness  -headache  -nausea, vomiting  This list may not describe all possible side effects. Call your doctor for medical advice about side effects. You may report side effects to FDA at 1-895-FDA-2625.  Where  should I keep my medicine?  Keep out of the reach of children. This medicine can be abused. Keep your medicine in a safe place to protect it from theft. Do not share this medicine with anyone. Selling or giving away this medicine is dangerous and against the law.  Store at room temperature between 20 and 25 degrees C (68 and 77 degrees F). Protect from light. Keep container tightly closed. Throw away any unused medicine after the expiration date.  NOTE: This sheet is a summary. It may not cover all possible information. If you have questions about this medicine, talk to your doctor, pharmacist, or health care provider.  © 2014, Elsevier/Gold Standard. (6/19/2009 2:58:20 PM)      Follow-up Information     1. Follow up with [facility] .    Specialty:  Hospice    Contact information    [address]  [place] [id number]           Discharge Medication Instructions:    Below are the medications your physician expects you to take upon discharge:    Review all your home medications and newly ordered medications with your doctor and/or pharmacist. Follow medication instructions as directed by your doctor and/or pharmacist.    Please keep your medication list with you and share with your physician.               Medication List      START taking these medications        Instructions    Morning Afternoon Evening Bedtime    lorazepam 2 MG/ML Conc   Commonly known as:  ATIVAN   Next Dose Due:  As needed for agitation          Take 0.5 mL by mouth every 8 hours as needed (for agitation).   Dose:  1 mg                        morphine 20 MG/ML Soln   Commonly known as:  ROXANOL   Next Dose Due:  As needed for pain        Take 0.5 mL by mouth every 6 hours as needed.   Dose:  10 mg                          STOP taking these medications     B COMPLEX PO               CALCIUM PO               ibuprofen 200 MG Tabs   Commonly known as:  MOTRIN                    Where to Get Your Medications      Information about where  to get these medications is not yet available     ! Ask your nurse or doctor about these medications    - lorazepam 2 MG/ML Conc  - morphine 20 MG/ML Soln            Orders for after discharge     REFERRAL TO HOSPICE    Complete by:  As directed              Instructions           Diet / Nutrition:    Follow any diet instructions given to you by your doctor or the dietician, including how much salt (sodium) you are allowed each day.    If you are overweight, talk to your doctor about a weight reduction plan.    Activity:    Remain physically active following your doctor's instructions about exercise and activity.    Rest often.     Any time you become even a little tired or short of breath, SIT DOWN and rest.    Worsening Symptoms:    Report any of the following signs and symptoms to the doctor's office immediately:    *Pain of jaw, arm, or neck  *Chest pain not relieved by medication                               *Dizziness or loss of consciousness  *Difficulty breathing even when at rest   *More tired than usual                                       *Bleeding drainage or swelling of surgical site  *Swelling of feet, ankles, legs or stomach                 *Fever (>100ºF)  *Pink or blood tinged sputum  *Weight gain (3lbs/day or 5lbs /week)           *Shock from internal defibrillator (if applicable)  *Palpitations or irregular heartbeats                *Cool and/or numb extremities    Stroke Awareness    Common Risk Factors for Stroke include:    Age  Atrial Fibrillation  Carotid Artery Stenosis  Diabetes Mellitus  Excessive alcohol consumption  High blood pressure  Overweight   Physical inactivity  Smoking    Warning signs and symptoms of a stroke include:    *Sudden numbness or weakness of the face, arm or leg (especially on one side of the body).  *Sudden confusion, trouble speaking or understanding.  *Sudden trouble seeing in one or both eyes.  *Sudden trouble walking, dizziness, loss of balance or  coordination.Sudden severe headache with no known cause.    It is very important to get treatment quickly when a stroke occurs. If you experience any of the above warning signs, call 911 immediately.                   Disclaimer         Quit Smoking / Tobacco Use:    I understand the use of any tobacco products increases my chance of suffering from future heart disease or stroke and could cause other illnesses which may shorten my life. Quitting the use of tobacco products is the single most important thing I can do to improve my health. For further information on smoking / tobacco cessation call a Toll Free Quit Line at 1-349.227.9438 (*[employer]) or 1-461.779.3433 (American Lung Association) or you can access the web based program at www.[email or web address].S. [place].org.    [place] Tobacco Users Help Line:  (390) 232-4025       Toll Free: 1-195.631.6008  Quit Tobacco Program [facility] (327)608-9303    Crisis Hotline:    [place] Crisis Hotline:  3-010-APFNISD or 1-478.991.3482    [place] Crisis Hotline:    1-446.123.3472 or 829-309-7594    Discharge Survey:   Thank you for choosing [facility]. We hope we did everything we could to make your hospital stay a pleasant one. You may be receiving a phone survey and we would appreciate your time and participation in answering the questions. Your input is very valuable to us in our efforts to improve our service to our patients and their families.        My signature on this form indicates that:    1. I have reviewed and understand the above information.  2. My questions regarding this information have been answered to my satisfaction.  3. I have formulated a plan with my discharge nurse to obtain my prescribed medications for home.                  Disclaimer         __________________________________                     __________       ________                       Patient Signature                                                 Date                     Time

## 2017-06-21 NOTE — ED NOTES
"Med rec updated and complete  Allergies reviewed  Pts  states \"No antibiotics in the last 30 days\".  Pts  states \"No prescription medications\".    "

## 2017-06-21 NOTE — IP AVS SNAPSHOT
" <p align=\"LEFT\"><IMG SRC=\"[email or web address]\" alt=\"Image\" WIDTH=\"50%\" HEIGHT=\"200\" BORDER=\"\"></p>                   Name:Abigail Bradley  Medical Record Number:[id number]  CSN: [id number]    YOB: 1935   Age: 82 y.o.  Sex: female  HT:1.6 m (5' 3\") WT: 66.2 kg (145 lb 15.1 oz)          Admit Date: 6/21/2017     Discharge Date:   Today's Date: 7/14/2017  Attending Doctor:  Yasir Crain M.D.                  Allergies:  Review of patient's allergies indicates no active allergies.          Follow-up Information     1. Follow up with [facility] .    Specialty:  Hospice    Contact information    [address]  [facility]  [place] [address]           Medication List      Take these Medications        Instructions    lorazepam 2 MG/ML Conc   Commonly known as:  ATIVAN    Take 0.5 mL by mouth every 8 hours as needed (for agitation).   Dose:  1 mg       morphine 20 MG/ML Soln   Commonly known as:  ROXANOL    Take 0.5 mL by mouth every 6 hours as needed.   Dose:  10 mg         "

## 2017-06-21 NOTE — H&P
"       [facility] Admitting History and Physical    Name Antonia Arrington       1935   Age/Sex 82 y.o. female   MRN [id number]   Code Status Full code     After 5PM or if no immediate response to page, please call for cross-coverage  Attending/Team: Dr. Hinson/Melchor Use Tiger Text to page 6AM-5PM  Call [phone] to page after hours   1st Call - Day Intern (R1):   Dr. Diaz 2nd Call - Day Sr. Resident (R2/R3):   Dr. Addison       Chief Complaint:  Abdominal pain, lethargy    HPI:  Ms. Arrington is a 82 year old female with no significant past medical history presents to the ED with complaints of abdominal pain, fatigue and lethargy.    She does note have a primary care physician and hasn't seen a physician in several years. She is currently not on any medication.    She has a history of alcohol abuse with her drinking almost daily.  reports her last drink to be one month ago and states that she was \"self medicating\" with alcohol due to non specified complaints. She reports having abdominal pain since about a month and states that last night at 3 AM it was severe enough where she wanted to get to the hospital. Patient is non-specific about location of pain reports to be diffuse, notes to have history of chills but no recorded temperature. She notes to have nausea but no vomiting. Has loss of appetite but reports eating intermittently and tolerates it. Last bowel movement is reportedly several days ago but is able to pass gas currently. Also reports to have some abdominal distension.    In the ED she was found to have an UTI and and was given a dose of zosyn for sepsis of unknown origin.      Review of Systems   Constitutional: Positive for chills. Negative for fever.   HENT: Negative for congestion.    Eyes: Negative for blurred vision and double vision.   Respiratory: Negative for cough and shortness of breath.    Cardiovascular: Negative for chest pain and palpitations.   Gastrointestinal: " Positive for nausea, abdominal pain and constipation. Negative for vomiting.   Genitourinary: Negative for dysuria and urgency.   Musculoskeletal: Positive for back pain and falls.   Skin: Negative for rash.        Has excoriations in buttocks   Neurological: Negative for dizziness, tingling, focal weakness and headaches.   Psychiatric/Behavioral: Negative for depression and suicidal ideas.             Past Medical History:   History reviewed. No pertinent past medical history.    Past Surgical History:  History reviewed. No pertinent past surgical history.    Current Outpatient Medications:  Home Medications     Reviewed by Luna Rdz (Pharmacy Tech) on 06/21/17 at 0842  Med List Status: Complete    Medication Last Dose Status    B Complex Vitamins (B COMPLEX PO) > 1 week Active    CALCIUM PO > 1 week Active    ibuprofen (MOTRIN) 200 MG Tab > 1 week Active                Medication Allergy/Sensitivities:  No Known Allergies      Family History:  Family History   Problem Relation Age of Onset   • Cancer Neg Hx    • Diabetes Neg Hx    • Cancer Neg Hx    • Heart Disease Mother        Social History:  Social History     Social History   • Marital Status: N/A     Spouse Name: N/A   • Number of Children: N/A   • Years of Education: N/A     Occupational History   • Not on file.     Social History Main Topics   • Smoking status: Never Smoker    • Smokeless tobacco: Not on file   • Alcohol Use: 0.0 oz/week     0 Standard drinks or equivalent per week      Comment: Former drinker. Last drink was a month ago. Used to drink daily.   • Drug Use: No   • Sexual Activity: Not on file     Other Topics Concern   • Not on file     Social History Narrative   • No narrative on file     Living situation: Lives with   PCP : No primary care provider on file.        Physical Exam     Filed Vitals:    06/21/17 1245 06/21/17 1345 06/21/17 1426 06/21/17 1627   BP:   144/60 114/65   Pulse: 78 79 84 81   Temp:   36.3 °C  "(97.4 °F) 36.4 °C (97.5 °F)   Resp: 16 17 16 16   Height:       Weight:       SpO2: 94% 88% 98% 95%     Body mass index is 23.92 kg/(m^2).  /65 mmHg  Pulse 81  Temp(Src) 36.4 °C (97.5 °F)  Resp 16  Ht 1.6 m (5' 3\")  Wt 61.236 kg (135 lb)  BMI 23.92 kg/m2  SpO2 95%  O2 therapy: Pulse Oximetry: 95 %, O2 (LPM): 0, O2 Delivery: None (Room Air)    Physical Exam   Constitutional: She is oriented to person, place, and time. She appears unhealthy.   HENT:   Head: Normocephalic and atraumatic.   Eyes: EOM are normal. Pupils are equal, round, and reactive to light.   Cardiovascular: Normal rate, regular rhythm and normal heart sounds.    Pulmonary/Chest: Effort normal and breath sounds normal. She has no wheezes.   Abdominal: Soft. She exhibits distension. She exhibits no mass. Bowel sounds are hypoactive. There is tenderness. There is no rebound and no guarding.   Neurological: She is alert and oriented to person, place, and time. GCS score is 15.   Skin: There is erythema.                  Data Review       Old Records Request:   Completed  Current Records review and summary: Completed    Lab Data Review:  Recent Results (from the past 24 hour(s))   TSH (Thyroid Stimulating Hormone)    Collection Time: 06/21/17  5:30 AM   Result Value Ref Range    TSH 3.630 0.300 - 3.700 uIU/mL   PROTHROMBIN TIME    Collection Time: 06/21/17  5:30 AM   Result Value Ref Range    PT 24.7 (H) 12.0 - 14.6 sec    INR 2.15 (H) 0.87 - 1.13   VITAMIN B12    Collection Time: 06/21/17  5:30 AM   Result Value Ref Range    Vitamin B12 -True Cobalamin >1500 (H) 211 - 911 pg/mL   MAGNESIUM    Collection Time: 06/21/17  5:30 AM   Result Value Ref Range    Magnesium 2.3 1.5 - 2.5 mg/dL   Lactic acid (lactate)    Collection Time: 06/21/17  5:35 AM   Result Value Ref Range    Lactic Acid 2.8 (H) 0.5 - 2.0 mmol/L   CBC WITH DIFFERENTIAL    Collection Time: 06/21/17  5:35 AM   Result Value Ref Range    WBC 9.9 4.8 - 10.8 K/uL    RBC 3.43 (L) 4.20 " - 5.40 M/uL    Hemoglobin 11.4 (L) 12.0 - 16.0 g/dL    Hematocrit 36.5 (L) 37.0 - 47.0 %    .4 (H) 81.4 - 97.8 fL    MCH 33.2 (H) 27.0 - 33.0 pg    MCHC 31.2 (L) 33.6 - 35.0 g/dL    RDW 68.4 (H) 35.9 - 50.0 fL    Platelet Count 92 (L) 164 - 446 K/uL    MPV 10.9 9.0 - 12.9 fL    Neutrophils-Polys 71.40 44.00 - 72.00 %    Lymphocytes 9.50 (L) 22.00 - 41.00 %    Monocytes 15.70 (H) 0.00 - 13.40 %    Eosinophils 2.60 0.00 - 6.90 %    Basophils 0.40 0.00 - 1.80 %    Immature Granulocytes 0.40 0.00 - 0.90 %    Nucleated RBC 0.00 /100 WBC    Lymphs (Absolute) 0.94 (L) 1.00 - 4.80 K/uL    Monos (Absolute) 1.56 (H) 0.00 - 0.85 K/uL    Eos (Absolute) 0.26 0.00 - 0.51 K/uL    Baso (Absolute) 0.04 0.00 - 0.12 K/uL    Immature Granulocytes (abs) 0.04 0.00 - 0.11 K/uL    NRBC (Absolute) 0.00 K/uL    Neutrophils (Absolute) 7.08 2.00 - 7.15 K/uL    Anisocytosis 2+     Macrocytosis 2+    COMP METABOLIC PANEL    Collection Time: 06/21/17  5:35 AM   Result Value Ref Range    Sodium 136 135 - 145 mmol/L    Potassium 4.0 3.6 - 5.5 mmol/L    Chloride 103 96 - 112 mmol/L    Co2 27 20 - 33 mmol/L    Anion Gap 6.0 0.0 - 11.9    Glucose 96 65 - 99 mg/dL    Bun 84 (HH) 8 - 22 mg/dL    Creatinine 1.31 0.50 - 1.40 mg/dL    Calcium 8.9 8.5 - 10.5 mg/dL    AST(SGOT) 155 (H) 12 - 45 U/L    ALT(SGPT) 153 (H) 2 - 50 U/L    Alkaline Phosphatase 129 (H) 30 - 99 U/L    Total Bilirubin 10.2 (H) 0.1 - 1.5 mg/dL    Albumin 2.1 (L) 3.2 - 4.9 g/dL    Total Protein 8.6 (H) 6.0 - 8.2 g/dL    Globulin 6.5 (H) 1.9 - 3.5 g/dL    A-G Ratio 0.3 g/dL   TROPONIN    Collection Time: 06/21/17  5:35 AM   Result Value Ref Range    Troponin I 0.07 (H) 0.00 - 0.04 ng/mL   BILIRUBIN DIRECT    Collection Time: 06/21/17  5:35 AM   Result Value Ref Range    Direct Bilirubin 5.9 (H) 0.1 - 0.5 mg/dL   AMMONIA    Collection Time: 06/21/17  5:35 AM   Result Value Ref Range    Ammonia 52 (H) 11 - 45 umol/L   BILIRUBIN INDIRECT    Collection Time: 06/21/17  5:35 AM   Result  Value Ref Range    Indirect Bilirubin 4.3 (H) 0.0 - 1.0 mg/dL   ESTIMATED GFR    Collection Time: 17  5:35 AM   Result Value Ref Range    GFR If  47 (A) >60 mL/min/1.73 m 2    GFR If Non  39 (A) >60 mL/min/1.73 m 2   PERIPHERAL SMEAR REVIEW    Collection Time: 17  5:35 AM   Result Value Ref Range    Peripheral Smear Review see below    PLATELET ESTIMATE    Collection Time: 17  5:35 AM   Result Value Ref Range    Plt Estimation Decreased    MORPHOLOGY    Collection Time: 17  5:35 AM   Result Value Ref Range    RBC Morphology Present    DIFFERENTIAL COMMENT    Collection Time: 17  5:35 AM   Result Value Ref Range    Comments-Diff see below    EKG (NOW)    Collection Time: 17  5:43 AM   Result Value Ref Range    Report       [facility] Emergency Dept.    Test Date:  2017  Pt Name:    VERA LENNON                  Department: ER  MRN:        [id number]                      Room:       [facility]  Gender:     F                            Technician: [id number]  :        [id number]                   Requested By:ESTHELA CARROLL  Order #:    [id number]                    Reading MD:    Measurements  Intervals                                Axis  Rate:       79                           P:          -39  NM:         144                          QRS:        -9  QRSD:       94                           T:          35  QT:         416  QTc:        477    Interpretive Statements  SINUS RHYTHM  PROBABLE LVH WITH SECONDARY REPOL ABNRM  No previous ECG available for comparison     LIPASE    Collection Time: 17  5:57 AM   Result Value Ref Range    Lipase 159 (H) 11 - 82 U/L   URINALYSIS    Collection Time: 17  7:55 AM   Result Value Ref Range    Micro Urine Req Microscopic     Color Dk. Yellow     Character Cloudy (A)     Specific Gravity 1.016 <1.035    Ph 7.5 5.0-8.0    Glucose Negative Negative mg/dL    Ketones Negative Negative  mg/dL    Protein 50 (A) Negative mg/dL    Nitrite Negative Negative    Leukocyte Esterase Large (A) Negative    Occult Blood Moderate (A) Negative   UR BILI ICTOTEST    Collection Time: 06/21/17  7:55 AM   Result Value Ref Range    Bilirubin Negative Negative   URINE MICROSCOPIC (W/UA)    Collection Time: 06/21/17  7:55 AM   Result Value Ref Range    WBC Packed (A) /hpf    RBC 5-10 (A) /hpf    Bacteria Moderate (A) None /hpf    Amorphous Crystal Present /hpf    Epithelial Cells Rare /hpf   PLATELET MAPPING WITH BASIC TEG    Collection Time: 06/21/17  3:05 PM   Result Value Ref Range    Reaction Time Initial-R 4.6 (L) 5.0 - 10.0 min    Clot Kinetics-K 2.1 1.0 - 3.0 min    Clot Angle-Angle 63.0 53.0 - 72.0 degrees    Maximum Clot Strength-MA 51.9 50.0 - 70.0 mm    Lysis 30 minutes-LY30 0.0 0.0 - 8.0 %    % Inhibition ADP 51.9 %    % Inhibition AA 5.3 %    TEG Algorithm Link Algorithm        Imaging/Procedures Review:    ndependant Imaging Review: Completed  [id number] VIEW   Final Result      No acute findings.      US-GALLBLADDER   Final Result         1. Cirrhotic appearing liver.      2. Gallbladder sludge.      3. Perihepatic ascites.      4. Nonspecific echogenic pancreas.      CT-HEAD W/O   Final Result      1.  Diffuse atrophy and white matter changes.   2.  No acute intracranial hemorrhage or territorial infarct.   3.  Chronic RIGHT temporal encephalomalacia with compensatory dilation of temporal horn RIGHT lateral ventricle.      DX-CHEST-PORTABLE (1 VIEW)   Final Result      1.  Mild cardiomegaly.   2.  No pneumonia or pulmonary edema.      [id number]    (Results Pending)            EKG:   EKG Independant Review: Completed  QTc:477, HR: 79, Normal Sinus Rhythm, no ST/T changes     Records reviewed and summarized in current documentation             Assessment/Plan     Hyperbilirubinemia (present on admission)  Assessment & Plan  T. Bili elevated at 10.  50% of the elevation is indirect bilirubin and  50% is direct bilirubin indicating intrahepatic process.  Likely secondary to alcoholic hepatitis and cirrhosis. However given ultrasound of abdomen revealing GB sludge and thickened gallbladder wall, there may biliary duct obstruction as well.  Will continue to monitor.    Alcoholic cirrhosis of liver with ascites (CMS-HCC) (present on admission)  Assessment & Plan  Likely alcohol related from chronic alcohol abuse.  Last drink was reportedly a month ago, so out of range for withdrawal.  Cirrhosis was identified on ultrasound..  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  Poor prognosis.    Abdominal pain (present on admission)  Assessment & Plan  Unclear as the source of abdominal pain.  Initially cholecystitis was thought to be the etiology based on imaging findings but SBP could be a possibility too.  GERD or alcoholic gastritis could be a possibility as well.  Will get MRCP to look for gallstones.  Currently on zosyn for empiric coverage of abdominal source of infection.    Urinary tract infection (present on admission)  Assessment & Plan  UA revealed the presence of a UTI.  Currently has zosyn for coverage.  Urine cultures pending.    Thrombocytopenia (CMS-HCC) (present on admission)  Assessment & Plan  Likely secondary to alcoholic liver disease.  Will check TEG study.  Continue to monitor.    Alcoholic hepatitis (present on admission)  Assessment & Plan  Patient's liver enzymes elevated but patient's last drink was a month ago.  Could be having alcoholic hepatitis since a month.  INR is elevated and T. Bili is at 10.  Maddrey's discriminant function is 60.6 which indicates steroid use to reduce mortality.  Solumedrol initiated.    Excoriation of buttock (present on admission)  Assessment & Plan  Etiology from being bed bound.  Is erythematous and oozing.  Appears to be infected, will need to cover with antibiotics.  Received one dose of vancomycin and then switched  over to doxycycline.  Wound care consult in place.    Acute kidney injury (CMS-HCC) (present on admission)  Assessment & Plan  Likely pre-renal in origin.  Will give IV fluids.  Further workup if not improving with fluid challenge.        Anticipated Hospital stay:  >2 midnights        Quality Measures  EKG reviewed, Labs reviewed, Medications reviewed and Radiology images reviewed  العراقي catheter: Critically Ill - Requiring Accurate Measurement of Urinary Output      DVT Prophylaxis: Contraindicated - High bleeding risk  DVT prophylaxis - mechanical: SCDs  Ulcer prophylaxis: Yes  Antibiotics: Treating active infection/contamination beyond 24 hours perioperative coverage

## 2017-06-22 PROBLEM — R79.89 ELEVATED TROPONIN: Status: ACTIVE | Noted: 2017-01-01

## 2017-06-22 PROBLEM — A41.9 SEPSIS, UNSPECIFIED: Status: ACTIVE | Noted: 2017-01-01

## 2017-06-22 NOTE — ASSESSMENT & PLAN NOTE
Etiology from being bed bound  Doesn't appear to need antibiotics as it does not look infected  Wound care consult in place

## 2017-06-22 NOTE — PROGRESS NOTES
Bedside report completed, patient resting in bed.  Patient confused, fall precautions in place, will monitor.

## 2017-06-22 NOTE — PROGRESS NOTES
Patient resting in bed, no distress noted, call light in reach, bed alarm on, will monitor.  Monitor summary: SR 76-84, rare PVC, 18/10/40.

## 2017-06-22 NOTE — PROGRESS NOTES
Jessica from Lab called with critical result of blood cultures gram negative rods at 1938. Critical lab result read back to Jessica.  Dr. Mcqueen notified of critical lab result at 2008.  Critical lab result read back by Dr. Mcqueen.

## 2017-06-22 NOTE — DIETARY
NUTRITION SERVICES - Newly identified wound noted in nutrition admit screen. Wound team consult pending, will await wound staging to make recommendations. RD will monitor per dept policy.

## 2017-06-22 NOTE — ASSESSMENT & PLAN NOTE
Most likely etiology is contrast induced nephropathy. However ATN or hepatorenal syndrome could be another possibility.   FeNa is 0.11% showing pre-renal.   Urine eosonophils are negative.  Daily urine output around 300-400cc.   Nephro team onboard. Will do 3 times/week HD now, TTS schedule

## 2017-06-22 NOTE — ASSESSMENT & PLAN NOTE
T. Bili elevated at 10.  50% of the elevation is indirect bilirubin and 50% is direct bilirubin indicating intrahepatic process.  Likely secondary to alcoholic vs viral hepatitis and/or cirrhosis.  Will continue to monitor.  MRCP does not reveal any obstruction.

## 2017-06-22 NOTE — THERAPY
"Speech Language Therapy Clinical Swallow Evaluation completed.  Functional Status: Fxnl swallow for a slightly modified diet in pt with advancing age, L labial droop of unknown etiology with wax/waning recent attn/alertness.  Fully alert during this interaction.  Recommendations - Diet: Diet / Liquid Recommendation: Dysphagia II, Thin Liquid                          Strategies: Monitor during meals, Assistance needed for meal tray set-up, No Straws and Head of Bed at 90 Degrees                          Medication Administration: Medication Administration : Float Whole with Puree  Plan of Care: Will benefit from Speech Therapy 3 times per week  Post-Acute Therapy: Discharge to a transitional care facility for continued skilled therapy services.    See \"Rehab Therapy-Acute\" Patient Summary Report for complete documentation.   "

## 2017-06-22 NOTE — SENIOR ADMIT NOTE
SENIOR ADMIT NOTE      Ms. Arrington is a 82 year old female with past medical history of chronic alcoholism admitted for further evaluation and treatment of progressively worsening generalized weakness over the past one month, likely due to mod-severe hepatic dysfunction likely due to acute on chronic alcoholic liver disease, but will rule obstructive hepatopathy (check MRCP, empiric Zosyn). Also noted to have UTI (which Zosyn will treat). Additional workup to Rule out other causes of Hepatic failure (check Hep panel, Emma). Will have surgical input for possible acalculous cholecystitis (mild ruq pain) however suspect patient's abnormal gallbladder findings are primarily due to hepatic failure. Will need to monitor coagulopathy, encephalopathy, renal function, signs of any new or worsening infection.      (Please refer to Dr Diaz's H&P for complete details.)

## 2017-06-22 NOTE — WOUND TEAM
[facility]  Inpatient Services  Initial Wound & Skin Care Evaluation    Admission Date:    6/21/17       HPI, PMH, SH: Reviewed  Unit where seen by Wound Team:  T 701    WOUND CONSULT RELATED TO: IAD to backside     SUBJECTIVE:  Pt not really communicative, occasionally moaned with turning     Self Report / Pain Level: moaning and groaning with cleansing     OBJECTIVE:  Pt supine in bed on waffle overlay    WOUND TYPE, LOCATION, CHARACTERISTICS (Pressure ulcers: location, stage, POA or date identified)    Wound Type/Location: severe IAD, bilateral buttocks and erma area    Periwound: red, excoriated, denuded     Drainage: min serosanguinous       Tissue Type and %:   100% red   Wound Edges:  Open, irregular     Odor: none      Exposed structure(s): none  S&S of Infection:  none    Measurements: taken 6/22/17                                Entire erma area and bilateral medial buttocks involved     INTERVENTIONS BY WOUND TEAM:  Pt assisted into side lying position, backside cleansed with dimethicone wipes. COvered all involved areas with nickel  thick layer of JAZMIN followed by strips of viscopaste.  Repositioned Pt and discussed with RN.  Dressing maintenance orders written.       Dressing types: JAZMIN, visco strips     Interdisciplinary Collaboration:  RN, Pt, Pt's spouse     EVALUATION:  Pt presents with significant IAD to bilateral buttocks and erma area.  JAZMIN and visco patches used to protect and soothe the area.  Area already appears improved from initial admission photos taken only yesterday.      Factors affecting wound healing:  Incontinence, decreased mobility, altered mentation      Goals:  Area to decrease in size by 1%/week     NURSING PLAN OF CARE: (X)  Dressing changes: See Dressing Maintenance orders: X  Skin care: See Skin Care orders:   Rectal tube care: See Rectal Tube Care orders:   Other orders:    RSKIN: CURRENT (X) ORDERED (O)  Q shift Case:  X  Q shift pressure point  assessments:  X  Atmosair X       JAC      Bariatric JAC      Bariatric foam        Heel float boots       Heels floated on pillows      Barrier wipes      Barrier Cream      Barrier paste   O   Sacral silicone dressing      Silicone O2 tubing      Anchorfast      Trach with Optifoam split foam       Waffle cushion    X- overlay   Rectal tube or BMS      Antifungal tx    Turn q 2 hours X  Up to chair    Ambulate   PT/OT  X   Dietician      PO  X   TF   TPN     PVN    NPO   # days   Other       WOUND TEAM PLAN OF CARE (X):   NPWT change 3 x week:        Dressing changes by wound team:       Follow up as needed:   X    Other (explain):    Anticipated discharge plans (X):  SNF:           Home Care:           Outpatient Wound Center:            Self Care:            Other:   TBD

## 2017-06-22 NOTE — PROGRESS NOTES
"  Trauma/Surgical Progress Note    Author: Kizzy Kelsey Date & Time created: 6/22/2017   8:14 AM     Interval Events:    Denies abdominal pain  MRCP pending  Continue conservative management  No surgical indications    Review of Systems   Constitutional: Negative for chills.   Eyes: Negative for double vision.   Cardiovascular: Negative for chest pain.   Gastrointestinal: Negative for nausea and abdominal pain.   Musculoskeletal: Negative for myalgias.   Skin: Negative for rash.   Neurological: Negative for headaches.     Hemodynamics:  Blood pressure 166/73, pulse 80, temperature 36.4 °C (97.6 °F), resp. rate 16, height 1.6 m (5' 3\"), weight 68.6 kg (151 lb 3.8 oz), SpO2 94 %.     Respiratory:    Respiration: 16, Pulse Oximetry: 94 %        RUL Breath Sounds: Diminished, RML Breath Sounds: Diminished, RLL Breath Sounds: Diminished, SARAH Breath Sounds: Diminished, LLL Breath Sounds: Diminished  Fluids:    Intake/Output Summary (Last 24 hours) at 06/22/17 0814  Last data filed at 06/22/17 0500   Gross per 24 hour   Intake    668 ml   Output   1200 ml   Net   -532 ml     Admit Weight: 61.236 kg (135 lb)  Current Weight: 68.6 kg (151 lb 3.8 oz)    Physical Exam   Constitutional: No distress.   HENT:   Head: Normocephalic.   Neck: No JVD present. No tracheal deviation present.   Cardiovascular: Normal rate.    Pulmonary/Chest: Effort normal. No respiratory distress.   Abdominal: Soft. She exhibits no distension. There is no tenderness. There is no guarding.   Neurological: She is alert.   Skin: Skin is warm and dry.   Nursing note and vitals reviewed.      Medical Decision Making/Problem List:    Active Hospital Problems    Diagnosis   • Hyperbilirubinemia [E80.6]     Priority: High   • Alcoholic cirrhosis of liver with ascites (CMS-HCC) [K70.31]     Priority: High     6/21 - Liver nodular in appearance on U/S / + small ascites  Elevated ammonia / INR> 2  Rocio - Hutton score 12  (82% perioperative mortality)  MELD " score 26 ( 56% perioperative mortality)  TEG with platelet mapping completed  MR abdomen pending     • Elevated troponin [R74.8]     Priority: Medium   • Abdominal pain [R10.9]     Priority: Medium   • Urinary tract infection [N39.0]     Priority: Medium   • Thrombocytopenia (CMS-HCC) [D69.6]     Priority: Medium     Plt 92  Follow     • Alcoholic hepatitis [K70.10]     Priority: Medium   • Acute kidney injury (CMS-HCC) [N17.9]     Priority: Low   • Excoriation of buttock [S30.810A]     Core Measures & Quality Metrics:  Labs reviewed, Medications reviewed and Radiology images reviewed        DVT Prophylaxis: Contraindicated - High bleeding risk            Total Score: 0    Discussed patient condition with RN, Patient and general surgery, DrAugie Garibay.    Patient seen, data reviewed and discussed.  Agree with assessment and plan.  HARMAN

## 2017-06-22 NOTE — ASSESSMENT & PLAN NOTE
Source of infection is likely UTI/SBP  Blood cultures identified E.coli in 2/2 bottles on 06/21  Results from ascites fluid culture negative, morphology showed >250 PMNs  C diff PCR toxin taken on 7/1 are negative  Blood cultures taken on 06/30 were negative   WBC increased today, but overall trend is downwards, will monitor  Continue Zosyn and Vancomycin, started on 07/02, will continue course for 10-14 days

## 2017-06-22 NOTE — PROGRESS NOTES
Discussed acute and chronic medical issues in detail with patient (who is much more alert today) and . Her advanced alcoholic liver disease is c/w a very poor long term prognosis. Accordingly, the patient and her  agree that they would not want resuscitative measures in the or aggressive interventions in the case of her demise. A DNR order has been written in accordance with their wishes which I believe is very appropriate in her case. Depending upon the results of her pending test and her clinical course, we may also consider palliative care. See ruba.    PARAS Hinson MD  [facility] [place] [facility] [place]ending Physician

## 2017-06-22 NOTE — ASSESSMENT & PLAN NOTE
Likely alcohol related from chronic alcohol abuse vs hepatitis C.  Cirrhosis was identified on ultrasound.  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  Underwent a paracentesis with IR on 7/1 which drained 4L  Fluid showed 500 WBC with 86% neutrophils  Culture is negative  Keep current Zosyn/Vanco for SBP.

## 2017-06-22 NOTE — ASSESSMENT & PLAN NOTE
Unclear as the source of abdominal pain.  Initially cholecystitis was thought to be the etiology based on imaging findings but SBP could be a possibility too.  GERD or alcoholic gastritis could be a possibility as well.  Resolved currently.

## 2017-06-22 NOTE — ASSESSMENT & PLAN NOTE
Troponins elevated at 0.07 and repeat measurement was at the same.  EKG was negative for ischemic changes.  Likely secondary to demand ischemia.  Monitor for cardiac symptoms.

## 2017-06-22 NOTE — ASSESSMENT & PLAN NOTE
Patient's liver enzymes elevated but patient's last drink was a month ago.  Could be having alcoholic hepatitis since a month.  INR is elevated and T. Bili is at 10.  Maddrey's discriminant function is 60.6 which indicates steroid use to reduce mortality but it is unclear how much bilirubin is from the hepatitis vs biliary obstruction.  In any case starting steroids is contraindicated as patient is septic.  On pentoxifylline at renal dosing.  Also on rifaximin and lactulose. Ammonia has been stable since admission.  Gastroenterology following, appreciate recommendations.

## 2017-06-22 NOTE — ASSESSMENT & PLAN NOTE
UA revealed the presence of a UTI.  Zosyn/Vanco for SBP should also cover her UTI.   Urine cultures discarded as they were non-specific  العراقي Catheter changed on 7/06/2017

## 2017-06-22 NOTE — ASSESSMENT & PLAN NOTE
Platelets increasing  Serotonin Assay negative  Will still continue to hold all heparin due to high bleeding risk, talked to nephrology and they can use their discretion regarding heparin use for dialysis  Will continue to monitor

## 2017-06-22 NOTE — PROGRESS NOTES
[facility] Internal Medicine Interval Note    Name Abigail Bradley     1935   Age/Sex 82 y.o. female   MRN [id number]   Code Status Full code     After 5PM or if no immediate response to page, please call for cross-coverage  Attending/Team: Dr. Hinson/Melchor Use Tiger Text to page  6AM-5PM  Call [phone] to page afterhours   1st Call - Day Intern (R1):   Dr. Diaz 2nd Call - Day Sr. Resident (R2/R3):   Dr. Addison         Chief complaint/ reason for interval visit (Primary Diagnosis)   Fatigue; Abdominal pain; UTI; Sepsis    Interval Problem Daily Status Update    Mr. Bradley was admitted yesterday for treatment of sepsis and for further workup of elevated liver enzymes. Blood cultures came back positive for gram negative rods 2/2. Her hepatitis C antibody screen is also positive. She is subjectively doing better and denies any active complaints. Updated patient and  about plan and discussed at length her poor prognosis. Patient elected to go DNAR.      Review of Systems   Constitutional: Positive for malaise/fatigue. Negative for fever and chills.   HENT: Negative for congestion and sore throat.    Eyes: Negative for blurred vision and double vision.   Respiratory: Negative for cough and shortness of breath.    Cardiovascular: Negative for chest pain and palpitations.   Gastrointestinal: Negative for nausea, vomiting and abdominal pain.   Genitourinary: Negative for dysuria and urgency.   Musculoskeletal: Negative for back pain and falls.   Skin: Negative for rash.   Neurological: Positive for weakness. Negative for tremors, focal weakness and headaches.   Psychiatric/Behavioral: Negative for depression and suicidal ideas.         Quality Measures  EKG reviewed, Labs reviewed, Medications reviewed and Radiology images reviewed  العراقي catheter: Critically Ill - Requiring Accurate Measurement of Urinary Output      DVT Prophylaxis: Contraindicated - High bleeding risk  DVT  prophylaxis - mechanical: SCDs  Ulcer prophylaxis: Yes  Antibiotics: Treating active infection/contamination beyond 24 hours perioperative coverage            Physical Exam       Filed Vitals:    06/21/17 2345 06/22/17 0357 06/22/17 0715 06/22/17 1130   BP: 140/64 156/69 166/73 119/54   Pulse: 83 79 80 69   Temp: 36.7 °C (98.1 °F) 36.6 °C (97.8 °F) 36.4 °C (97.6 °F) 36.5 °C (97.7 °F)   Resp: 14 15 16 17   Height:       Weight:       SpO2: 91% 93% 94% 97%     Body mass index is 26.8 kg/(m^2). Weight: 68.6 kg (151 lb 3.8 oz)  Oxygen Therapy:  Pulse Oximetry: 97 %, O2 (LPM): 0, O2 Delivery: None (Room Air)    Physical Exam   Constitutional: She is oriented to person, place, and time. She appears unhealthy.   HENT:   Head: Normocephalic and atraumatic.   Eyes: EOM are normal. Pupils are equal, round, and reactive to light.   Cardiovascular: Normal rate, regular rhythm and normal heart sounds.    Pulmonary/Chest: Effort normal. She has no wheezes. She has no rales.   Abdominal: Soft. Bowel sounds are normal. She exhibits distension. There is tenderness. There is no rebound and no guarding.   Neurological: She is alert and oriented to person, place, and time. GCS score is 15.         Lab Data Review:      6/22/2017  12:36 PM    Recent Labs      06/21/17   0530  06/21/17   0535  06/22/17   0313   SODIUM   --   136  141   POTASSIUM   --   4.0  3.5*   CHLORIDE   --   103  109   CO2   --   27  27   BUN   --   84*  63*   CREATININE   --   1.31  1.21   MAGNESIUM  2.3   --   2.1   CALCIUM   --   8.9  8.5       Recent Labs      06/21/17   0535  06/21/17   0557  06/22/17   0313   ALTSGPT  153*   --   136*   ASTSGOT  155*   --   136*   ALKPHOSPHAT  129*   --   111*   TBILIRUBIN  10.2*   --   9.5*   DBILIRUBIN  5.9*   --    --    LIPASE   --   159*   --    GLUCOSE  96   --   106*       Recent Labs      06/21/17   0530  06/21/17   0535  06/22/17   0313   RBC   --   3.43*  3.26*   HEMOGLOBIN   --   11.4*  10.8*   HEMATOCRIT   --    36.5*  35.0*   PLATELETCT   --   92*  83*   PROTHROMBTM  24.7*   --   26.2*   INR  2.15*   --   2.32*       Recent Labs      06/21/17   0535  06/22/17   0313   WBC  9.9  9.2   NEUTSPOLYS  71.40  81.20*   LYMPHOCYTES  9.50*  2.60*   MONOCYTES  15.70*  11.10   EOSINOPHILS  2.60  2.50   BASOPHILS  0.40  0.00   ASTSGOT  155*  136*   ALTSGPT  153*  136*   ALKPHOSPHAT  129*  111*   TBILIRUBIN  10.2*  9.5*     Active Problems:    Hyperbilirubinemia POA: Yes    Alcoholic cirrhosis of liver with ascites (CMS-HCC) POA: Yes    Sepsis (CMS-HCC) POA: Yes    Abdominal pain POA: Yes    Urinary tract infection POA: Yes    Thrombocytopenia (CMS-HCC) POA: Yes      Alcoholic hepatitis POA: Yes    Excoriation of buttock POA: Yes    Elevated troponin POA: Yes    Acute kidney injury (CMS-HCC) POA: Yes  Resolved Problems:    * No resolved hospital problems. *      Assessment/Plan     Hyperbilirubinemia (present on admission)  Assessment & Plan  T. Bili elevated at 10.  50% of the elevation is indirect bilirubin and 50% is direct bilirubin indicating intrahepatic process.  Likely secondary to alcoholic vs viral hepatitis and/or cirrhosis. However given ultrasound of abdomen revealing GB sludge and thickened gallbladder wall, there may biliary duct obstruction as well.  Will continue to monitor.  MRCP pending, if it is negative then it is most likely related to liver.    Alcoholic cirrhosis of liver with ascites (CMS-HCC) (present on admission)  Assessment & Plan  Likely alcohol related from chronic alcohol abuse. Although hepatitis C could be still be possible.  Last drink was reportedly a month ago, so out of range for withdrawal.  Cirrhosis was identified on ultrasound.  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  Poor prognosis.    Sepsis (CMS-HCC) (present on admission)  Assessment & Plan  Source of infection is likely UTI.  Blood cultures identified gram negative rods in 2/2 bottles but urine  cultures are pending.  If urine cultures grow a different organism then another possible source is the gallbladder.  Will wait for further culture results.  Although lactic acid is elevated it could be type 2 lactic acidosis from liver failure.  Will monitor patient clinically.  Continue with IV maintenance fluids.  On zosyn for coverage of potentially both sources currently.  If fever develops despite being zosyn being on board then an aminoglycoside such as tobramycin is to be added at 3ml/kg/day at approximately 100mg Q12 hrs.    Abdominal pain (present on admission)  Assessment & Plan  Unclear as the source of abdominal pain.  Initially cholecystitis was thought to be the etiology based on imaging findings but SBP could be a possibility too.  GERD or alcoholic gastritis could be a possibility as well.  Will get MRCP to look for gallstones.  Currently on zosyn for empiric coverage of abdominal source of infection.    Urinary tract infection (present on admission)  Assessment & Plan  UA revealed the presence of a UTI.  Currently has zosyn for coverage.  Urine cultures pending.    Thrombocytopenia (CMS-HCC) (present on admission)  Assessment & Plan  Likely secondary to alcoholic liver disease.  TEG study reveals a near normal coagulable state.  Continue to monitor.    Alcoholic hepatitis (present on admission)  Assessment & Plan  Patient's liver enzymes elevated but patient's last drink was a month ago.  Could be having alcoholic hepatitis since a month.  INR is elevated and T. Bili is at 10.  Maddrey's discriminant function is 60.6 which indicates steroid use to reduce mortality but it is unclear how much bilirubin is from the hepatitis vs biliary obstruction.  In any case starting steroids is contraindicated as patient is septic.    Excoriation of buttock (present on admission)  Assessment & Plan  Etiology from being bed bound.  Is erythematous and oozing.  Received one dose of vancomycin and then switched over  to doxycycline for a day.  Doesn't appear to need antibiotics.  Wound care consult in place.    Elevated troponin (present on admission)  Assessment & Plan  Troponins elevated at 0.07 and repeat measurement was at the same.  EKG was negative for ischemic changes.  Likely secondary to demand ischemia.  Monitor for cardiac symptoms.    Acute kidney injury (CMS-HCC) (present on admission)  Assessment & Plan  Likely pre-renal in origin.  Improving with IV fluids.  Continue maintenance IV fluids.      Consultants/Specialty  Surgery: Dr. Garibay    Disposition  Remain inpatient for further managment

## 2017-06-22 NOTE — PROGRESS NOTES
"Patient confused and frequently asking about going home, \"where is my ?\"  Patient re-oriented frequently, strip alarm on, will monitor.  "

## 2017-06-22 NOTE — CONSULTS
"DATE OF SERVICE:  06/21/2017    DATE OF SERVICE:  06/21/2017    REQUESTING PHYSICIAN:  BANDAR Hinson MD    CONSULTATION:  Renal consult is Jeremi Arauz.    HISTORY OF PRESENT ILLNESS:  This 82-year-old female who presents to emergency   department with ongoing abdominal pain.  It has been going on for probably a   month.  She is a poor historian, not able to give precise details.  She is   uncertain as to what exacerbates it.  She reports no nausea, vomiting,   diarrhea, no chest pain, no fevers, no chills.  She is not able to say if any   food tends to make it worse.  Patient states she does have a history of   alcohol use more than a glass a day, but stopped about a month ago due to the   abdominal pain.  Also notice some possible facial droop.  She is unable to   give information regarding this as this is new .    ALLERGIES:  The patient has no allergies by report.    MEDICATIONS:  She takes reportedly aspirin and ibuprofen at home.  She also   takes calcium and B complex.    PAST SURGICAL HISTORY:  Denies.    PAST MEDICAL HISTORY:  Denies cardiac problems, no respiratory problems, no   diabetes.    SOCIAL HISTORY:  She is .  Her  is in [facility].  She has   never used tobacco products, does have alcohol regularly, but none for the   last month.  When pushed as to how much, she said \"well at least a glass a   day.\"  No drug use.    FAMILY HISTORY:  I note some family history.    REVIEW OF SYSTEMS:  Negative for chest pain and shortness of breath.  Positive   for abdominal pain and some distention.  As well as positive for, neuro says   left facial droop.     PHYSICAL EXAMINATION:  VITAL SIGNS:  Temperature is 36.4, heart rate 88, blood pressure 144/60,   respiratory rate is 16.  She is on room air sats are 98%.  Her height is 160   cm, weight is 61.2 kilos, BMI is 23.9.  GENERAL:  On exam, ill-appearing elderly female in no distress at this time.  HEENT:  Normocephalic.  Pupils equal, " round, reactive to light.  Sclerae is   slightly icteric.  Mucous members are moist.  NECK:  Supple, nontender, full range of motion.  No JVD.  CHEST:  Breath sounds are present bilaterally clear.  Decreased at the bases.  CARDIAC:  Rhythm is regular, no murmurs, no ectopy.  ABDOMEN:  Soft, nontender, slightly distended.  She has no peritoneal signs.    No guarding or rebound tenderness.  She has no Nathan sign.  She has no   tenderness in the upper abdomen at this time.  BACK:  She has no CVA tenderness.  No step off.  EXTREMITIES:  No clubbing, cyanosis or edema.  SKIN:  Warm and dry.  NEUROLOGIC:  Speech is slightly slurred.  Her GCS at this time is 14.  She   knows her name and was confused about the months as well as being in the   hospital, thought she was at home.  She also has a left-sided facial droop.    Her  are weak, her right is 4, left is 3/5.    Ultrasound of the abdomen shows a nodular-appearing liver, will it be due to a   diffuse hepatocellular disease, no obvious mass.  There is sludge within the   gallbladder.  Gallbladder wall was a 0.4 cm.  There is Perihepatic ascites.    There is no pericystic colic fluid.  Patient appears to have cirrhosis of the   liver and sludge in the gallbladder and a small amount of ascites.  CT head   shows diffuse atrophy and white matter changes with chronic right temporal   encephalomalacia.    LABORATORY STUDIES:  WBC of 9.9, hemoglobin 11.4, hematocrit 36.5, platelets   are 92, patient has elevated MCV at 106, MCH is elevated at 33, heart rate is   elevated at 68.  Differential shows 71 neutrophils, 9 lymphocytes, 15   monocytes.  Chemistry, sodium is 136, potassium 4.0, chloride 103, CO2 of 27,   BUN is 84, creatinine is 1.3, calcium 8.9, glucose is 96.  AST is 155, ALT of   153, alkaline phosphatase 129, total bilirubin of 10.2 with a indirect of 4.3,   direct of 5.9, albumin of 2.1, magnesium of 2.3, lipase is 159.  Ammonia is   52, lactic acid is 2.8.   PT is 24.7 with an INR of 2.15.  TEG shows a R-time   of 4.6, angle 63, clot strength of 51.9, LY30 0, AA inhibition is 5, ADP   inhibition is 52.  UA is positive for leukocyte esterase, blood as well as   packed with white blood cells and moderate bacteria.  TSH is 3.6, B12   greater than 1500.    IMPRESSION: 1. Alcoholic cirrhosis with ascites  2.  Hyperbilirubinemia  3. Thrombocytopenia,  4.  Urinary tract infection  5.  Acute renal insufficiency.    RECOMMENDATIONS:  At this time, the patient is an extremely poor surgical   candidate.  Initially, I thought to get a HIDA scan, but with a bilirubin of   10, she is not a candidate as will have extremely poor sensitivity.  She is   getting an MRCP, this will give some further information.  Clinically, she   does not have acute cholecystitis.  She has no right upper quadrant pain.  She   has known white count.  She does have marked elevation of her liver enzymes,   this could be due to underlying hepatocellular disease or even possibly   choledocholithiasis.  She is written for IV antibiotics to cover her UTI, does   have a العراقي at this time, but her Child-Hutton score is 12, making her Child   C, this gives her 82%  perioperative mortality.  Her MELD score is 26 giving a  20% 3-month mortality and with any sort of surgical intervention gives it a 56%    perioperative mortality.  Again, this patient is an extremely poor surgical   candidate if this is cholecystitis which I doubt.  We would recommend   percutaneous drainage of the gallbladder and nonsurgical intervention.  We   will continue to follow with you.       ____________________________________     MD HARMAN OLEA / MYARA    DD:  06/21/2017 17:09:17  DT:  06/21/2017 18:02:31    D#:  [id number]  Job#:  [id number]

## 2017-06-22 NOTE — PROGRESS NOTES
Patient resting in bed, assessment complete, awake and alert to self.  Repositioned for comfort.  Scheduled medications given, needs addressed, call light in reach, bed alarm on, will monitor.

## 2017-06-22 NOTE — PROGRESS NOTES
2 RN Skin check. Pt displays bilateral bruising on forearms and hands. Pt displays bilateral bruising on flanks. Blanchable redness to bilateral heels and bottom of feet. Pt displays excoriated skin with non-blanching redness on coccyx with skin tears present.  Incontinence associated non-blanching redness to bilateral buttocks, perineal and labia major. Photos taken and wound consult placed.

## 2017-06-23 NOTE — PROGRESS NOTES
[facility] [place] Internal Medicine Interval Note    Name Abigail Bradley     1935   Age/Sex 82 y.o. female   MRN [id number]   Code Status DNAR     After 5PM or if no immediate response to page, please call for cross-coverage  Attending/Team: Dr. Hinson/Melchor Use Tiger Text to page  6AM-5PM  Call [phone] to page afterhours   1st Call - Day Intern (R1):   Dr. Diaz 2nd Call - Day Sr. Resident (R2/R3):   Dr. Addison         Chief complaint/ reason for interval visit (Primary Diagnosis)   Fatigue; Abdominal pain; UTI; Sepsis    Interval Problem Daily Status Update    Unable to tolerate mri  Will order CT for further eval of intra abd pathology  Blood Cx+ for E.coli (source UTI suspected), will repeat blood cx  Patient and  aware that she has advanced cirrhosis from hep c and alcohol use.  Is DNAR      Review of Systems   Constitutional: Positive for malaise/fatigue. Negative for fever and chills.   HENT: Negative for congestion and sore throat.    Eyes: Negative for blurred vision and double vision.   Respiratory: Negative for cough and shortness of breath.    Cardiovascular: Negative for chest pain and palpitations.   Gastrointestinal: Negative for nausea, vomiting and abdominal pain.   Genitourinary: Negative for dysuria and urgency.   Musculoskeletal: Negative for back pain and falls.   Skin: Negative for rash.   Neurological: Positive for weakness. Negative for tremors, focal weakness and headaches.   Psychiatric/Behavioral: Negative for depression and suicidal ideas.         Quality Measures  EKG reviewed, Labs reviewed, Medications reviewed and Radiology images reviewed  العراقي catheter: Critically Ill - Requiring Accurate Measurement of Urinary Output      DVT Prophylaxis: Contraindicated - High bleeding risk  DVT prophylaxis - mechanical: SCDs  Ulcer prophylaxis: Yes  Antibiotics: Treating active infection/contamination beyond 24 hours perioperative  coverage            Physical Exam       Filed Vitals:    06/23/17 0000 06/23/17 0400 06/23/17 0740 06/23/17 1248   BP: 168/64 162/67 153/68 166/54   Pulse: 74 76 82 98   Temp: 36.6 °C (97.9 °F) 36.4 °C (97.6 °F) 36.3 °C (97.3 °F) 36.2 °C (97.2 °F)   Resp: 18 17 18 20   Height:       Weight:       SpO2: 93% 94% 94% 97%     Body mass index is 28.2 kg/(m^2). Weight: 72.2 kg (159 lb 2.8 oz)  Oxygen Therapy:  Pulse Oximetry: 97 %, O2 (LPM): 0, O2 Delivery: None (Room Air)    Physical Exam   Constitutional: She is oriented to person, place, and time. She appears unhealthy.   HENT:   Head: Normocephalic and atraumatic.   Eyes: EOM are normal. Pupils are equal, round, and reactive to light.   Cardiovascular: Normal rate, regular rhythm and normal heart sounds.    Pulmonary/Chest: Effort normal. She has no wheezes. She has no rales.   Abdominal: Soft. Bowel sounds are normal. She exhibits distension. There is tenderness. There is no rebound and no guarding.   Neurological: She is alert and oriented to person, place, and time. GCS score is 15.         Lab Data Review:      6/22/2017  12:36 PM    Recent Labs      06/21/17   0530  06/21/17   0535  06/22/17 0313  06/23/17 0219   SODIUM   --   136  141  141   POTASSIUM   --   4.0  3.5*  3.6   CHLORIDE   --   103  109  113*   CO2   --   27  27  24   BUN   --   84*  63*  48*   CREATININE   --   1.31  1.21  1.15   MAGNESIUM  2.3   --   2.1   --    CALCIUM   --   8.9  8.5  8.0*       Recent Labs      06/21/17   0535  06/21/17   0557  06/22/17 0313 06/23/17 0219   ALTSGPT  153*   --   136*  126*   ASTSGOT  155*   --   136*  133*   ALKPHOSPHAT  129*   --   111*  114*   TBILIRUBIN  10.2*   --   9.5*  8.8*   DBILIRUBIN  5.9*   --    --    --    LIPASE   --   159*   --    --    GLUCOSE  96   --   106*  86       Recent Labs      06/21/17   0530  06/21/17   0535  06/22/17   0313  06/23/17   0219   RBC   --   3.43*  3.26*  3.28*   HEMOGLOBIN   --   11.4*  10.8*  10.8*   HEMATOCRIT    --   36.5*  35.0*  35.7*   PLATELETCT   --   92*  83*  76*   PROTHROMBTM  24.7*   --   26.2*  25.9*   INR  2.15*   --   2.32*  2.29*       Recent Labs      06/21/17   0535  06/22/17   0313  06/23/17   0219   WBC  9.9  9.2  8.4   NEUTSPOLYS  71.40  81.20*   --    LYMPHOCYTES  9.50*  2.60*   --    MONOCYTES  15.70*  11.10   --    EOSINOPHILS  2.60  2.50   --    BASOPHILS  0.40  0.00   --    ASTSGOT  155*  136*  133*   ALTSGPT  153*  136*  126*   ALKPHOSPHAT  129*  111*  114*   TBILIRUBIN  10.2*  9.5*  8.8*     Active Problems:    Hyperbilirubinemia POA: Yes    Alcoholic cirrhosis of liver with ascites (CMS-HCC) POA: Yes    Sepsis (CMS-HCC) POA: Yes    Abdominal pain POA: Yes    Urinary tract infection POA: Yes    Thrombocytopenia (CMS-HCC) POA: Yes      Alcoholic hepatitis POA: Yes    Excoriation of buttock POA: Yes    Elevated troponin POA: Yes    Acute kidney injury (CMS-HCC) POA: Yes  Resolved Problems:    * No resolved hospital problems. *      Assessment/Plan     Hyperbilirubinemia (present on admission)  Assessment & Plan  T. Bili elevated at 10.  50% of the elevation is indirect bilirubin and 50% is direct bilirubin indicating intrahepatic process.  Likely secondary to alcoholic and chronic hepatitis C related cirrhosis.  However given ultrasound of abdomen revealing GB sludge and thickened gallbladder wall, there may biliary duct obstruction as well.  Will continue to monitor.  Unable to tolerate MRI  Will get CT for further evaluation    Alcoholic cirrhosis of liver with ascites (CMS-HCC) (present on admission)  Assessment & Plan  Likely alcohol related from chronic alcohol abuse. Although hepatitis C could be still be possible.  Last drink was reportedly a month ago, so out of range for withdrawal.  Cirrhosis was identified on ultrasound.  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  Poor prognosis.    Sepsis (CMS-HCC) (present on admission)  Assessment &  Plan  Source of infection is likely UTI.  Blood cultures identified pan sensitive Ecoli 2/2 bottles but urine cultures are pending.  If urine cultures grow a different organism then another possible source is the gallbladder.  Will wait for further culture results.  Although lactic acid is elevated it could be type 2 lactic acidosis from liver failure.  Will monitor patient clinically.  Continue with IV maintenance fluids.  On zosyn -> switched to Unasyn for coverage of potentially both sources currently.      Abdominal pain (present on admission)  Assessment & Plan  Unclear as the source of abdominal pain.  Initially cholecystitis was thought to be the etiology based on imaging findings but SBP could be a possibility too.  GERD or alcoholic gastritis could be a possibility as well.  Will get MRCP to look for gallstones.  Currently on zosyn, switched to Unasyn for empiric coverage of abdominal source of infection.    Urinary tract infection (present on admission)  Assessment & Plan  UA revealed the presence of a UTI.    Thrombocytopenia (CMS-HCC) (present on admission)  Assessment & Plan  Likely secondary to alcoholic liver disease or sepsis.  TEG study reveals a near normal coagulable state.  Continue to monitor.    Alcoholic hepatitis (present on admission)  Assessment & Plan  Patient's liver enzymes elevated but patient's last drink was a month ago.  Could be having alcoholic hepatitis since a month.  INR is elevated and T. Bili is at 10.  Maddrey's discriminant function is 60.6 which indicates steroid use to reduce mortality but it is unclear how much bilirubin is from the hepatitis vs biliary obstruction.  In any case starting steroids is contraindicated as patient is septic.    Excoriation of buttock (present on admission)  Assessment & Plan  Etiology from being bed bound.  Is erythematous and oozing.  Received one dose of vancomycin and then switched over to doxycycline for a day.  Doesn't appear to need  antibiotics.  Wound care consult in place.    Elevated troponin (present on admission)  Assessment & Plan  Troponins elevated at 0.07 and repeat measurement was at the same.  EKG was negative for ischemic changes.  Likely secondary to demand ischemia.  Monitor for cardiac symptoms.    Acute kidney injury (CMS-HCC) (present on admission)  Assessment & Plan  Likely pre-renal in origin.  Improving with IV fluids.  Continue maintenance IV fluids.        Consultants/Specialty  Surgery: Dr. Garibay    Disposition  Remain inpatient for further managment

## 2017-06-23 NOTE — PROGRESS NOTES
Received bedside report from PM nurse. Assumed patient care. Chart reviewed. Pt was resting in bed. A&O x 1. Patient denies pain. Incontinent of stool, large, watery, brown. Firm distended abdomen. POC updated with pt and on the patient communication board. Bed locked and in the lowest position. Call light within reach. Tele box on. Will continue to monitor.

## 2017-06-23 NOTE — PROGRESS NOTES
Received call from MRI tech stating pt cannot tolerate MRI at this time; she is not lying still and cannot keep arms above head for duration of scan. Will notify physician.

## 2017-06-23 NOTE — THERAPY
"83 y/o female adm for abd pain dx: alcoholic cirrhosis of liver with ascites, hyperlbilirubinemia, UTI, thrombocytopenia, BENNETT. Pt is confused and perseverated on wanting to go home. Acute PT to address generalized weakness, altered balance and tolerance to activity to progress towards a higher level of function.    Physical Therapy Evaluation completed.   Bed Mobility:  Supine to Sit: Moderate Assist  Transfers: Sit to Stand: Unable to Participate  Gait: Level Of Assist: Unable to Participate with Front-Wheel Walker       Plan of Care: Will benefit from Physical Therapy 3 times per week  Discharge Recommendations: Equipment: Will Continue to Assess for Equipment Needs. Post-acute therapy Discharge to a transitional care facility for continued skilled therapy services.    See \"Rehab Therapy-Acute\" Patient Summary Report for complete documentation.     "

## 2017-06-23 NOTE — THERAPY
"Speech Language Therapy dysphagia treatment completed.   Functional Status:  Fxnl alertness for current diet, however max cues required to take only sm amts.  Pt perseverating re wanting to go home.  Reasoning impaired.    Recommendations: Continue current D2/thin liquid diet with cues by staff to eat.   Plan of Care: Will benefit from Speech Therapy 2 times per week  Post-Acute Therapy: Discharge to a transitional care facility for continued skilled therapy services.    See \"Rehab Therapy-Acute\" Patient Summary Report for complete documentation.     "

## 2017-06-23 NOTE — PROGRESS NOTES
"  Trauma/Surgical Progress Note    Author: Landon Garibay Date & Time created: 6/23/2017   3:33 PM     Interval Events:    Denies abdominal pain.  Only complaint is buttock pain  No nausea or vomiting  Wt up > 10 kg from admission / I  I &O  + 2.5 L  Increased abdominal distension / + fluid wave  No surgical indication at this time    Review of Systems   Constitutional: Negative for chills.   Eyes: Negative for double vision.   Cardiovascular: Negative for chest pain.   Gastrointestinal: Negative for nausea and abdominal pain.   Musculoskeletal: Negative for myalgias.   Skin: Negative for rash.   Neurological: Negative for headaches.     Hemodynamics:  Blood pressure 166/54, pulse 98, temperature 36.2 °C (97.2 °F), resp. rate 20, height 1.6 m (5' 3\"), weight 72.2 kg (159 lb 2.8 oz), SpO2 97 %.     Respiratory:    Respiration: 20, Pulse Oximetry: 97 %        RUL Breath Sounds: Diminished, RML Breath Sounds: Diminished, RLL Breath Sounds: Diminished, SARAH Breath Sounds: Diminished, LLL Breath Sounds: Diminished  Fluids:    Intake/Output Summary (Last 24 hours) at 06/23/17 1533  Last data filed at 06/23/17 0924   Gross per 24 hour   Intake   4470 ml   Output   1550 ml   Net   2920 ml     Admit Weight: 61.236 kg (135 lb)  Current Weight: 72.2 kg (159 lb 2.8 oz)    Physical Exam   Constitutional: No distress.   HENT:   Head: Normocephalic.   Neck: No JVD present. No tracheal deviation present.   Cardiovascular: Normal rate.    Pulmonary/Chest: Effort normal. No respiratory distress.   Abdominal: Soft. Bowel sounds are normal. She exhibits distension. There is no tenderness. There is no guarding.   + fluid wave   Neurological: She is alert.   Skin: Skin is warm and dry.   Nursing note and vitals reviewed.      Medical Decision Making/Problem List:    Active Hospital Problems    Diagnosis   • Sepsis (CMS-HCC) [A41.9]     Priority: High   • Hyperbilirubinemia [E80.6]     Priority: High   • Alcoholic cirrhosis of liver " with ascites (CMS-HCC) [K70.31]     Priority: High     6/21 - Liver nodular in appearance on U/S / + small ascites  Elevated ammonia / INR> 2  Rocio - Hutton score 12  (82% perioperative mortality)  MELD score 26 ( 56% perioperative mortality)  TEG with platelet mapping completed : low MA- no active bleeding  MR abdomen pending     • Elevated troponin [R74.8]     Priority: Medium   • Abdominal pain [R10.9]     Priority: Medium   • Urinary tract infection [N39.0]     Priority: Medium   • Thrombocytopenia (CMS-HCC) [D69.6]     Priority: Medium     Plt 92  Follow     • Alcoholic hepatitis [K70.10]     Priority: Medium   • Excoriation of buttock [S30.810A]     Priority: Medium   • Acute kidney injury (CMS-HCC) [N17.9]     Priority: Low     Core Measures & Quality Metrics:  Labs reviewed, Medications reviewed and Radiology images reviewed        DVT Prophylaxis: Contraindicated - High bleeding risk  DVT prophylaxis - mechanical: SCDs  Ulcer prophylaxis: Yes  Antibiotics: Treating active infection/contamination beyond 24 hours perioperative coverage      CELESTE Score  Discussed patient condition with Family, RN and Patient.

## 2017-06-23 NOTE — PROGRESS NOTES
Report received at bedside, assumed care of patient. Patient A&O x 2, no signs of distress noted. All LDAs assessed. Discussed POC with patient and all questions answered at this time. Denies pain at this time. Bed alarm on. Bed in lowest position, upper side rails up. Non-skid socks in place. Call light within reach. Personal possessions in reach. Will continue to monitor pt status.

## 2017-06-24 PROBLEM — B96.20 BACTEREMIA DUE TO ESCHERICHIA COLI: Status: ACTIVE | Noted: 2017-01-01

## 2017-06-24 PROBLEM — R78.81 BACTEREMIA DUE TO ESCHERICHIA COLI: Status: ACTIVE | Noted: 2017-01-01

## 2017-06-24 PROBLEM — B18.2 CHRONIC HEPATITIS C VIRUS INFECTION (HCC): Status: ACTIVE | Noted: 2017-01-01

## 2017-06-24 NOTE — PROGRESS NOTES
[facility] [place] Internal Medicine Interval Note    Name Abigail Bradley     1935   Age/Sex 82 y.o. female   MRN [id number]   Code Status DNAR     After 5PM or if no immediate response to page, please call for cross-coverage  Attending/Team: Dr. Hinson/Melchor Use Tiger Text to page  6AM-5PM  Call [phone] to page afterhours   1st Call - Day Intern (R1):   Dr. Diaz 2nd Call - Day Sr. Resident (R2/R3):   Dr. Addison         Chief complaint/ reason for interval visit (Primary Diagnosis)   Fatigue; Abdominal pain; UTI; Sepsis    Interval Problem Daily Status Update    Repeat blood cultures are ordered and pending. Patient reports to be doing a lot better since admission and is requesting to go home. Advised her that she is still undergoing active treatment. PT/OT also recommended discharge to transitional care facility prior to discharge home. Updated patient on plan of care.      Review of Systems   Constitutional: Positive for malaise/fatigue. Negative for fever and chills.   HENT: Negative for congestion and sore throat.    Eyes: Negative for blurred vision and double vision.   Respiratory: Negative for cough and shortness of breath.    Cardiovascular: Negative for chest pain and palpitations.   Gastrointestinal: Negative for nausea, vomiting and abdominal pain.   Genitourinary: Negative for dysuria and urgency.   Musculoskeletal: Negative for back pain and falls.   Skin: Negative for rash.   Neurological: Positive for weakness. Negative for tremors, focal weakness and headaches.   Psychiatric/Behavioral: Negative for depression and suicidal ideas.         Quality Measures  EKG reviewed, Labs reviewed, Medications reviewed and Radiology images reviewed  العراقي catheter: Critically Ill - Requiring Accurate Measurement of Urinary Output      DVT Prophylaxis: Contraindicated - High bleeding risk  DVT prophylaxis - mechanical: SCDs  Ulcer prophylaxis: Yes  Antibiotics: Treating active  infection/contamination beyond 24 hours perioperative coverage            Physical Exam       Filed Vitals:    06/24/17 0000 06/24/17 0400 06/24/17 0800 06/24/17 1114   BP: 125/66 150/62 148/52 123/65   Pulse: 80 82 82 83   Temp: 36.2 °C (97.1 °F) 36.6 °C (97.8 °F) 36.1 °C (97 °F) 36.1 °C (97 °F)   Resp: 15 18 17    Height:       Weight:       SpO2: 92% 95% 94% 95%     Body mass index is 29.02 kg/(m^2). Weight: 74.3 kg (163 lb 12.8 oz)  Oxygen Therapy:  Pulse Oximetry: 95 %, O2 (LPM): 0, O2 Delivery: None (Room Air)    Physical Exam   Constitutional: She is oriented to person, place, and time. She appears unhealthy.   HENT:   Head: Normocephalic and atraumatic.   Eyes: EOM are normal. Pupils are equal, round, and reactive to light.   Cardiovascular: Normal rate, regular rhythm and normal heart sounds.    Pulmonary/Chest: Effort normal. She has no wheezes. She has no rales.   Abdominal: Soft. Bowel sounds are normal. She exhibits distension. There is tenderness. There is no rebound and no guarding.   Neurological: She is alert and oriented to person, place, and time. GCS score is 15.         Lab Data Review:      6/22/2017  12:36 PM    Recent Labs      06/22/17 0313 06/23/17 0219 06/24/17   0320   SODIUM  141  141  139   POTASSIUM  3.5*  3.6  3.4*   CHLORIDE  109  113*  110   CO2  27 24 22   BUN  63*  48*  42*   CREATININE  1.21  1.15  1.06   MAGNESIUM  2.1   --    --    CALCIUM  8.5  8.0*  8.0*       Recent Labs      06/22/17 0313 06/23/17   0219 06/24/17   0320   ALTSGPT  136*  126*  127*   ASTSGOT  136*  133*  132*   ALKPHOSPHAT  111*  114*  120*   TBILIRUBIN  9.5*  8.8*  8.6*   GLUCOSE  106*  86  90       Recent Labs      06/22/17 0313 06/23/17 0219 06/24/17   0320   RBC  3.26*  3.28*   --    HEMOGLOBIN  10.8*  10.8*   --    HEMATOCRIT  35.0*  35.7*   --    PLATELETCT  83*  76*   --    PROTHROMBTM  26.2*  25.9*  24.8*   INR  2.32*  2.29*  2.16*       Recent Labs      06/22/17   0313  06/23/17    0219  06/24/17   0320   WBC  9.2  8.4   --    NEUTSPOLYS  81.20*   --    --    LYMPHOCYTES  2.60*   --    --    MONOCYTES  11.10   --    --    EOSINOPHILS  2.50   --    --    BASOPHILS  0.00   --    --    ASTSGOT  136*  133*  132*   ALTSGPT  136*  126*  127*   ALKPHOSPHAT  111*  114*  120*   TBILIRUBIN  9.5*  8.8*  8.6*     Active Problems:    Hyperbilirubinemia POA: Yes    Alcoholic cirrhosis of liver with ascites (CMS-HCC) POA: Yes    Sepsis (CMS-HCC) POA: Yes    Abdominal pain POA: Yes    Urinary tract infection POA: Yes    Thrombocytopenia (CMS-HCC) POA: Yes      Alcoholic hepatitis POA: Yes    Excoriation of buttock POA: Yes    Elevated troponin POA: Yes    Acute kidney injury (CMS-HCC) POA: Yes  Resolved Problems:    * No resolved hospital problems. *      Assessment/Plan     Hyperbilirubinemia (present on admission)  Assessment & Plan  T. Bili elevated at 10.  50% of the elevation is indirect bilirubin and 50% is direct bilirubin indicating intrahepatic process.  Likely secondary to alcoholic and chronic hepatitis C related cirrhosis.  However given ultrasound of abdomen revealing GB sludge and thickened gallbladder wall, there may biliary duct obstruction as well.  Will continue to monitor.  Unable to tolerate MRI  Will get CT for further evaluation    Alcoholic cirrhosis of liver with ascites (CMS-HCC) (present on admission)  Assessment & Plan  Likely alcohol related from chronic alcohol abuse. Although hepatitis C could be still be possible.  Last drink was reportedly a month ago, so out of range for withdrawal.  Cirrhosis was identified on ultrasound.  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  Poor prognosis.    Sepsis (CMS-HCC) (present on admission)  Assessment & Plan  Source of infection is likely UTI.  Blood cultures identified pan sensitive Ecoli 2/2 bottles but urine cultures are pending.  If urine cultures grow a different organism then another possible  source is the gallbladder.  Will wait for further culture results.  Although lactic acid is elevated it could be type 2 lactic acidosis from liver failure.  Will monitor patient clinically.  Continue with IV maintenance fluids.  On zosyn -> switched to Unasyn for coverage of potentially both sources currently.      Abdominal pain (present on admission)  Assessment & Plan  Unclear as the source of abdominal pain.  Initially cholecystitis was thought to be the etiology based on imaging findings but SBP could be a possibility too.  GERD or alcoholic gastritis could be a possibility as well.  Will get MRCP to look for gallstones.  Currently on zosyn, switched to Unasyn for empiric coverage of abdominal source of infection.    Urinary tract infection (present on admission)  Assessment & Plan  UA revealed the presence of a UTI.    Thrombocytopenia (CMS-HCC) (present on admission)  Assessment & Plan  Likely secondary to alcoholic liver disease or sepsis.  TEG study reveals a near normal coagulable state.  Continue to monitor.    Alcoholic hepatitis (present on admission)  Assessment & Plan  Patient's liver enzymes elevated but patient's last drink was a month ago.  Could be having alcoholic hepatitis since a month.  INR is elevated and T. Bili is at 10.  Maddrey's discriminant function is 60.6 which indicates steroid use to reduce mortality but it is unclear how much bilirubin is from the hepatitis vs biliary obstruction.  In any case starting steroids is contraindicated as patient is septic.    Excoriation of buttock (present on admission)  Assessment & Plan  Etiology from being bed bound.  Is erythematous and oozing.  Received one dose of vancomycin and then switched over to doxycycline for a day.  Doesn't appear to need antibiotics.  Wound care consult in place.    Elevated troponin (present on admission)  Assessment & Plan  Troponins elevated at 0.07 and repeat measurement was at the same.  EKG was negative for  ischemic changes.  Likely secondary to demand ischemia.  Monitor for cardiac symptoms.    Acute kidney injury (CMS-HCC) (present on admission)  Assessment & Plan  Likely pre-renal in origin.  Improving with IV fluids.  Continue maintenance IV fluids.        Consultants/Specialty  Surgery: Dr. Garibay    Disposition  Remain inpatient for further managment

## 2017-06-24 NOTE — PROGRESS NOTES
"  Trauma/Surgical Progress Note    Author: Camille Martinez Date & Time created: 6/24/2017   9:33 AM     Interval Events:    Denies pain. Feels better. Wishes to go home soon.  Tolerating oral diet.  Consistent fluid gain of approximately 3 L per day since admit   CT of abdomen with large amount of ascites  No indications for surgical needs / intervention  UTI  / possible SBP / with bacteremia  Will sign off / Contact me if the situation changes    Review of Systems   Constitutional: Negative for fever and malaise/fatigue.   HENT: Negative.    Eyes: Negative.  Negative for blurred vision and double vision.   Respiratory: Negative.  Negative for shortness of breath.    Cardiovascular: Negative.  Negative for chest pain and leg swelling.   Gastrointestinal: Negative.  Negative for nausea and abdominal pain.        BM 6/24   Genitourinary:        العراقي   Musculoskeletal: Negative for myalgias.   Skin: Negative.    Neurological: Negative.  Negative for dizziness, sensory change, focal weakness and headaches.   Psychiatric/Behavioral: Negative.      Hemodynamics:  Blood pressure 150/62, pulse 82, temperature 36.6 °C (97.8 °F), resp. rate 18, height 1.6 m (5' 3\"), weight 74.3 kg (163 lb 12.8 oz), SpO2 95 %.     Respiratory:    Respiration: 18, Pulse Oximetry: 95 %        RUL Breath Sounds: Diminished, RML Breath Sounds: Diminished, RLL Breath Sounds: Diminished, SARAH Breath Sounds: Diminished, LLL Breath Sounds: Diminished  Fluids:    Intake/Output Summary (Last 24 hours) at 06/24/17 0933  Last data filed at 06/24/17 0600   Gross per 24 hour   Intake   3590 ml   Output    775 ml   Net   2815 ml     Admit Weight: 61.236 kg (135 lb)  Current Weight: 74.3 kg (163 lb 12.8 oz)    Physical Exam   Constitutional: No distress.   HENT:   Head: Normocephalic.   Neck: No JVD present. No tracheal deviation present.   Cardiovascular: Normal rate.    Pulmonary/Chest: Effort normal. No respiratory distress.   Abdominal: Soft. Bowel sounds " are normal. She exhibits distension. There is no tenderness. There is no guarding.   Round nontender abdomen    Musculoskeletal: She exhibits no tenderness.   Neurological: She is alert.   Skin: Skin is warm and dry.   Psychiatric: She has a normal mood and affect.   Nursing note and vitals reviewed.      Medical Decision Making/Problem List:    Active Hospital Problems    Diagnosis   • Sepsis (CMS-HCC) [A41.9]     Priority: High   • Hyperbilirubinemia [E80.6]     Priority: High   • Alcoholic cirrhosis of liver with ascites (CMS-HCC) [K70.31]     Priority: High     6/21 - Liver nodular in appearance on U/S / + small ascites  Elevated ammonia / INR> 2  Rocio - Hutton score 12  (82% perioperative mortality)  MELD score 26 ( 56% perioperative mortality)  TEG with platelet mapping completed : low MA- no active bleeding  MR abdomen pending  6/23 - CT of abdomen demonstrating hepatocellular disease/cirrhosis. Cysts and low density lesions within liver. Cyst and low density nodule in the head of the pancreas     • Elevated troponin [R74.8]     Priority: Medium   • Abdominal pain [R10.9]     Priority: Medium   • Urinary tract infection [N39.0]     Priority: Medium   • Thrombocytopenia (CMS-HCC) [D69.6]     Priority: Medium     Plt 92  Follow     • Alcoholic hepatitis [K70.10]     Priority: Medium   • Excoriation of buttock [S30.810A]     Priority: Medium   • Acute kidney injury (CMS-HCC) [N17.9]     Priority: Low     Core Measures & Quality Metrics:  Labs reviewed, Medications reviewed and Radiology images reviewed  العراقي catheter: Strict Intake and Output During Sepisis or Shock      DVT Prophylaxis: Contraindicated - High bleeding risk  DVT prophylaxis - mechanical: SCDs  Ulcer prophylaxis: Yes  Antibiotics: Treating active infection/contamination beyond 24 hours perioperative coverage      CELESTE Score  Discussed patient condition with RN, Patient and general surgery Dr Landon Garibay.    Patient seen, data reviewed and  discussed.  Agree with assessment and plan.  HARMAN

## 2017-06-25 NOTE — PROGRESS NOTES
[facility] [place] [facility] Interval Note    Name Abigail Bradley     1935   Age/Sex 82 y.o. female   MRN [id number]   Code Status DNAR     After 5PM or if no immediate response to page, please call for cross-coverage  Attending/Team: Dr. Hinson/Melchor Use Tiger Text to page  6AM-5PM  Call [phone] to page afterhours   1st Call - Day Intern (R1):   Dr. Diaz 2nd Call - Day Sr. Resident (R2/R3):   Dr. Addison         Chief complaint/ reason for interval visit (Primary Diagnosis)   Fatigue; Abdominal pain; UTI; Sepsis    Interval Problem Daily Status Update    Continuing treatment of sepsis with IV unasyn. Patient is showing elevated wbc, but no other signs of infections. She is clinically much improved. Her S.creatinine is however elevated, suspect pre-renal etiology of hypovolemia. Will evaluate with labs.      Review of Systems   Constitutional: Positive for malaise/fatigue. Negative for fever and chills.   HENT: Negative for congestion and sore throat.    Eyes: Negative for blurred vision and double vision.   Respiratory: Negative for cough and shortness of breath.    Cardiovascular: Negative for chest pain and palpitations.   Gastrointestinal: Negative for nausea, vomiting and abdominal pain.   Genitourinary: Negative for dysuria and urgency.   Musculoskeletal: Negative for back pain and falls.   Skin: Negative for rash.   Neurological: Positive for weakness. Negative for tremors, focal weakness and headaches.   Psychiatric/Behavioral: Negative for depression and suicidal ideas.         Quality Measures  EKG reviewed, Labs reviewed, Medications reviewed and Radiology images reviewed  العراقي catheter: Critically Ill - Requiring Accurate Measurement of Urinary Output      DVT Prophylaxis: Contraindicated - High bleeding risk  DVT prophylaxis - mechanical: SCDs  Ulcer prophylaxis: Yes  Antibiotics: Treating active infection/contamination beyond 24 hours perioperative  coverage            Physical Exam       Filed Vitals:    06/24/17 2000 06/25/17 0000 06/25/17 0400 06/25/17 0732   BP: 102/38 171/75 133/65 152/76   Pulse: 83  89 82   Temp: 36.1 °C (96.9 °F)  35.9 °C (96.7 °F) 36.2 °C (97.1 °F)   Resp: 16  17 18   Height:       Weight: 78.9 kg (173 lb 15.1 oz)      SpO2: 95%  93% 96%     Body mass index is 30.82 kg/(m^2). Weight: 78.9 kg (173 lb 15.1 oz)  Oxygen Therapy:  Pulse Oximetry: 96 %, O2 (LPM): 0, O2 Delivery: None (Room Air)    Physical Exam   Constitutional: She is oriented to person, place, and time. She appears unhealthy.   HENT:   Head: Normocephalic and atraumatic.   Eyes: EOM are normal. Pupils are equal, round, and reactive to light.   Cardiovascular: Normal rate, regular rhythm and normal heart sounds.    Pulmonary/Chest: Effort normal. She has no wheezes. She has no rales.   Abdominal: Soft. Bowel sounds are normal. She exhibits distension. There is no tenderness. There is no rebound and no guarding.   Neurological: She is alert and oriented to person, place, and time. GCS score is 15.         Lab Data Review:      6/22/2017  12:36 PM    Recent Labs      06/23/17 0219 06/24/17 0320 06/25/17   0233   SODIUM  141  139  134*   POTASSIUM  3.6  3.4*  4.1   CHLORIDE  113*  110  108   CO2  24  22  21   BUN  48*  42*  48*   CREATININE  1.15  1.06  1.51*   CALCIUM  8.0*  8.0*  8.0*       Recent Labs      06/23/17 0219 06/24/17 0320  06/25/17   0233   ALTSGPT  126*  127*  119*   ASTSGOT  133*  132*  114*   ALKPHOSPHAT  114*  120*  103*   TBILIRUBIN  8.8*  8.6*  8.1*   GLUCOSE  86  90  94       Recent Labs      06/23/17 0219 06/24/17 0320  06/25/17   0233   RBC  3.28*   --   3.13*   HEMOGLOBIN  10.8*   --   10.3*   HEMATOCRIT  35.7*   --   33.0*   PLATELETCT  76*   --   99*   PROTHROMBTM  25.9*  24.8*   --    INR  2.29*  2.16*   --        Recent Labs      06/23/17   0219  06/24/17   0320  06/25/17   0233   WBC  8.4   --   11.5*   NEUTSPOLYS   --    --    80.30*   LYMPHOCYTES   --    --   5.40*   MONOCYTES   --    --   10.70   EOSINOPHILS   --    --   2.70   BASOPHILS   --    --   0.00   ASTSGOT  133*  132*  114*   ALTSGPT  126*  127*  119*   ALKPHOSPHAT  114*  120*  103*   TBILIRUBIN  8.8*  8.6*  8.1*     Active Problems:    Hyperbilirubinemia POA: Yes    Alcoholic cirrhosis of liver with ascites (CMS-HCC) POA: Yes    Sepsis (CMS-HCC) POA: Yes    Abdominal pain POA: Yes    Urinary tract infection POA: Yes    Thrombocytopenia (CMS-HCC) POA: Yes      Alcoholic hepatitis POA: Yes    Excoriation of buttock POA: Yes    Elevated troponin POA: Yes    Acute kidney injury (CMS-HCC) POA: Yes  Resolved Problems:    * No resolved hospital problems. *      Assessment/Plan     Alcoholic cirrhosis of liver with ascites (CMS-HCC)  Likely alcohol related from chronic alcohol abuse. Although hepatitis C could be still be possible.  Last drink was reportedly a month ago, so out of range for withdrawal.  Cirrhosis was identified on ultrasound.  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  Poor prognosis.    Alcoholic hepatitis  Patient's liver enzymes elevated but patient's last drink was a month ago.  Could be having alcoholic hepatitis since a month.  INR is elevated and T. Bili is at 10.  Maddrey's discriminant function is 60.6 which indicates steroid use to reduce mortality but it is unclear how much bilirubin is from the hepatitis vs biliary obstruction.  In any case starting steroids is contraindicated as patient is septic.    Hyperbilirubinemia  T. Bili elevated at 10.  50% of the elevation is indirect bilirubin and 50% is direct bilirubin indicating intrahepatic process.  Likely secondary to alcoholic vs viral hepatitis and/or cirrhosis. However given ultrasound of abdomen revealing GB sludge and thickened gallbladder wall, there may biliary duct obstruction as well.  Will continue to monitor.  MRCP is pending.    Abdominal  pain  Unclear as the source of abdominal pain.  Initially cholecystitis was thought to be the etiology based on imaging findings but SBP could be a possibility too.  GERD or alcoholic gastritis could be a possibility as well.  Resolved currently.    Urinary tract infection  UA revealed the presence of a UTI.  Currently has zosyn for coverage.  Urine cultures pending.    Thrombocytopenia (CMS-HCC)  Likely secondary to alcoholic liver disease or sepsis.  TEG study reveals a near normal coagulable state.  Continue to monitor.    Excoriation of buttock  Etiology from being bed bound.  Is erythematous and oozing.  Received one dose of vancomycin and then switched over to doxycycline for a day.  Doesn't appear to need antibiotics.  Wound care consult in place.    Acute kidney injury (CMS-[facility])  S. Creatinine which improved with IV fluids is again elevated today.  Suspect pre-renal hypovolemia given recent bowel movements.  Another possibility is hepato-renal syndrome.  Will give IV fluid bolus and assess response.  FeNa is 0.11% showing pre-renal.   Urine eosonophils are pending.    Elevated troponin  Troponins elevated at 0.07 and repeat measurement was at the same.  EKG was negative for ischemic changes.  Likely secondary to demand ischemia.  Monitor for cardiac symptoms.    Sepsis (CMS-[facility])  Source of infection is likely UTI.  Blood cultures identified E.coloi in 2/2 bottles but urine cultures are non-specific but showing enteric organisms.  Although lactic acid is elevated it could be type 2 lactic acidosis from liver failure.  Continue with IV maintenance fluids.  Continue with unasyn.            Consultants/Specialty  Surgery: Dr. Garibay    Disposition  Remain inpatient for further managment

## 2017-06-25 NOTE — PROGRESS NOTES
Received pt from tele 7 at 1330 into 195-2. Accompanied by spouse. Oriented both to room and call light and routine. Medications checked none due now.  Pt up to BSC and then to chair with therapy. Medicated with zanax and assisted . back bed prior to MRI and pt was able to help more with this transfer then earlier. Flat affect. Abd large round distended and semi firm, did not complain of pain when touched. Pt remains jaundiced but spouse says she looks better then yesterday.

## 2017-06-25 NOTE — PROGRESS NOTES
Spoke to on call physician Dr. Stark about pt skin issues and frequent incontinence to stool along with the doses of lactulose. Received orders to insert rectal tube.

## 2017-06-25 NOTE — PROGRESS NOTES
Report to nurse who will be resuming care for pt on med surg. Reported, not enough urine to send for a UA at this time.

## 2017-06-25 NOTE — THERAPY
"Occupational Therapy Evaluation completed.   Functional Status:   total assist with ADLs and Max A with mobility.  Plan of Care: Will benefit from Occupational Therapy 3 times per week  Discharge Recommendations:  Equipment: Will Continue to Assess for Equipment Needs. Post-acute therapy Discharge to a transitional care facility for continued skilled therapy services.    Patient presents s/p BENNETT, ascending cholengitis, and sepsis. Spouse present t/o eval and assist with PLOF and PTA and current status clarification. Patient and spouse report I with ADLs, IADLs, and mobility PTA. Upon eval, presents with decreased cogntion, strength, balance, endurance, tolerance, and ADL participation necessitating total assist with ADLs and Max A with mobility. CNA SBA if needed for safety 2/2 patient tendency to have uncontrolled descent unsafely to chair/BSC. Patient would benefit from skilled OT in this setting followed by rehab prior to DC home with spouse and services. x3 week.     See \"Rehab Therapy-Acute\" Patient Summary Report for complete documentation.    "

## 2017-06-26 NOTE — PROGRESS NOTES
Pt has received over 1500mls of fluids with less than 20mls of  urine output. MD called. Bladder scan showed over 1,000mls Order received to replace jarrett. Jarrett replaced still less than 10 output. Attempted to irrigate small blood and clots out very little urine MD at bedside. Dr believes bladder scan is showing ascites not accurate bladder rentention. Pt having black mucous stool, MD to see. Order received to run stool for occult. Sample sent. Will monitor.

## 2017-06-26 NOTE — PROGRESS NOTES
Pt AAOx 1-2. Follows commands at this time. Q2 turns in place. Hourly rounding in place. Call light within reach.

## 2017-06-26 NOTE — WOUND TEAM
Wound team consult placed regarding pt's buttocks.  Spoke with RN and instructed to continue with current dressing POC.  After observing photos, Pt's buttocks and erma areas appear to have improved since initial assessment.  Wound team to continue following Pt to assess for any changes needed in POC.

## 2017-06-26 NOTE — THERAPY
"Speech Language Therapy dysphagia treatment completed.   Functional Status:  Tolerating modified diet. Functional swallow for diet upgrade.    Recommendations: Recommend diet upgrade to dysphagia 3. Continue thin liquids. Hold with any difficulty.    Plan of Care: Will benefit from Speech Therapy 3 times per week  Post-Acute Therapy: Discharge to home with outpatient or home health for additional skilled therapy services.    See \"Rehab Therapy-Acute\" Patient Summary Report for complete documentation.     "

## 2017-06-26 NOTE — PROGRESS NOTES
Patient lying in bed, eyes open, in no apparent distress. Patient is oriented to self only. Patient has flat affect, reports no pain, or needs at this time. Patient turned every 2 hours, dressing changes ordered for buttocks. Side rails up X2, bed alarm in place. Will continue to monitor.

## 2017-06-26 NOTE — PROGRESS NOTES
Lab called attending RN to report urine sent for urinalysis was an inadequate amount. According to lab, they need a minimum of 20ml of urine. Attending RN relayed to lab that patient is producing a very minimal amount of urine and it may be close to 6 am before patient has produced enough urine

## 2017-06-27 PROBLEM — K92.2 UPPER GI BLEED: Status: ACTIVE | Noted: 2017-01-01

## 2017-06-27 NOTE — PROGRESS NOTES
Assessment completed. Patient A&O x 2, pt oriented to self and she does know she is in the hospital but cannot give specifics when asked. Pt has no complaints of pain at this time. POC discussed with pt and . Call light & bedside table within reach. Bed locked and in lowest position. Bed alarm on and fall precautions in place. Hourly rounding in place.

## 2017-06-27 NOTE — ASSESSMENT & PLAN NOTE
Patient noted to have black stools by RN.  Occult stool positive.  Vitals are stable.  Serial H/Hs show minimal drop in Hb but has been stable.  Most likely is having an occult bleed.  Gastroenterology following patient, appreciate recommendations.

## 2017-06-27 NOTE — PROGRESS NOTES
Assumed care at this time. Report received from ZAK Jay. Pt laying in bed awake, confused, denies pain at this time. العراقي in place with blood in urine. Bed alarm on, side rails up x2, call light within reach, nonslip footwear on, bed locked/ placed in lowest position, hourly rounding in place. Will continue to monitor.

## 2017-06-27 NOTE — PROGRESS NOTES
2 RN skin check completed with ZAK Art. Pt constantly having watery stool, incontinence. Bottom is raw, red/pink, and bleeding when wiped. Skin care provided per existing instructions in orders, with viscotape over ashley cream. Rectal trumpet placed. Wound pic taken upon arriving from neuro. Abd is distended and semifim, bruising noted. IAD to groin, ashley cream in use. BUE bruised and red/pink. BLE 3+ edema with small blisters throughout. Heels red but blanching, placed in heel float boots. Scabbing to R knee. Open wound to L knee that was covered with mepilex. Mepilex removed, wound bed yellow and pink/red. Wound pic taken.   Wound consult not placed as wound RNSheba, on unit and discussed skin care and treatment. Reviewed wound pic.

## 2017-06-27 NOTE — PROGRESS NOTES
[facility] Interval Note    Name Abigail Bradley     1935   Age/Sex 82 y.o. female   MRN [id number]   Code Status DNAR     After 5PM or if no immediate response to page, please call for cross-coverage  Attending/Team: Dr. Ojeda/Melchor Use Tiger Text to page  6AM-5PM  Call [phone] to page afterhours   1st Call - Day Intern (R1):   Dr. Diaz 2nd Call - Day Sr. Resident (R2/R3):   Dr. Guthrie         Chief complaint/ reason for interval visit (Primary Diagnosis)   Fatigue; Abdominal pain; UTI; Sepsis    Interval Problem Daily Status Update    Continuing treatment of sepsis with IV unasyn. She developed BENNETT which is likely pre-renal from intravascular depletion. She is getting fluids but the fluid is moving extravascular into interstial tissue. IV albumin and continued IV fluids are still given to patient. There is a possibility that she could be developing hepatorenal syndrome. Will reassess tomorrow and if renal function is still worsening then nephrology to be consulted.    Ultrasound renal was unremarkable.    Review of Systems   Constitutional: Positive for malaise/fatigue. Negative for fever and chills.   HENT: Negative for congestion and sore throat.    Eyes: Negative for blurred vision and double vision.   Respiratory: Negative for cough and shortness of breath.    Cardiovascular: Negative for chest pain and palpitations.   Gastrointestinal: Negative for nausea, vomiting and abdominal pain.   Genitourinary: Negative for dysuria and urgency.   Musculoskeletal: Negative for back pain and falls.   Skin: Negative for rash.   Neurological: Positive for weakness. Negative for tremors, focal weakness and headaches.   Psychiatric/Behavioral: Negative for depression and suicidal ideas.         Quality Measures  EKG reviewed, Labs reviewed, Medications reviewed and Radiology images reviewed  العراقي catheter: Critically Ill - Requiring Accurate Measurement of  Urinary Output      DVT Prophylaxis: Contraindicated - High bleeding risk  DVT prophylaxis - mechanical: SCDs  Ulcer prophylaxis: Yes  Antibiotics: Treating active infection/contamination beyond 24 hours perioperative coverage            Physical Exam       Filed Vitals:    06/26/17 0000 06/26/17 0400 06/26/17 0746 06/26/17 1600   BP: 148/65 124/63 139/67 160/68   Pulse: 77 79 80 78   Temp: 35.8 °C (96.4 °F) 35.8 °C (96.5 °F) 36.2 °C (97.1 °F) 35.8 °C (96.5 °F)   Resp: 16 16 18 18   Height:       Weight:       SpO2: 97% 95% 94% 99%     Body mass index is 30.82 kg/(m^2).    Oxygen Therapy:  Pulse Oximetry: 99 %, O2 (LPM): 0, O2 Delivery: None (Room Air)    Physical Exam   Constitutional: She is oriented to person, place, and time. She appears unhealthy.   HENT:   Head: Normocephalic and atraumatic.   Eyes: EOM are normal. Pupils are equal, round, and reactive to light.   Cardiovascular: Normal rate, regular rhythm and normal heart sounds.    Pulmonary/Chest: Effort normal. She has no wheezes. She has no rales.   Abdominal: Soft. Bowel sounds are normal. She exhibits distension. There is no tenderness. There is no rebound and no guarding.   Neurological: She is alert and oriented to person, place, and time. GCS score is 15.         Lab Data Review:      6/22/2017  12:36 PM    Recent Labs      06/25/17   0233  06/26/17   0413  06/26/17   1029   SODIUM  134*  131*  130*   POTASSIUM  4.1  4.1  4.3   CHLORIDE  108  106  107   CO2  21  20  17*   BUN  48*  55*  57*   CREATININE  1.51*  2.32*  2.54*   CALCIUM  8.0*  8.3*  8.2*       Recent Labs      06/24/17   0320  06/25/17   0233  06/26/17   0413  06/26/17   1029   ALTSGPT  127*  119*  115*   --    ASTSGOT  132*  114*  116*   --    ALKPHOSPHAT  120*  103*  113*   --    TBILIRUBIN  8.6*  8.1*  8.2*   --    GLUCOSE  90  94  78  89       Recent Labs      06/24/17   0320  06/25/17   0233  06/26/17   0413   RBC   --   3.13*  2.93*   HEMOGLOBIN   --   10.3*  9.6*   HEMATOCRIT    --   33.0*  31.0*   PLATELETCT   --   99*  100*   PROTHROMBTM  24.8*   --   23.7*   INR  2.16*   --   2.04*       Recent Labs      06/24/17   0320  06/25/17   0233  06/26/17   0413   WBC   --   11.5*  12.4*   NEUTSPOLYS   --   80.30*  75.20*   LYMPHOCYTES   --   5.40*  9.40*   MONOCYTES   --   10.70  11.40   EOSINOPHILS   --   2.70  2.70   BASOPHILS   --   0.00  0.40   ASTSGOT  132*  114*  116*   ALTSGPT  127*  119*  115*   ALKPHOSPHAT  120*  103*  113*   TBILIRUBIN  8.6*  8.1*  8.2*     Active Problems:    Hyperbilirubinemia POA: Yes    Alcoholic cirrhosis of liver with ascites (CMS-HCC) POA: Yes    Sepsis (CMS-HCC) POA: Yes    Abdominal pain POA: Yes    Urinary tract infection POA: Yes    Thrombocytopenia (CMS-HCC) POA: Yes      Alcoholic hepatitis POA: Yes    Excoriation of buttock POA: Yes    Elevated troponin POA: Yes    Acute kidney injury (CMS-HCC) POA: Yes  Resolved Problems:    * No resolved hospital problems. *      Assessment/Plan     Alcoholic cirrhosis of liver with ascites (CMS-HCC)  Likely alcohol related from chronic alcohol abuse. Although hepatitis C could be still be possible.  Last drink was reportedly a month ago, so out of range for withdrawal.  Cirrhosis was identified on ultrasound.  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  Poor prognosis.    Alcoholic hepatitis  Patient's liver enzymes elevated but patient's last drink was a month ago.  Could be having alcoholic hepatitis since a month.  INR is elevated and T. Bili is at 10.  Maddrey's discriminant function is 60.6 which indicates steroid use to reduce mortality but it is unclear how much bilirubin is from the hepatitis vs biliary obstruction.  In any case starting steroids is contraindicated as patient is septic.  Pentoxifylline initiated at renal dosing.    Hyperbilirubinemia  T. Bili elevated at 10.  50% of the elevation is indirect bilirubin and 50% is direct bilirubin indicating intrahepatic  process.  Likely secondary to alcoholic vs viral hepatitis and/or cirrhosis. However given ultrasound of abdomen revealing GB sludge and thickened gallbladder wall, there may biliary duct obstruction as well.  Will continue to monitor.  MRCP does not reveal any obstruction.    Abdominal pain  Unclear as the source of abdominal pain.  Initially cholecystitis was thought to be the etiology based on imaging findings but SBP could be a possibility too.  GERD or alcoholic gastritis could be a possibility as well.  Resolved currently.    Urinary tract infection  UA revealed the presence of a UTI.  Currently has unasyn for coverage.  Urine cultures discarded as they were non-specific/    Thrombocytopenia (CMS-HCC)  Likely secondary to alcoholic liver disease or sepsis.  TEG study reveals a near normal coagulable state.  Continue to monitor.    Excoriation of buttock  Etiology from being bed bound.  Is erythematous and oozing.  Received one dose of vancomycin and then switched over to doxycycline for a day.  Doesn't appear to need antibiotics.  Wound care consult in place.    Acute kidney injury (CMS-[facility])  S. Creatinine which improved with IV fluids is again elevated today.  Suspect pre-renal hypovolemia given recent bowel movements.  Another possibility is hepato-renal syndrome.  Will give IV fluids and will give albumin infusion to try to increase the intravascular volume and assess response.  FeNa is 0.11% showing pre-renal.   Urine eosonophils are negative.  Repeat UA and BMP for tomorrow.    Elevated troponin  Troponins elevated at 0.07 and repeat measurement was at the same.  EKG was negative for ischemic changes.  Likely secondary to demand ischemia.  Monitor for cardiac symptoms.    Sepsis (CMS-[facility])  Source of infection is likely UTI.  Blood cultures identified E.coloi in 2/2 bottles but urine cultures are non-specific but showing enteric organisms.  Although lactic acid is elevated it could be type 2 lactic  acidosis from liver failure.  Continue with IV maintenance fluids.  Continue with unasyn.            Consultants/Specialty  Surgery: Dr. Garibay    Disposition  Remain inpatient for further managment

## 2017-06-27 NOTE — PROGRESS NOTES
Dr. Garza paged and made aware of positive occult blood. No orders received. MD also aware pt urine output less than 20 in over 12 hours. No orders.

## 2017-06-27 NOTE — PROGRESS NOTES
GI Consults  Re: melena    81yo female with presumed alcoholic liver disease is confused and unable to give a hx.  Hx per chart.  She stopped drinking 1 mo ago due to not feeling well.  Presented to ED with abd pain, jaundice, nausea and increasing abd distention.  Noted to have thrombocytopenia, coagulopathy, macrocytic anemia and elevated liver tests not consistent with obstructive jaundice.  Mild azotemia also noted with BUN 84 and Cr 1.31.  US confirmed cirrhosis and ascites and no ductal dilation.  Has undergone CT abd/pel with contrast and MRCP.  Now with BENNETT and decreased urine output.      Impression:  1.  Decompensated cirrhosis MELD 35: presumed alcohol and HCV Ab pos (chronic HCV not confirmed yet).  ?ETOH hepatitis as AST:ALT ratio not >2  2.  Abd pain:  ?SBP, lumenal cause, ETOH hep.  No ductal dilation, no pericholecystic fluid, no stones.  Currently denies pain  3.  Melena x 1 yesterday.  Noted to have gastroesophageal varices on CT.  4.  Ascites, large  4.  BENNETT.  Nephrology seeing patient.    5.  Confusion: ? HE although NH3 nl  6.  Abnormal liver tests:  Bilirubin 10 on adm and now 8  7.  Thrombocytopenia  8.  Coagulopathy  9.  Macrocytic anemia on adm c/w liver disease  10.  Anemia, acute blood loss, mild  11.  Small left lobe liver lesion x 2.  Nl AFP  12.  Pancreatic cyst and lesion head of pancreas  13.  ?Annular pancreas    Recs:  1.  Diagnostic paracentesis.  Would not remove more than few 100 ccs.  Inoculate blood cx bottles at bedside, cell count, fluid to cytology (send about 100cc).  Rule out SBP.  Currently on Unasyn.  2.  OK to continue PPI and octreotide although not having camilla hematemesis  3.  Nephro to eval for HD in am  4.  Xifaxan 550 mg PO BID.  Lactulose stopped due to loose stool  5.  Will re-eval for endo tomorrow.  Not actively bleeding and is high risk for sedation  6.  Discussed with Dr. Pascual

## 2017-06-27 NOTE — PROGRESS NOTES
Pt arrived to unit as higher level of care for administration of octreotide gtt. Pt placed on tele.  at bedside, updated on POC as pt is confused. Bed alarm on.

## 2017-06-27 NOTE — H&P
Physician Consult    Patient ID:  Abigail Bradley  [id number]  82 y.o. female  1935    History:  Primary Diagnosis: BENNETT, anuria    Ms. Arrington is a 82 year old female with no significant past medical history initially with Abdominal pain, sepsis.        HPI:  She has a history of Hepatits C and alcoholism which has led to liver cirrhosis. She did undergo a CT scan of the abdomen with contrast on 6/23/2017 at 1634. After this, the patient has had progressive increase in her serum creatinine level. She has a history of taking Motrin as an outpatient. Yesterday, she had a marked decrease in her urine output to 150 mL. We are consulted for management.    No history of renal failure in the past. No history of dialysis, no history of family kidney problems. The patient now carries a diagnosis of alcoholic cirrhosis and alcoholic hepatitis. She did have acute kidney injury which initially improved with IV fluids but then continually worse. The FeNa is less than 1%.    Past Medical History:  has no past medical history on file.  Past Surgical History:  has no past surgical history on file.  Past Social History:  reports that she has never smoked. She does not have any smokeless tobacco history on file. She reports that she drinks alcohol. She reports that she does not use illicit drugs.  Past Family History:   Family History   Problem Relation Age of Onset   • Cancer Neg Hx    • Diabetes Neg Hx    • Cancer Neg Hx    • Heart Disease Mother      Allergies: Review of patient's allergies indicates no known allergies.    Current Medications:  Prior to Admission medications    Medication Sig Start Date End Date Taking? Authorizing Provider   B Complex Vitamins (B COMPLEX PO) Take 1 Tab by mouth every day.   Yes Not In System Provider   CALCIUM PO Take 1 Tab by mouth 2 Times a Day.   Yes Not In System Provider   ibuprofen (MOTRIN) 200 MG Tab Take 200 mg by mouth every 6 hours as needed for Mild Pain.   Yes Not In System Provider  "      Review of Systems:  Review of Systems   Constitutional: Positive for malaise/fatigue.   Respiratory: Negative for shortness of breath.    Cardiovascular: Negative for chest pain.   Neurological: Positive for weakness.     Blood pressure 125/60, pulse 75, temperature 36.1 °C (97 °F), resp. rate 22, height 1.6 m (5' 3\"), weight 78.9 kg (173 lb 15.1 oz), SpO2 96 %.    Physical Examination:  Physical Exam   Constitutional: She is oriented to person, place, and time. She appears well-developed and well-nourished.   HENT:   Head: Normocephalic and atraumatic.   Cardiovascular: Normal rate and regular rhythm.    Pulmonary/Chest: Effort normal.   Abdominal: She exhibits distension. There is tenderness.   Musculoskeletal: She exhibits edema. She exhibits no tenderness.   Neurological: She is alert and oriented to person, place, and time.   Skin: Skin is warm and dry.   Psychiatric: She has a normal mood and affect. Her behavior is normal.       Impression:  1. BENNETT, likely ATN from contrast and hypovolemia  2. Alcoholic liver cirrhosis  3. Alcoholic hepatitis  4. UTI, on unasyn old with a candidate thought to start coming in the one         Plan:  -Will monitor, but given her worsening and likely cause will need dialysis  -Have HD catheter placed  -Plan on HD tomorrow if no improvement in Cr    Pre Procedure Assessment:  <EXAMSHORT2>      Pre Procedure update:   No changes.    Alfonso Bermudez  6/27/2017  "

## 2017-06-27 NOTE — PROGRESS NOTES
Patient lying in bed, eyes open, in no apparent distress. Patient is confused, oriented only to self. Patient bed alarm on, and side rails up X3.

## 2017-06-28 PROBLEM — R76.8 HEPATITIS C ANTIBODY TEST POSITIVE: Status: ACTIVE | Noted: 2017-01-01

## 2017-06-28 PROBLEM — R10.9 ABDOMINAL PAIN: Status: RESOLVED | Noted: 2017-01-01 | Resolved: 2017-01-01

## 2017-06-28 NOTE — ASSESSMENT & PLAN NOTE
Likely has had exposure in the past.  HCR RNA PCR indicates viral load.  May consider treatment down the line as an outpatient.

## 2017-06-28 NOTE — PROCEDURES
Procedure Note  Procedure: Paracentesis  Physician(s): Greg Diaz  Indication: Ascites in a septic patient  Anesthesia: 1% Lidocaine    Ultrasound guidance was used to locate and naila an ascitic pocket    A time-out was completed, verifying correct patient, procedure, site, positioning, and implant(s) or special equipment if applicable. Patient was positioned, prepped, and draped in the usual sterile fashion. 1% Lidocaine was used to anesthetize the area. A standard paracentesis kit needle was introduced into the peritoneal space and 100 ml clear yellow/cloudy yellow/bloody fluid was removed and sent for cell counts, gram stain, bacterial culture, and albumin levels.     Complications: None  Blood loss: Minimal  Dr. Guthrie was present during the entire procedure.  Consent obtained from  Kelby Bradley as patient is not decisionally capacitated now.

## 2017-06-28 NOTE — PROGRESS NOTES
Report received at bedside and assume care at this time. Patient AxOx1, confused. Denies pain. Turned Q2 and repositioned with pillow support. Rectal tube noted to be removed upon initial assessment. Will re-evaluate necessity throughout shift. Heart monitor intact, Sinus Rhythm. Left FA IV CDI, +patent/blood return, no erythema/ swelling noted. Octreotide infusing. IV abx therapy administered as prescribed. Mireya-care and barrier cream applied PRN. Tegaderm with 2x2 gauze applied to paracentesis site, moderate sero-sang discharge noted. Verified call-light within reach, bed alarm set and wheels locked. Instructed patient to call for assistance PRN. Reinforcement needed. Hourly rounding performed and will continue to monitor.

## 2017-06-28 NOTE — PROGRESS NOTES
Patient down to IR for Temp cath placement by transport team via hospital bed. Monitors aware, signed consent in chart with patient on bed, octreotide continuous fluid still hanging.  called and updated. Per Idalia in HD, once IR calls to give report, they want her to come straight to HD.  also aware of the plan.

## 2017-06-28 NOTE — PROGRESS NOTES
Report received from PM RN, care of patient assumed. POC discussed at bedside, no immediate concerns stated at this time. Safety measures in place/call light in reach. Will continue to round hourly. No BM during last night's shift per PM RN. MD notified this AM and will continue to monitor for bloody/dark stool. Plan for temp cath and HD today per MD.

## 2017-06-28 NOTE — PROGRESS NOTES
Hemodialysis Treatment ordered today per Dr Bermudez x3 hours. Treatment  initiated at 1215,ended at 1516.    Net  ml.  Pt was given Albumin 25% solution 12.5 gm for low SBP. See dialysis flow sheets for charting.    Post treatment; CVC was flushed with saline then locked with Heparin per designated amount in each port then clamped, and capped. Aspirate heparin to the next CVC use.

## 2017-06-28 NOTE — PROGRESS NOTES
Gastroenterology Progress Note     Author: Piper Grant   Date & Time Created: 6/28/2017 10:52 AM    Interval History:  GI Consults re: melena    Patient off floor for HD cath and then HD  Remains confused  Denies shortness of breath,chest pain and abdominal pain. She does not know if she had a bowel movement    Review of Systems:  Review of Systems   Unable to perform ROS: medical condition       Physical Exam:  Physical Exam   Constitutional: She appears well-developed.   Cardiovascular: Normal rate and regular rhythm.    Murmur heard.  Pulmonary/Chest: Effort normal and breath sounds normal. She has no wheezes. She has no rales.   Abdominal: Soft. Bowel sounds are normal. She exhibits distension. There is no tenderness.   Musculoskeletal: She exhibits edema.   Neurological: She is alert.   Skin: Skin is dry.       Labs:        Invalid input(s): [id number]      Recent Labs      06/26/17   1029  06/27/17   0307  06/28/17   0133   SODIUM  130*  132*  131*   POTASSIUM  4.3  4.2  4.5   CHLORIDE  107  108  108   CO2  17*  17*  15*   BUN  57*  60*  69*   CREATININE  2.54*  3.06*  3.68*   MAGNESIUM   --    --   1.8   CALCIUM  8.2*  8.6  8.7     Recent Labs      06/26/17   0413  06/26/17   1029  06/27/17   0307  06/28/17   0133   ALTSGPT  115*   --   103*  104*   ASTSGOT  116*   --   104*  103*   ALKPHOSPHAT  113*   --   103*  107*   TBILIRUBIN  8.2*   --   8.3*  8.0*   GLUCOSE  78  89  74  80     Recent Labs      06/26/17   0413   06/27/17   0307  06/27/17   0812  06/28/17   0132   RBC  2.93*   < >  2.72*  2.77*  2.67*   HEMOGLOBIN  9.6*   < >  9.0*  9.2*  9.0*   HEMATOCRIT  31.0*   < >  28.7*  29.0*  28.3*   PLATELETCT  100*   < >  93*  101*  95*   PROTHROMBTM  23.7*   --   24.5*   --   23.8*   INR  2.04*   --   2.13*   --   2.05*    < > = values in this interval not displayed.     Recent Labs      06/26/17   0413   06/27/17   0307  06/27/17   0812  06/28/17   0132  06/28/17   0133   WBC  12.4*   < >  12.8*   12.5*  10.7   --    NEUTSPOLYS  75.20*   < >  82.40*  82.20*  81.90*   --    LYMPHOCYTES  9.40*   < >  6.30*  6.40*  6.50*   --    MONOCYTES  11.40   < >  9.20  9.50  9.80   --    EOSINOPHILS  2.70   < >  0.90  0.90  0.90   --    BASOPHILS  0.40   < >  0.30  0.40  0.40   --    ASTSGOT  116*   --   104*   --    --   103*   ALTSGPT  115*   --   103*   --    --   104*   ALKPHOSPHAT  113*   --   103*   --    --   107*   TBILIRUBIN  8.2*   --   8.3*   --    --   8.0*    < > = values in this interval not displayed.     Hemodynamics:  Temp (24hrs), Av.1 °C (97 °F), Min:35.8 °C (96.5 °F), Max:36.5 °C (97.7 °F)  Temperature: 36.5 °C (97.7 °F)  Pulse  Av.7  Min: 68  Max: 98   Blood Pressure : 135/64 mmHg     Respiratory:    Respiration: 18, Pulse Oximetry: 95 %        RUL Breath Sounds: Diminished, RML Breath Sounds: Diminished, RLL Breath Sounds: Diminished, SARAH Breath Sounds: Diminished, LLL Breath Sounds: Diminished  Fluids:    Intake/Output Summary (Last 24 hours) at 17 1052  Last data filed at 17 0600   Gross per 24 hour   Intake    320 ml   Output     30 ml   Net    290 ml     Weight: 87.5 kg (192 lb 14.4 oz)  GI/Nutrition:  Orders Placed This Encounter   Procedures   • DIET NPO     Standing Status: Standing      Number of Occurrences: 8      Standing Expiration Date:      Order Specific Question:  Restrict to:     Answer:  Sips with Medications [3]     Medical Decision Making, by Problem:  Active Hospital Problems    Diagnosis   • Upper GI bleed [K92.2]   • Bacteremia due to Escherichia coli [R78.81]   • Sepsis (CMS-HCC) [A41.9]   • Alcoholic cirrhosis of liver with ascites (CMS-HCC) [K70.31]   • Chronic hepatitis C virus infection (CMS-HCC) [B18.2]   • Acute kidney injury (CMS-HCC) [N17.9]   • Urinary tract infection [N39.0]   • Hyperbilirubinemia [E80.6]   • Thrombocytopenia (CMS-HCC) [D69.6]   • Elevated troponin [R74.8]   • Abdominal pain [R10.9]   • Alcoholic hepatitis [K70.10]   • Excoriation  of buttock [S30.200A]     Impression:  1.  Melena x 1 episode  2.  Decompensated cirrhosis:  Alcohol and HCV Ab positive (viral load pending).  PRASANNA neg.  Iron studies in am  3.  Abd pain.  No SBP on tap but already on Unasyn.  Pain resolved  3.  Ascites, large, as above  4.  BENNETT, for HD today  5.  Confusion with normal ammonia.  Started Xifaxan after lactulose was stopped yesterday   6.  Anemia, acute on chronic blood loss?  7.  Coagulopathy  8.  Thrombocytopenia      Plan:  No further bleeding.  Will re-eval for timing of EGD  On Xifaxan for ?HE although normal NH3, also on lactulose  Started on Trental by primary service.  AST/ALT ratio <2  On Protonix IV BID  Dr. Duggan assuming GI service in am    Core Measures

## 2017-06-28 NOTE — OR SURGEON
Immediate Post- Operative Note        PostOp Diagnosis: RENAL FAILURE      Procedure(s): RIGHT INTERNAL JUGULAR NON TUNNELED TEMPORARY 12.5F MAHURKAR PLACEMENT WITH U/S AND FLUOROSCOPIC GUIDANCE      Estimated Blood Loss: <5CC (FLUSHES)        Complications: NONE            6/28/2017     11:41 AM     Wesley Negrete

## 2017-06-28 NOTE — PROGRESS NOTES
[facility] [place] [facility] [place]    Name Abigail Bradley     1935   Age/Sex 82 y.o. female   MRN [id number]   Code Status DNAR     After 5PM or if no immediate response to page, please call for cross-coverage  Attending/Team: Dr. Ojeda/Melchor Use Tiger Text to page  6AM-5PM  Call [phone] to page afterhours   1st Call - Day Intern (R1):   Dr. Diaz 2nd Call - Day Sr. Resident (R2/R3):   Dr. Guthrie         Chief complaint/ reason for interval visit (Primary Diagnosis)   Fatigue; Abdominal pain; UTI; Sepsis    Interval Problem Daily Status Update    Patient underwent paracentesis yesterday. Ascitic fluid analysis unremarkable. She underwent placement of hemodialysis catheter today and is to undergo hemodialysis today. Suspect the BENNETT if due to HENRY to be temporary and to resolve.     Nephrology and gastroenterology following patient. Appreciate recommendations.      Review of Systems   Constitutional: Positive for malaise/fatigue. Negative for fever and chills.   HENT: Negative for congestion and sore throat.    Eyes: Negative for blurred vision and double vision.   Respiratory: Negative for cough and shortness of breath.    Cardiovascular: Negative for chest pain and palpitations.   Gastrointestinal: Negative for nausea, vomiting and abdominal pain.   Genitourinary: Negative for dysuria and urgency.   Musculoskeletal: Negative for back pain and falls.   Skin: Negative for rash.   Neurological: Positive for weakness. Negative for tremors, focal weakness and headaches.   Psychiatric/Behavioral: Negative for depression and suicidal ideas.         Quality Measures  EKG reviewed, Labs reviewed, Medications reviewed and Radiology images reviewed  العراقي catheter: Critically Ill - Requiring Accurate Measurement of Urinary Output      DVT Prophylaxis: Contraindicated - High bleeding risk  DVT prophylaxis - mechanical: SCDs  Ulcer prophylaxis: Yes  Antibiotics: Treating active  infection/contamination beyond 24 hours perioperative coverage            Physical Exam       Filed Vitals:    06/28/17 1131 06/28/17 1136 06/28/17 1141 06/28/17 1513   BP:    142/77   Pulse: 75 72 72 82   Temp:    36.1 °C (97 °F)   Resp: 18 18 18 18   Height:       Weight:       SpO2: 96% 95% 95% 95%     Body mass index is 34.18 kg/(m^2). Weight: 87.5 kg (192 lb 14.4 oz)  Oxygen Therapy:  Pulse Oximetry: 95 %, O2 (LPM): 0, O2 Delivery: None (Room Air)    Physical Exam   Constitutional: She is oriented to person, place, and time. She appears unhealthy.   HENT:   Head: Normocephalic and atraumatic.   Eyes: EOM are normal. Pupils are equal, round, and reactive to light.   Cardiovascular: Normal rate, regular rhythm and normal heart sounds.    Pulmonary/Chest: Effort normal. She has no wheezes. She has no rales.   Abdominal: Soft. Bowel sounds are normal. She exhibits distension. There is no tenderness. There is no rebound and no guarding.   Neurological: She is alert and oriented to person, place, and time. GCS score is 15.         Lab Data Review:      6/22/2017  12:36 PM    Recent Labs      06/26/17   1029  06/27/17   0307  06/28/17   0133   SODIUM  130*  132*  131*   POTASSIUM  4.3  4.2  4.5   CHLORIDE  107  108  108   CO2  17*  17*  15*   BUN  57*  60*  69*   CREATININE  2.54*  3.06*  3.68*   MAGNESIUM   --    --   1.8   CALCIUM  8.2*  8.6  8.7       Recent Labs      06/26/17   0413  06/26/17   1029  06/27/17   0307  06/28/17   0133   ALTSGPT  115*   --   103*  104*   ASTSGOT  116*   --   104*  103*   ALKPHOSPHAT  113*   --   103*  107*   TBILIRUBIN  8.2*   --   8.3*  8.0*   GLUCOSE  78  89  74  80       Recent Labs      06/26/17   0413   06/27/17   0307  06/27/17   0812  06/28/17   0132   RBC  2.93*   < >  2.72*  2.77*  2.67*   HEMOGLOBIN  9.6*   < >  9.0*  9.2*  9.0*   HEMATOCRIT  31.0*   < >  28.7*  29.0*  28.3*   PLATELETCT  100*   < >  93*  101*  95*   PROTHROMBTM  23.7*   --   24.5*   --   23.8*   INR  2.04*    --   2.13*   --   2.05*    < > = values in this interval not displayed.       Recent Labs      06/26/17   0413   06/27/17   0307  06/27/17   0812  06/28/17   0132  06/28/17   0133   WBC  12.4*   < >  12.8*  12.5*  10.7   --    NEUTSPOLYS  75.20*   < >  82.40*  82.20*  81.90*   --    LYMPHOCYTES  9.40*   < >  6.30*  6.40*  6.50*   --    MONOCYTES  11.40   < >  9.20  9.50  9.80   --    EOSINOPHILS  2.70   < >  0.90  0.90  0.90   --    BASOPHILS  0.40   < >  0.30  0.40  0.40   --    ASTSGOT  116*   --   104*   --    --   103*   ALTSGPT  115*   --   103*   --    --   104*   ALKPHOSPHAT  113*   --   103*   --    --   107*   TBILIRUBIN  8.2*   --   8.3*   --    --   8.0*    < > = values in this interval not displayed.     Active Problems:    Hyperbilirubinemia POA: Yes    Alcoholic cirrhosis of liver with ascites (CMS-HCC) POA: Yes    Sepsis (CMS-HCC) POA: Yes    Abdominal pain POA: Yes    Urinary tract infection POA: Yes    Thrombocytopenia (CMS-HCC) POA: Yes      Alcoholic hepatitis POA: Yes    Excoriation of buttock POA: Yes    Elevated troponin POA: Yes    Acute kidney injury (CMS-HCC) POA: Yes  Resolved Problems:    * No resolved hospital problems. *      Assessment/Plan     Alcoholic cirrhosis of liver with ascites (CMS-HCC)  Likely alcohol related from chronic alcohol abuse. Although hepatitis C could be still be possible.  Last drink was reportedly a month ago, so out of range for withdrawal.  Cirrhosis was identified on ultrasound.  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  Poor prognosis.    Alcoholic hepatitis  Patient's liver enzymes elevated but patient's last drink was a month ago.  Could be having alcoholic hepatitis since a month.  INR is elevated and T. Bili is at 10.  Thiernoey's discriminant function is 60.6 which indicates steroid use to reduce mortality but it is unclear how much bilirubin is from the hepatitis vs biliary obstruction.  In any case starting  steroids is contraindicated as patient is septic.  On pentoxifylline at renal dosing.  Also on rifaximin and lactulose. Ammonia has been stable since admission.  Gastroenterology following, appreciate recommendations.    Hyperbilirubinemia  T. Bili elevated at 10.  50% of the elevation is indirect bilirubin and 50% is direct bilirubin indicating intrahepatic process.  Likely secondary to alcoholic vs viral hepatitis and/or cirrhosis. However given ultrasound of abdomen revealing GB sludge and thickened gallbladder wall, there may biliary duct obstruction as well.  Will continue to monitor.  MRCP does not reveal any obstruction.    Abdominal pain  Unclear as the source of abdominal pain.  Initially cholecystitis was thought to be the etiology based on imaging findings but SBP could be a possibility too.  GERD or alcoholic gastritis could be a possibility as well.  Resolved currently.    Urinary tract infection  UA revealed the presence of a UTI.  Currently has unasyn for coverage.  Urine cultures discarded as they were non-specific/    Thrombocytopenia (CMS-HCC)  Likely secondary to alcoholic liver disease or sepsis.  TEG study reveals a near normal coagulable state.  Continue to monitor.    Excoriation of buttock  Etiology from being bed bound.  Is erythematous and oozing.  Received one dose of vancomycin and then switched over to doxycycline for a day.  Doesn't appear to need antibiotics as it does not look infected.  Wound care consult in place.    Acute kidney injury (CMS-HCC)  Most likely etiology is contrast induced nephropathy. However ATN or hepatorenal syndrome could be another possibility.   FeNa is 0.11% showing pre-renal.   Urine eosonophils are negative.  Will repeat urine electrolytes.  Nephrologist Dr. Bermudez on board, appreciate recommendations.  Patient has had temp dialysis catheter placed.  Undergoing hemodialysis per nephro recommendations.    Elevated troponin  Troponins elevated at 0.07 and  repeat measurement was at the same.  EKG was negative for ischemic changes.  Likely secondary to demand ischemia.  Monitor for cardiac symptoms.    Sepsis (CMS-HCC)  Source of infection is likely UTI.  Blood cultures identified E.coloi in 2/2 bottles but urine cultures are non-specific but showing enteric organisms.  Although lactic acid is elevated it could be type 2 lactic acidosis from liver failure.  Continue with IV maintenance fluids.  Continue with unasyn for a total of 10 days.    Upper GI bleed  Patient noted to have black stools by RN.  Occult stool positive.  Vitals are stable.  Serial H/Hs show minimal drop in Hb but has been stable.  Most likely is having an occult bleed.  Gastroenterology following patient, appreciate recommendations.    Hepatitis C antibody test positive  Likely has had exposure in the past.  HCR RNA PCR pending.        Consultants/Specialty  Surgery: Dr. Garibay  Gastroenterology: Dr. Grant  Nephrology: Dr. Bermudez    Disposition  Remain inpatient for further managment

## 2017-06-28 NOTE — PROGRESS NOTES
Hospital Medicine Progress Note, Adult, Complex               Author: Alfonso Bermudez Date & Time created: 6/28/2017  10:41 AM     Interval History:  82 year old with Hep C, cirrhosis, with BENNETT felt to be either HENRY, or ATN. Currently anuric, no improvement overnight, HD catheter to be placed and patient to be started on HD. D/w patient prior to the procedure.    Review of Systems:  Review of Systems   Constitutional: Negative for fever and chills.   Cardiovascular: Negative for chest pain and palpitations.       Physical Exam:  Physical Exam   Constitutional: She appears well-developed. She appears ill.   Cardiovascular: Normal rate and regular rhythm.    Pulmonary/Chest: Effort normal and breath sounds normal.   Abdominal: Soft. She exhibits distension.   Musculoskeletal: She exhibits edema. She exhibits no tenderness.   Neurological: She is alert.   Skin: Skin is warm and dry.       Labs:        Invalid input(s): [id number]      Recent Labs      06/26/17   1029  06/27/17   0307  06/28/17   0133   SODIUM  130*  132*  131*   POTASSIUM  4.3  4.2  4.5   CHLORIDE  107  108  108   CO2  17*  17*  15*   BUN  57*  60*  69*   CREATININE  2.54*  3.06*  3.68*   MAGNESIUM   --    --   1.8   CALCIUM  8.2*  8.6  8.7     Recent Labs      06/26/17   0413  06/26/17   1029  06/27/17   0307  06/28/17   0133   ALTSGPT  115*   --   103*  104*   ASTSGOT  116*   --   104*  103*   ALKPHOSPHAT  113*   --   103*  107*   TBILIRUBIN  8.2*   --   8.3*  8.0*   GLUCOSE  78  89  74  80     Recent Labs      06/26/17   0413   06/27/17   0307  06/27/17   0812  06/28/17   0132   RBC  2.93*   < >  2.72*  2.77*  2.67*   HEMOGLOBIN  9.6*   < >  9.0*  9.2*  9.0*   HEMATOCRIT  31.0*   < >  28.7*  29.0*  28.3*   PLATELETCT  100*   < >  93*  101*  95*   PROTHROMBTM  23.7*   --   24.5*   --   23.8*   INR  2.04*   --   2.13*   --   2.05*    < > = values in this interval not displayed.     Recent Labs      06/26/17   0413   06/27/17   0307  06/27/17    0812  17   0132  17   0133   WBC  12.4*   < >  12.8*  12.5*  10.7   --    NEUTSPOLYS  75.20*   < >  82.40*  82.20*  81.90*   --    LYMPHOCYTES  9.40*   < >  6.30*  6.40*  6.50*   --    MONOCYTES  11.40   < >  9.20  9.50  9.80   --    EOSINOPHILS  2.70   < >  0.90  0.90  0.90   --    BASOPHILS  0.40   < >  0.30  0.40  0.40   --    ASTSGOT  116*   --   104*   --    --   103*   ALTSGPT  115*   --   103*   --    --   104*   ALKPHOSPHAT  113*   --   103*   --    --   107*   TBILIRUBIN  8.2*   --   8.3*   --    --   8.0*    < > = values in this interval not displayed.           Hemodynamics:  Temp (24hrs), Av.1 °C (97 °F), Min:35.8 °C (96.5 °F), Max:36.5 °C (97.7 °F)  Temperature: 36.5 °C (97.7 °F)  Pulse  Av.7  Min: 68  Max: 98   Blood Pressure : 135/64 mmHg     Respiratory:    Respiration: 18, Pulse Oximetry: 95 %        RUL Breath Sounds: Clear, RML Breath Sounds: Diminished, RLL Breath Sounds: Diminished, SARAH Breath Sounds: Clear, LLL Breath Sounds: Diminished  Fluids:    Intake/Output Summary (Last 24 hours) at 17 1041  Last data filed at 17 0600   Gross per 24 hour   Intake    320 ml   Output     30 ml   Net    290 ml     Weight: 87.5 kg (192 lb 14.4 oz)  GI/Nutrition:  Orders Placed This Encounter   Procedures   • DIET NPO     Standing Status: Standing      Number of Occurrences: 8      Standing Expiration Date:      Order Specific Question:  Restrict to:     Answer:  Sips with Medications [3]     Medical Decision Making, by Problem:  Active Hospital Problems    Diagnosis   • Upper GI bleed [K92.2]   • Bacteremia due to Escherichia coli [R78.81]   • Sepsis (CMS-HCC) [A41.9]   • Alcoholic cirrhosis of liver with ascites (CMS-HCC) [K70.31]   • Chronic hepatitis C virus infection (CMS-HCC) [B18.2]   • Acute kidney injury (CMS-HCC) [N17.9]   • Urinary tract infection [N39.0]   • Hyperbilirubinemia [E80.6]   • Thrombocytopenia (CMS-HCC) [D69.6]   • Elevated troponin [R74.8]   • Abdominal  pain [R10.9]   • Alcoholic hepatitis [K70.10]   • Excoriation of buttock [S30.810A]     1. BENNETT felt to be HENRY, vs ATN, worse; anuric  2. Hyponatremia, Na 131  3. Acidosis  4. Anemia, Hgb 9    -Plan for HD today, monitor daily for HD needs/recovery  -d/w Dr. Diaz  Core Measures

## 2017-06-28 NOTE — PROGRESS NOTES
[facility] [place] [facility] [place]    Name Abigail Bradley     1935   Age/Sex 82 y.o. female   MRN [id number]   Code Status DNAR     After 5PM or if no immediate response to page, please call for cross-coverage  Attending/Team: Dr. Ojeda/Melchor Use Tiger Text to page  6AM-5PM  Call [phone] to page afterhours   1st Call - Day Intern (R1):   Dr. Diaz 2nd Call - Day Sr. Resident (R2/R3):   Dr. Guthrie         Chief complaint/ reason for interval visit (Primary Diagnosis)   Fatigue; Abdominal pain; UTI; Sepsis    Interval Problem Daily Status Update    Patient underwent paracentesis to help on diagnosis. She tolerated the procedure well and will wait on the results. Gastroenterology and nephrology were consulted, recommendations are appreciated. Patient possibly to get hemodialysis tomorrow.      Review of Systems   Constitutional: Positive for malaise/fatigue. Negative for fever and chills.   HENT: Negative for congestion and sore throat.    Eyes: Negative for blurred vision and double vision.   Respiratory: Negative for cough and shortness of breath.    Cardiovascular: Negative for chest pain and palpitations.   Gastrointestinal: Negative for nausea, vomiting and abdominal pain.   Genitourinary: Negative for dysuria and urgency.   Musculoskeletal: Negative for back pain and falls.   Skin: Negative for rash.   Neurological: Positive for weakness. Negative for tremors, focal weakness and headaches.   Psychiatric/Behavioral: Negative for depression and suicidal ideas.         Quality Measures  EKG reviewed, Labs reviewed, Medications reviewed and Radiology images reviewed  العراقي catheter: Critically Ill - Requiring Accurate Measurement of Urinary Output      DVT Prophylaxis: Contraindicated - High bleeding risk  DVT prophylaxis - mechanical: SCDs  Ulcer prophylaxis: Yes  Antibiotics: Treating active infection/contamination beyond 24 hours perioperative  coverage            Physical Exam       Filed Vitals:    06/27/17 0730 06/27/17 1131 06/27/17 1357 06/27/17 1600   BP: 144/56 136/50 125/60 138/65   Pulse: 85 79 75 70   Temp: 36.2 °C (97.1 °F) 36.2 °C (97.1 °F) 36.1 °C (97 °F) 36.1 °C (97 °F)   Resp: 18 18 22 20   Height:       Weight:       SpO2: 97% 96% 96% 94%     Body mass index is 30.82 kg/(m^2).    Oxygen Therapy:  Pulse Oximetry: 94 %, O2 (LPM): 0, O2 Delivery: Silicone Nasal Cannula    Physical Exam   Constitutional: She is oriented to person, place, and time. She appears unhealthy.   HENT:   Head: Normocephalic and atraumatic.   Eyes: EOM are normal. Pupils are equal, round, and reactive to light.   Cardiovascular: Normal rate, regular rhythm and normal heart sounds.    Pulmonary/Chest: Effort normal. She has no wheezes. She has no rales.   Abdominal: Soft. Bowel sounds are normal. She exhibits distension. There is no tenderness. There is no rebound and no guarding.   Neurological: She is alert and oriented to person, place, and time. GCS score is 15.         Lab Data Review:      6/22/2017  12:36 PM    Recent Labs      06/26/17   0413  06/26/17   1029  06/27/17   0307   SODIUM  131*  130*  132*   POTASSIUM  4.1  4.3  4.2   CHLORIDE  106  107  108   CO2  20  17*  17*   BUN  55*  57*  60*   CREATININE  2.32*  2.54*  3.06*   CALCIUM  8.3*  8.2*  8.6       Recent Labs      06/25/17   0233  06/26/17   0413  06/26/17   1029  06/27/17   0307   ALTSGPT  119*  115*   --   103*   ASTSGOT  114*  116*   --   104*   ALKPHOSPHAT  103*  113*   --   103*   TBILIRUBIN  8.1*  8.2*   --   8.3*   GLUCOSE  94  78  89  74       Recent Labs      06/26/17   0413  06/26/17   1813  06/27/17   0307  06/27/17   0812   RBC  2.93*  2.75*  2.72*  2.77*   HEMOGLOBIN  9.6*  9.2*  9.0*  9.2*   HEMATOCRIT  31.0*  29.9*  28.7*  29.0*   PLATELETCT  100*  91*  93*  101*   PROTHROMBTM  23.7*   --   24.5*   --    INR  2.04*   --   2.13*   --        Recent Labs      06/25/17   0233  06/26/17    0413  06/26/17   1813  06/27/17   0307  06/27/17   0812   WBC  11.5*  12.4*  14.3*  12.8*  12.5*   NEUTSPOLYS  80.30*  75.20*  81.30*  82.40*  82.20*   LYMPHOCYTES  5.40*  9.40*  6.20*  6.30*  6.40*   MONOCYTES  10.70  11.40  10.60  9.20  9.50   EOSINOPHILS  2.70  2.70  0.80  0.90  0.90   BASOPHILS  0.00  0.40  0.60  0.30  0.40   ASTSGOT  114*  116*   --   104*   --    ALTSGPT  119*  115*   --   103*   --    ALKPHOSPHAT  103*  113*   --   103*   --    TBILIRUBIN  8.1*  8.2*   --   8.3*   --      Active Problems:    Hyperbilirubinemia POA: Yes    Alcoholic cirrhosis of liver with ascites (CMS-HCC) POA: Yes    Sepsis (CMS-HCC) POA: Yes    Abdominal pain POA: Yes    Urinary tract infection POA: Yes    Thrombocytopenia (CMS-HCC) POA: Yes      Alcoholic hepatitis POA: Yes    Excoriation of buttock POA: Yes    Elevated troponin POA: Yes    Acute kidney injury (CMS-HCC) POA: Yes  Resolved Problems:    * No resolved hospital problems. *      Assessment/Plan     Alcoholic cirrhosis of liver with ascites (CMS-HCC)  Likely alcohol related from chronic alcohol abuse. Although hepatitis C could be still be possible.  Last drink was reportedly a month ago, so out of range for withdrawal.  Cirrhosis was identified on ultrasound.  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  Poor prognosis.    Alcoholic hepatitis  Patient's liver enzymes elevated but patient's last drink was a month ago.  Could be having alcoholic hepatitis since a month.  INR is elevated and T. Bili is at 10.  Maddrey's discriminant function is 60.6 which indicates steroid use to reduce mortality but it is unclear how much bilirubin is from the hepatitis vs biliary obstruction.  In any case starting steroids is contraindicated as patient is septic.  Pentoxifylline initiated at renal dosing.    Hyperbilirubinemia  T. Bili elevated at 10.  50% of the elevation is indirect bilirubin and 50% is direct bilirubin indicating  intrahepatic process.  Likely secondary to alcoholic vs viral hepatitis and/or cirrhosis. However given ultrasound of abdomen revealing GB sludge and thickened gallbladder wall, there may biliary duct obstruction as well.  Will continue to monitor.  MRCP does not reveal any obstruction.    Abdominal pain  Unclear as the source of abdominal pain.  Initially cholecystitis was thought to be the etiology based on imaging findings but SBP could be a possibility too.  GERD or alcoholic gastritis could be a possibility as well.  Resolved currently.    Urinary tract infection  UA revealed the presence of a UTI.  Currently has unasyn for coverage.  Urine cultures discarded as they were non-specific/    Thrombocytopenia (CMS-[facility])  Likely secondary to alcoholic liver disease or sepsis.  TEG study reveals a near normal coagulable state.  Continue to monitor.    Excoriation of buttock  Etiology from being bed bound.  Is erythematous and oozing.  Received one dose of vancomycin and then switched over to doxycycline for a day.  Doesn't appear to need antibiotics.  Wound care consult in place.    Acute kidney injury (CMS-[facility])  Patient is anuric currently.  FeNa is 0.11% showing pre-renal.   Urine eosonophils are negative.  Nephrology on board and attribute it to possible contrast induced nephropathy.  Possible hemodialysis.    Elevated troponin  Troponins elevated at 0.07 and repeat measurement was at the same.  EKG was negative for ischemic changes.  Likely secondary to demand ischemia.  Monitor for cardiac symptoms.    Sepsis (CMS-[facility])  Source of infection is likely UTI.  Blood cultures identified E.coloi in 2/2 bottles but urine cultures are non-specific but showing enteric organisms.  Although lactic acid is elevated it could be type 2 lactic acidosis from liver failure.  Continue with IV maintenance fluids.  Continue with unasyn.  Repeat cultures have been negative.    Upper GI bleed  Patient noted to have black stools by  RN.  Occult stool positive.  Vitals are stable.  Serial H/Hs show minimal drop in Hb.  Most likely is having an occult bleed.  Will get gastroenterology consult for possible endoscopy.            Consultants/Specialty  Surgery: Dr. Garibay  Gastroenterology: Dr. Grant  Nephrology: Dr. Bermudez    Disposition  Remain inpatient for further managment

## 2017-06-28 NOTE — PROGRESS NOTES
Consent by Kelby Bradley () obtained and consent form signed by Grace SANCHEZ and Morena CRESPO. IR notified, plan for Temp cath placement this AM.

## 2017-06-28 NOTE — PROGRESS NOTES
IR Note:  Dr Negrete completed placement of temporary hemodialysis catheter to right IJ.  Patient tolerated procedure well with no sedation.  Report called to dialysis RN and patient taken to dialysis with transport.

## 2017-06-29 NOTE — PROGRESS NOTES
Hemodialysis ordered by Dr. Bermudez. Treatment started at 0811 and ended at 1111. Pt confused, consistently touching and pulling RT IJ TC. Had to remind pt not to touch or pull TC. Dr. Bermudez notified and aware pt confused. Pt stable, vss, no c/o post tx. See flow sheets for details. Net UF 1.0 liter. Report to JUAN Jung RN.

## 2017-06-29 NOTE — PROGRESS NOTES
updated on patient. States he will come visit tomorrow.   Patient continuously is pulling of oxygen, orientation and education provided. Humidified o2 applied to reduce irritation.

## 2017-06-29 NOTE — PROGRESS NOTES
Report received at bedside and assumed care at this time. Patient AxOx1, confused. Denies pain. Weaned off oxygen - post dialysis treatment. Pulse oximetry stable = 94%. Patient turn Q2 and repositioned with pillow support. Mireya-care and barrier cream applied PRN. Primo boots/heel protectors intact. SCD's re-applied. Left FA IV CDI, +patent/blood return, no erythema/swelling noted. Octreotide infusing. Right IJ dialysis cath CDI, lumens clamped. العراقي catheter intact, hematuria present. Verified call-light within reach, bed alarm set and wheels locked. Instructed patient to call for assistance PRN. Reinforcement needed. Hourly rounding performed and will continue to monitor.

## 2017-06-29 NOTE — DIETARY
Nutrition: Day 8 of admit.  83 yo female admitted with ascending cholangitis and BENNETT.  Alcoholic cirrhosis/hepatitis with ascites. Consult received for poor nutritional intake.     Assessment:  1)  Weight today 90.8 kg is increased 22.9 kg since admit scale weight of 67.9 kg.    2) Pt with ascites underwent paracentesis on 6/27.    3) HD catheter placed yesterday and received HD yesterday and again today.    4) Acute renal failure likely related to contrast induced nephropathy. Being followed by nephrology.  5) Seen by SLP on 6/29 and dysphagia 3 (mechanical soft diet) recommended. Currently on a full liquid diet with poor intake.     Recommendations/Plan:  1) advance diet to regular. Pt with acute renal failure receiving dialysis, electrolytes wnl.  2) order boost Plus supplements with meals - discussed with RN who will place order  3) encourage intake of meals and supplements  4) document intake of all meals and supplements as % taken in ADL's to provide interdisciplinary communication across all shifts   5) monitor daily weights for adequacy of nutrition and fluid balance    Discussed with RN

## 2017-06-29 NOTE — WOUND TEAM
ZAK Eubanks concerned over increased bleeding from patient's severe IAD. Patient just came back from dialysis, patient's typically recieve some heparin during that procedure. Patient also has hep[atic cirrhosis which leads to coagulopathy.  Recommended further monitoring of bleeding from RN, continuing with current skin care order for the IAD,  and ordered a low air loss bed for the patient.     1600: Wound Team in to follow up on patient's severe IAD. Assisted in placing patient on low air loss bed. It is an older model so kept patient on the waffle overlay.  The IAD is smaller. Bleeding from the left side, but ZAK Eubanks stated it was much less than before. Assisted in repositioning patient.    Wound Team to follow.

## 2017-06-29 NOTE — PROGRESS NOTES
Hospital Medicine Progress Note, Adult, Complex               Author: Alfonso Bermudez Date & Time created: 6/29/2017  1:27 PM     Interval History:  82 year old with Hep C, cirrhosis, with BENNETT felt to be either HENRY, or ATN.     Seen on dialysis, tolerating treatment, though confused    Review of Systems:  Review of Systems   Unable to perform ROS: mental acuity   Constitutional: Negative for fever and chills.   Cardiovascular: Negative for chest pain and palpitations.       Physical Exam:  Physical Exam   Constitutional: She appears well-developed. She appears ill.   Cardiovascular: Normal rate and regular rhythm.    Pulmonary/Chest: Effort normal and breath sounds normal.   Abdominal: Soft. She exhibits distension.   Musculoskeletal: She exhibits edema. She exhibits no tenderness.   Neurological: She is alert.   Skin: Skin is warm and dry.       Labs:        Invalid input(s): [id number]      Recent Labs      06/27/17 0307 06/28/17 0133 06/29/17   0144   SODIUM  132*  131*  136   POTASSIUM  4.2  4.5  4.1   CHLORIDE  108  108  103   CO2  17*  15*  22   BUN  60*  69*  39*   CREATININE  3.06*  3.68*  2.90*   MAGNESIUM   --   1.8   --    CALCIUM  8.6  8.7  8.1*     Recent Labs      06/27/17 0307 06/28/17 0133 06/29/17   0144   ALTSGPT  103*  104*  95*   ASTSGOT  104*  103*  96*   ALKPHOSPHAT  103*  107*  108*   TBILIRUBIN  8.3*  8.0*  8.5*   GLUCOSE  74  80  67     Recent Labs      06/27/17 0307 06/27/17 0812 06/28/17 0132 06/29/17   0144   RBC  2.72*  2.77*  2.67*  2.77*   HEMOGLOBIN  9.0*  9.2*  9.0*  9.2*   HEMATOCRIT  28.7*  29.0*  28.3*  28.7*   PLATELETCT  93*  101*  95*  89*   PROTHROMBTM  24.5*   --   23.8*   --    INR  2.13*   --   2.05*   --    IRON   --    --    --   47   FERRITIN   --    --    --   142.0   TOTIRONBC   --    --    --   154*     Recent Labs      06/27/17 0307 06/27/17 0812 06/28/17   0132  06/28/17   0133  06/29/17   0144   WBC  12.8*  12.5*  10.7   --   13.3*    NEUTSPOLYS  82.40*  82.20*  81.90*   --   85.20*   LYMPHOCYTES  6.30*  6.40*  6.50*   --   4.40*   MONOCYTES  9.20  9.50  9.80   --   9.50   EOSINOPHILS  0.90  0.90  0.90   --   0.20   BASOPHILS  0.30  0.40  0.40   --   0.30   ASTSGOT  104*   --    --   103*  96*   ALTSGPT  103*   --    --   104*  95*   ALKPHOSPHAT  103*   --    --   107*  108*   TBILIRUBIN  8.3*   --    --   8.0*  8.5*           Hemodynamics:  Temp (24hrs), Av.2 °C (97.1 °F), Min:35.9 °C (96.7 °F), Max:36.3 °C (97.3 °F)  Temperature: 35.9 °C (96.7 °F)  Pulse  Av.8  Min: 68  Max: 98   Blood Pressure : 113/91 mmHg     Respiratory:    Respiration: 20, Pulse Oximetry: 92 %        RUL Breath Sounds: Diminished, RML Breath Sounds: Diminished, RLL Breath Sounds: Diminished, SARAH Breath Sounds: Diminished, LLL Breath Sounds: Diminished  Fluids:    Intake/Output Summary (Last 24 hours) at 17 1327  Last data filed at 17 1111   Gross per 24 hour   Intake   1270 ml   Output   2475 ml   Net  -1205 ml     Weight: 90.8 kg (200 lb 2.8 oz)  GI/Nutrition:  Orders Placed This Encounter   Procedures   • DIET ORDER     Standing Status: Standing      Number of Occurrences: 1      Standing Expiration Date:      Order Specific Question:  Diet:     Answer:  Full Liquid [11]     Medical Decision Making, by Problem:  Active Hospital Problems    Diagnosis   • Upper GI bleed [K92.2]   • Bacteremia due to Escherichia coli [R78.81]   • Sepsis (CMS-HCC) [A41.9]   • Alcoholic cirrhosis of liver with ascites (CMS-HCC) [K70.31]   • Chronic hepatitis C virus infection (CMS-HCC) [B18.2]   • Acute kidney injury (CMS-HCC) [N17.9]   • Urinary tract infection [N39.0]   • Hyperbilirubinemia [E80.6]   • Thrombocytopenia (CMS-HCC) [D69.6]   • Elevated troponin [R74.8]   • Abdominal pain [R10.9]   • Alcoholic hepatitis [K70.10]   • Excoriation of buttock [S30.810A]     1. BENNETT felt to be HENRY, vs ATN, on dialysis, monitor for recovery  2. Hyponatremia, resolved  3.  Acidosis, improved  4. Anemia, Hgb 9.2    -HD today, will monitor daily for HD needs   Core Measures

## 2017-06-29 NOTE — PROGRESS NOTES
Assessment completed. Patient A&O x1, oriented to self. Pt has no complaints of pain at this time. POC discussed. Pt down to dialysis. Administered meds and repositioned/turned pt prior. Pt tolerated thin liquids with meds, no cough. SCDS on. Call light & bedside table within reach. Bed locked and in lowest position. Bed alarm on and fall precautions in place. Hourly rounding in place.

## 2017-06-29 NOTE — PROGRESS NOTES
Pt continues to tug at R IJ dialysis catheter despite numerous reminders. Pt only oriented to self. Pt placed in bilateral soft wrist restraints. Notified Dr. Guthrie and order received.

## 2017-06-30 PROBLEM — R41.82 ALTERED MENTAL STATUS: Status: ACTIVE | Noted: 2017-01-01

## 2017-06-30 NOTE — PROGRESS NOTES
Received report from nightshift RN, assumed care of patient. Patient is A&O x 1, bed alarm on. Patient educated on importance of calling if in need of assistance. Verbalizes understanding. Patient declines pain at this time. Patient updated on plan of care, no evidence of learning. Will continue to monitor for safety and comfort.

## 2017-06-30 NOTE — PROGRESS NOTES
Hospital Medicine Progress Note, Adult, Complex               Author: Alfonso Bermudez Date & Time created: 6/30/2017  1:41 PM     Interval History:  82 year old with Hep C, cirrhosis, with BENNETT HENRY most likely.    Has been on dialysis, Cr stable at 2.95, anuric, confused.    Review of Systems:  Review of Systems   Unable to perform ROS: mental acuity   Constitutional: Negative for fever and chills.   Cardiovascular: Negative for chest pain and palpitations.       Physical Exam:  Physical Exam   Constitutional: She appears well-developed. She appears ill.   Cardiovascular: Normal rate and regular rhythm.    Pulmonary/Chest: Effort normal and breath sounds normal.   Abdominal: Soft. She exhibits distension.   Musculoskeletal: She exhibits edema. She exhibits no tenderness.   Neurological: She is alert.   Skin: Skin is warm and dry.       Labs:        Invalid input(s): [id number]      Recent Labs      06/28/17 0133 06/29/17 0144 06/30/17 0214   SODIUM  131*  136  133*   POTASSIUM  4.5  4.1  3.8   CHLORIDE  108  103  101   CO2  15*  22  27   BUN  69*  39*  29*   CREATININE  3.68*  2.90*  2.95*   MAGNESIUM  1.8   --    --    CALCIUM  8.7  8.1*  7.7*     Recent Labs      06/28/17 0133 06/29/17 0144 06/30/17 0214   ALTSGPT  104*  95*  93*   ASTSGOT  103*  96*  85*   ALKPHOSPHAT  107*  108*  112*   TBILIRUBIN  8.0*  8.5*  8.5*   GLUCOSE  80  67  93     Recent Labs      06/28/17 0132 06/29/17 0144 06/30/17 0214   RBC  2.67*  2.77*  2.70*   HEMOGLOBIN  9.0*  9.2*  9.1*   HEMATOCRIT  28.3*  28.7*  28.6*   PLATELETCT  95*  89*  64*   PROTHROMBTM  23.8*   --    --    INR  2.05*   --    --    IRON   --   47   --    FERRITIN   --   142.0   --    TOTIRONBC   --   154*   --      Recent Labs      06/28/17 0132 06/28/17 0133 06/29/17 0144 06/30/17 0214   WBC  10.7   --   13.3*  16.0*   NEUTSPOLYS  81.90*   --   85.20*   --    LYMPHOCYTES  6.50*   --   4.40*   --    MONOCYTES  9.80   --   9.50    --    EOSINOPHILS  0.90   --   0.20   --    BASOPHILS  0.40   --   0.30   --    ASTSGOT   --   103*  96*  85*   ALTSGPT   --   104*  95*  93*   ALKPHOSPHAT   --   107*  108*  112*   TBILIRUBIN   --   8.0*  8.5*  8.5*           Hemodynamics:  Temp (24hrs), Av.1 °C (97 °F), Min:35.8 °C (96.4 °F), Max:36.4 °C (97.5 °F)  Temperature: 36 °C (96.8 °F)  Pulse  Av.7  Min: 68  Max: 98   Blood Pressure : 139/74 mmHg     Respiratory:    Respiration: 16, Pulse Oximetry: 93 %        RUL Breath Sounds: Diminished, RML Breath Sounds: Diminished, RLL Breath Sounds: Diminished, SARAH Breath Sounds: Diminished, LLL Breath Sounds: Diminished  Fluids:    Intake/Output Summary (Last 24 hours) at 17 1341  Last data filed at 17 0600   Gross per 24 hour   Intake    400 ml   Output     50 ml   Net    350 ml     Weight: 79.9 kg (176 lb 2.4 oz)  GI/Nutrition:  Orders Placed This Encounter   Procedures   • DIET ORDER     Standing Status: Standing      Number of Occurrences: 1      Standing Expiration Date:      Order Specific Question:  Diet:     Answer:  2 Gram Sodium [7]      Comments:  Strict 1:1 feeding; crush meds in applesauce/pudding, please encourage PO intake     Order Specific Question:  Texture/Fiber modifications:     Answer:  Dysphagia 1(Pureed)specify fluid consistency(question 6) [1]     Order Specific Question:  Consistency/Fluid modifications:     Answer:  Thin Liquids [3]     Order Specific Question:  Miscellaneous modifications:     Answer:  SLP - 1:1 Supervision by Nursing [21]     Medical Decision Making, by Problem:  Active Hospital Problems    Diagnosis   • Upper GI bleed [K92.2]   • Bacteremia due to Escherichia coli [R78.81]   • Sepsis (CMS-HCC) [A41.9]   • Alcoholic cirrhosis of liver with ascites (CMS-HCC) [K70.31]   • Chronic hepatitis C virus infection (CMS-HCC) [B18.2]   • Acute kidney injury (CMS-HCC) [N17.9]   • Urinary tract infection [N39.0]   • Hyperbilirubinemia [E80.6]   •  Thrombocytopenia (CMS-HCC) [D69.6]   • Elevated troponin [R74.8]   • Abdominal pain [R10.9]   • Alcoholic hepatitis [K70.10]   • Excoriation of buttock [S30.810A]     1. BENNETT felt to be HENRY, vs ATN, no dialysis today  2. Hyponatremia, worse Na 133  3. Acidosis, improved  4. Anemia, Hgb 9.1    -Hold HD today  -Will follow   Core Measures

## 2017-06-30 NOTE — PROGRESS NOTES
Gastroenterology Progress Note     Author: Wesley Duggan   Date & Time Created: 6/30/2017 11:53 AM    Interval History:  GI Consults re: melena, cirrhosis    6/29/17: Remains confused.  RN states only green stools and no melena.  Hgb stable.  Getting dialysis.  WBC slightly up.  Denies abdominal pain.   6/30/17: Confusion improving but still slow and fatigued.  Spoke with  who states she has had general decline over several weeks and did have some possible mild confusion and dementia prior to arrival.  Also with poor PO intake at home.  She denies abdominal pain.  Notes some mild cough.  No melena or hematochezia.  WBC rising.  Hgb stable.  Liver tests unchanged.    Review of Systems:  Review of Systems   Unable to perform ROS: mental status change       Physical Exam:  Physical Exam   Constitutional: She appears well-developed.   Cardiovascular: Normal rate and regular rhythm.    Murmur heard.  Pulmonary/Chest: Effort normal and breath sounds normal. She has no wheezes. She has no rales.   Abdominal: Soft. Bowel sounds are normal. She exhibits distension. There is no tenderness.   Musculoskeletal: She exhibits edema.   Neurological: She is alert.   Skin: Skin is dry.       Labs:        Invalid input(s): [id number]      Recent Labs      06/28/17 0133 06/29/17 0144 06/30/17 0214   SODIUM  131*  136  133*   POTASSIUM  4.5  4.1  3.8   CHLORIDE  108  103  101   CO2  15*  22  27   BUN  69*  39*  29*   CREATININE  3.68*  2.90*  2.95*   MAGNESIUM  1.8   --    --    CALCIUM  8.7  8.1*  7.7*     Recent Labs      06/28/17 0133 06/29/17 0144 06/30/17 0214   ALTSGPT  104*  95*  93*   ASTSGOT  103*  96*  85*   ALKPHOSPHAT  107*  108*  112*   TBILIRUBIN  8.0*  8.5*  8.5*   GLUCOSE  80  67  93     Recent Labs      06/28/17 0132 06/29/17 0144 06/30/17 0214   RBC  2.67*  2.77*  2.70*   HEMOGLOBIN  9.0*  9.2*  9.1*   HEMATOCRIT  28.3*  28.7*  28.6*   PLATELETCT  95*  89*  64*   PROTHROMBTM   23.8*   --    --    INR  2.05*   --    --    IRON   --   47   --    FERRITIN   --   142.0   --    TOTIRONBC   --   154*   --      Recent Labs      17   0132  17   0133  17   0144  17   0214   WBC  10.7   --   13.3*  16.0*   NEUTSPOLYS  81.90*   --   85.20*   --    LYMPHOCYTES  6.50*   --   4.40*   --    MONOCYTES  9.80   --   9.50   --    EOSINOPHILS  0.90   --   0.20   --    BASOPHILS  0.40   --   0.30   --    ASTSGOT   --   103*  96*  85*   ALTSGPT   --   104*  95*  93*   ALKPHOSPHAT   --   107*  108*  112*   TBILIRUBIN   --   8.0*  8.5*  8.5*     Hemodynamics:  Temp (24hrs), Av.1 °C (97 °F), Min:35.8 °C (96.4 °F), Max:36.4 °C (97.5 °F)  Temperature: 36 °C (96.8 °F)  Pulse  Av.7  Min: 68  Max: 98   Blood Pressure : 139/74 mmHg     Respiratory:    Respiration: 16, Pulse Oximetry: 93 %        RUL Breath Sounds: Diminished, RML Breath Sounds: Diminished, RLL Breath Sounds: Diminished, SARAH Breath Sounds: Diminished, LLL Breath Sounds: Diminished  Fluids:    Intake/Output Summary (Last 24 hours) at 17 1052  Last data filed at 17 0600   Gross per 24 hour   Intake    320 ml   Output     30 ml   Net    290 ml     Weight: 79.9 kg (176 lb 2.4 oz)  GI/Nutrition:  Orders Placed This Encounter   Procedures   • DIET ORDER     Standing Status: Standing      Number of Occurrences: 1      Standing Expiration Date:      Order Specific Question:  Diet:     Answer:  2 Gram Sodium [7]      Comments:  Strict 1:1 feeding; crush meds in applesauce/pudding, please encourage PO intake     Order Specific Question:  Texture/Fiber modifications:     Answer:  Dysphagia 1(Pureed)specify fluid consistency(question 6) [1]     Order Specific Question:  Consistency/Fluid modifications:     Answer:  Thin Liquids [3]     Order Specific Question:  Miscellaneous modifications:     Answer:  SLP - 1:1 Supervision by Nursing [21]     Medical Decision Making, by Problem:  Active Hospital Problems    Diagnosis    • Upper GI bleed [K92.2]   • Bacteremia due to Escherichia coli [R78.81]   • Sepsis (CMS-HCC) [A41.9]   • Alcoholic cirrhosis of liver with ascites (CMS-HCC) [K70.31]   • Chronic hepatitis C virus infection (CMS-HCC) [B18.2]   • Acute kidney injury (CMS-HCC) [N17.9]   • Urinary tract infection [N39.0]   • Hyperbilirubinemia [E80.6]   • Thrombocytopenia (CMS-HCC) [D69.6]   • Elevated troponin [R74.8]   • Abdominal pain [R10.9]   • Alcoholic hepatitis [K70.10]   • Excoriation of buttock [S30.810A]     Impression:  1.  Melena x 1 episode although no recurrence and nursing notes blood from perianal wounds only  2.  Decompensated cirrhosis:  Alcohol and chronic hepatitis C.  PRASANNA neg.  Iron studies in am  3.  Abd pain.  No SBP on tap but already on Unasyn.  Pain resolved.  Suspect she probably had SBP as cause of bacteremia rather than UTI  3.  Ascites, large, as above  4.  BENNETT, likely contrast induced nephropathy or ATN, for HD today  5.  Confusion with normal ammonia.  Unclear baseline and possible dementia?  Started Xifaxan after lactulose was stopped yesterday.  Improving slowly   6.  Anemia, acute on chronic blood loss. Stable.  7.  Coagulopathy  8.  Thrombocytopenia  9. Protein calorie malnutrition, moderate to severe  10. Leukocytosis    Plan:  1. Continue antibiotics  2. Continue lactulose and Xifaxan.  3. Have concerns regarding nutrition which is minimal.  Discussed with  that nutrition is main beneficial therapy for her underlying liver disease.  He wants to try and work with her to improve PO intake.  If does not improve, would recommend placement of Coretrack for enteral nutrition  4. Recommend CXR, repeat blood cultures and paracentesis to rule out another infection given rising WBC  5. Continue pentoxifylline  6. Continue PPI  7. Still no need for urgent EGD but consider once clinical status improves prior to discharge to assess for varices  8.  Did discuss with  and her poor prognosis  overall given severity of underlying liver disease and age and now renal issues.    Medications reviewed and Labs reviewed

## 2017-06-30 NOTE — PROGRESS NOTES
Gastroenterology Progress Note     Author: Wesley Duggan   Date & Time Created: 6/29/2017 5:13 PM    Interval History:  GI Consults re: melena    6/29/17: Remains confused.  RN states only green stools and no melena.  Hgb stable.  Getting dialysis.  WBC slightly up.  Denies abdominal pain.    Review of Systems:  Review of Systems   Unable to perform ROS: mental status change       Physical Exam:  Physical Exam   Constitutional: She appears well-developed.   Cardiovascular: Normal rate and regular rhythm.    Murmur heard.  Pulmonary/Chest: Effort normal and breath sounds normal. She has no wheezes. She has no rales.   Abdominal: Soft. Bowel sounds are normal. She exhibits distension. There is no tenderness.   Musculoskeletal: She exhibits edema.   Neurological: She is alert.   Skin: Skin is dry.       Labs:        Invalid input(s): [id number]      Recent Labs      06/27/17 0307 06/28/17 0133 06/29/17 0144   SODIUM  132*  131*  136   POTASSIUM  4.2  4.5  4.1   CHLORIDE  108  108  103   CO2  17*  15*  22   BUN  60*  69*  39*   CREATININE  3.06*  3.68*  2.90*   MAGNESIUM   --   1.8   --    CALCIUM  8.6  8.7  8.1*     Recent Labs      06/27/17 0307 06/28/17 0133 06/29/17   0144   ALTSGPT  103*  104*  95*   ASTSGOT  104*  103*  96*   ALKPHOSPHAT  103*  107*  108*   TBILIRUBIN  8.3*  8.0*  8.5*   GLUCOSE  74  80  67     Recent Labs      06/27/17 0307 06/27/17 0812 06/28/17 0132 06/29/17   0144   RBC  2.72*  2.77*  2.67*  2.77*   HEMOGLOBIN  9.0*  9.2*  9.0*  9.2*   HEMATOCRIT  28.7*  29.0*  28.3*  28.7*   PLATELETCT  93*  101*  95*  89*   PROTHROMBTM  24.5*   --   23.8*   --    INR  2.13*   --   2.05*   --    IRON   --    --    --   47   FERRITIN   --    --    --   142.0   TOTIRONBC   --    --    --   154*     Recent Labs      06/27/17 0307 06/27/17 0812 06/28/17   0132  06/28/17   0133  06/29/17   0144   WBC  12.8*  12.5*  10.7   --   13.3*   NEUTSPOLYS  82.40*  82.20*  81.90*   --    85.20*   LYMPHOCYTES  6.30*  6.40*  6.50*   --   4.40*   MONOCYTES  9.20  9.50  9.80   --   9.50   EOSINOPHILS  0.90  0.90  0.90   --   0.20   BASOPHILS  0.30  0.40  0.40   --   0.30   ASTSGOT  104*   --    --   103*  96*   ALTSGPT  103*   --    --   104*  95*   ALKPHOSPHAT  103*   --    --   107*  108*   TBILIRUBIN  8.3*   --    --   8.0*  8.5*     Hemodynamics:  Temp (24hrs), Av.1 °C (97 °F), Min:35.9 °C (96.7 °F), Max:36.3 °C (97.3 °F)  Temperature: 35.9 °C (96.7 °F)  Pulse  Av.8  Min: 68  Max: 98   Blood Pressure : 113/91 mmHg     Respiratory:    Respiration: 20, Pulse Oximetry: 92 %        RUL Breath Sounds: Diminished, RML Breath Sounds: Diminished, RLL Breath Sounds: Diminished, SARAH Breath Sounds: Diminished, LLL Breath Sounds: Diminished  Fluids:    Intake/Output Summary (Last 24 hours) at 17 1052  Last data filed at 17 0600   Gross per 24 hour   Intake    320 ml   Output     30 ml   Net    290 ml     Weight: 90.8 kg (200 lb 2.8 oz)  GI/Nutrition:  Orders Placed This Encounter   Procedures   • DIET ORDER     Standing Status: Standing      Number of Occurrences: 1      Standing Expiration Date:      Order Specific Question:  Diet:     Answer:  Regular [1]     Medical Decision Making, by Problem:  Active Hospital Problems    Diagnosis   • Upper GI bleed [K92.2]   • Bacteremia due to Escherichia coli [R78.81]   • Sepsis (CMS-HCC) [A41.9]   • Alcoholic cirrhosis of liver with ascites (CMS-HCC) [K70.31]   • Chronic hepatitis C virus infection (CMS-HCC) [B18.2]   • Acute kidney injury (CMS-HCC) [N17.9]   • Urinary tract infection [N39.0]   • Hyperbilirubinemia [E80.6]   • Thrombocytopenia (CMS-HCC) [D69.6]   • Elevated troponin [R74.8]   • Abdominal pain [R10.9]   • Alcoholic hepatitis [K70.10]   • Excoriation of buttock [S30.810A]     Impression:  1.  Melena x 1 episode although no recurrence and nursing notes blood from perianal wounds only  2.  Decompensated cirrhosis:  Alcohol and chronic  hepatitis C.  PRASANNA neg.  Iron studies in am  3.  Abd pain.  No SBP on tap but already on Unasyn.  Pain resolved.  Suspect she probably had SBP as cause of bacteremia rather than UTI  3.  Ascites, large, as above  4.  BENNETT, likely contrast induced nephropathy or ATN, for HD today  5.  Confusion with normal ammonia.  Unclear baseline and possible dementia?  Started Xifaxan after lactulose was stopped yesterday   6.  Anemia, acute on chronic blood loss  7.  Coagulopathy  8.  Thrombocytopenia      Plan:  1. Stop octreotide drip  2. Continue antibiotics  3. Continue lactulose and Xifaxan  4. OK to continue Trental for now for possible alcoholic hepatitis given suspected infection and contraindication to steroids  5. May need EGD but not urgent and unclear if she truly had melena versus bleeding from perianal wounds.    Medications reviewed and Labs reviewed

## 2017-06-30 NOTE — THERAPY
"Speech Language Therapy dysphagia treatment completed.   Functional Status: Patient recommended for Dys3/thin liquid diet by SLP, but upgraded to regular diet yesterday by GI. Per RN and EMR, patient has had very poor PO intake. Patient awake, but with limited participation and poor participation despite education and encouragement. Patient with mostly uneaten breakfast tray at bedside. Patient agreeable to PO trials of one bite of watermelon, one bite of diced peach, a few bites of applesauce and pudding, and sips of water and Boost. Patient had significantly prolonged mastication on PO trials of watermelon and peaches. Patient required 2-3 swallows to clear residue and had prolonged oral holding on soft solids. PO trials of purees, pudding, and thin liquids resulted in much more timely initiation of swallow trigger and no overt s/sx of aspiration. Patient did not appear to fatigue nearly as quickly on purees and pudding either. Suspect decreased PO intake is at least partially d/t fatigue on solid foods. Recommend patient downgrade to Dys1/thin liquid diet with strict 1:1 feeding assistance. RN aware. SLP is following.     Recommendations: Recommend patient downgrade to Dys1/thin liquid diet with strict 1:1 feeding assistance. Crush meds in applesauce/pudding. Please encourage PO intake.   Plan of Care: Will benefit from Speech Therapy 3 times per week  Post-Acute Therapy: Discharge to a transitional care facility for continued skilled therapy services.    See \"Rehab Therapy-Acute\" Patient Summary Report for complete documentation.     "

## 2017-06-30 NOTE — PROGRESS NOTES
[facility] Interval Note    Name Abigail Bradley     1935   Age/Sex 82 y.o. female   MRN [id number]   Code Status DNAR     After 5PM or if no immediate response to page, please call for cross-coverage  Attending/Team: Dr. Ojeda/Melchor Use Tiger Text to page  6AM-5PM  Call [phone] to page afterhours   1st Call - Day Intern (R1):   Dr. Diaz 2nd Call - Day Sr. Resident (R2/R3):   Dr. Guthrie         Chief complaint/ reason for interval visit (Primary Diagnosis)   Fatigue; Abdominal pain; UTI; Sepsis    Interval Problem Daily Status Update    Patient had IR guided HD catheter yesterday and started on HD  Received HD today and tolerated well. Remains confused and intermittently pulling on lines  Bleeding noted today from decubitus ulcers       Review of Systems   Unable to perform ROS: mental acuity   Gastrointestinal: Negative for nausea, vomiting and abdominal pain.         Quality Measures  EKG reviewed, Labs reviewed, Medications reviewed and Radiology images reviewed  العراقي catheter: Critically Ill - Requiring Accurate Measurement of Urinary Output      DVT Prophylaxis: Contraindicated - High bleeding risk  DVT prophylaxis - mechanical: SCDs  Ulcer prophylaxis: Yes  Antibiotics: Treating active infection/contamination beyond 24 hours perioperative coverage            Physical Exam       Filed Vitals:    17 0811 17 1111 17 1155 17 1708   BP: 140/59 123/33 113/91 119/47   Pulse: 81 72 84 82   Temp: 36.3 °C (97.3 °F) 36.3 °C (97.3 °F) 35.9 °C (96.7 °F) 35.8 °C (96.4 °F)   Resp: 18 18 20 16   Height:       Weight:       SpO2:   92% 96%     Body mass index is 35.47 kg/(m^2). Weight: 90.8 kg (200 lb 2.8 oz)  Oxygen Therapy:  Pulse Oximetry: 96 %, O2 (LPM): 0, O2 Delivery: None (Room Air)    Physical Exam   Constitutional: She is oriented to person, place, and time. She appears jaundiced.   HENT:   Head: Normocephalic and atraumatic.    Eyes: EOM are normal. Pupils are equal, round, and reactive to light.   Cardiovascular: Normal rate, regular rhythm and normal heart sounds.    Pulmonary/Chest: Effort normal. She has no wheezes. She has no rales.   Abdominal: Bowel sounds are normal. She exhibits distension. There is no tenderness. There is no rebound and no guarding.   Neurological: She is alert and oriented to person, place, and time. GCS score is 15.         Lab Data Review:      6/22/2017  12:36 PM    Recent Labs      06/27/17 0307 06/28/17 0133 06/29/17 0144   SODIUM  132*  131*  136   POTASSIUM  4.2  4.5  4.1   CHLORIDE  108  108  103   CO2  17*  15*  22   BUN  60*  69*  39*   CREATININE  3.06*  3.68*  2.90*   MAGNESIUM   --   1.8   --    CALCIUM  8.6  8.7  8.1*       Recent Labs      06/27/17 0307 06/28/17 0133 06/29/17 0144   ALTSGPT  103*  104*  95*   ASTSGOT  104*  103*  96*   ALKPHOSPHAT  103*  107*  108*   TBILIRUBIN  8.3*  8.0*  8.5*   GLUCOSE  74  80  67       Recent Labs      06/27/17 0307 06/27/17 0812 06/28/17 0132 06/29/17 0144   RBC  2.72*  2.77*  2.67*  2.77*   HEMOGLOBIN  9.0*  9.2*  9.0*  9.2*   HEMATOCRIT  28.7*  29.0*  28.3*  28.7*   PLATELETCT  93*  101*  95*  89*   PROTHROMBTM  24.5*   --   23.8*   --    INR  2.13*   --   2.05*   --    IRON   --    --    --   47   FERRITIN   --    --    --   142.0   TOTIRONBC   --    --    --   154*       Recent Labs      06/27/17 0307 06/27/17 0812 06/28/17 0132 06/28/17 0133 06/29/17 0144   WBC  12.8*  12.5*  10.7   --   13.3*   NEUTSPOLYS  82.40*  82.20*  81.90*   --   85.20*   LYMPHOCYTES  6.30*  6.40*  6.50*   --   4.40*   MONOCYTES  9.20  9.50  9.80   --   9.50   EOSINOPHILS  0.90  0.90  0.90   --   0.20   BASOPHILS  0.30  0.40  0.40   --   0.30   ASTSGOT  104*   --    --   103*  96*   ALTSGPT  103*   --    --   104*  95*   ALKPHOSPHAT  103*   --    --   107*  108*   TBILIRUBIN  8.3*   --    --   8.0*  8.5*     Active Problems:     Hyperbilirubinemia POA: Yes    Alcoholic cirrhosis of liver with ascites (CMS-HCC) POA: Yes    Sepsis (CMS-HCC) POA: Yes    Abdominal pain POA: Yes    Urinary tract infection POA: Yes    Thrombocytopenia (CMS-HCC) POA: Yes      Alcoholic hepatitis POA: Yes    Excoriation of buttock POA: Yes    Elevated troponin POA: Yes    Acute kidney injury (CMS-HCC) POA: Yes  Resolved Problems:    * No resolved hospital problems. *      Assessment/Plan     Alcoholic cirrhosis of liver with ascites (CMS-HCC)- Decompensated   Likely alcohol related, HCV positive but low titer  Cirrhosis confirmed with USG, CT and MRI  Patient is child-juarez's class C and has a MELD score of 26 indicating high mortality.  Poor prognosis.    Alcoholic hepatitis  Patient's liver enzymes elevated but patient's last drink was a month ago.  Could be having alcoholic hepatitis since a month.  INR and T. Bili elevated   Maddrey's discriminant function is 60.6 which indicates steroid use to reduce mortality but it is unclear how much bilirubin is from the hepatitis vs biliary obstruction.  Steroids is contraindicated as patient is septic.  On pentoxifylline at renal dosing.  Also on rifaximin and lactulose. Ammonia has been stable since admission.  Gastroenterology following, appreciate recommendations.  MRCP does not reveal any obstruction.    Upper GI bleed  Patient noted to have black stools by RN.  Occult stool positive.  Most likely is having an occult bleed.  Gastroenterology following patient, no urgent need for EGD     Acute kidney injury (CMS-HCC)  Contrast induced nephropathy. However ATN or hepatorenal syndrome could be another possibility.   FeNa is 0.11% showing pre-renal.   Nephrologist following, appreciate recommendations.  Patient has had temp dialysis catheter placed.  Undergoing hemodialysis per nephro recommendations.  Kidney function slightly improved today    Sepsis/bacteremia  Blood cx + E.coli  Source SBP vs. UTI  Continue  unasyn    Thrombocytopenia (CMS-HCC)  Likely secondary to alcoholic liver disease or sepsis.  TEG study reveals a near normal coagulable state.  Continue to monitor.    Decubitus ulcer   Erythematous and oozing blood today; more than normal  Wound care consulted  Air foam mattress ordered   Need close monitoring       Hepatitis C antibody test positive  Likely has had exposure in the past.  HCR RNA PCR +     Consultants/Specialty  Surgery: Dr. Garibay  Gastroenterology: Dr. Grant  Nephrology: Dr. Bermudez    Disposition  Remain inpatient for further managment

## 2017-07-01 NOTE — PROGRESS NOTES
Hemodialysis ordered by Dr. Bone. Treatment started at 1116 and ended at 1416. One episode hypotension,  ml and 50 ml 12.5 gm 25 % Albumin given, provided good result. Pt stable, vss, no c/o post tx. See flow sheets for details. Net UF 1.6 liters. Report to ADDISON Saldaña RN.

## 2017-07-01 NOTE — PROGRESS NOTES
Gastroenterology Progress Note     Author: Wesley Medinaterman   Date & Time Created: 7/1/2017 9:26 AM    Interval History:  GI Consults re: melena, cirrhosis    6/29/17: Remains confused.  RN states only green stools and no melena.  Hgb stable.  Getting dialysis.  WBC slightly up.  Denies abdominal pain.   6/30/17: Confusion improving but still slow and fatigued.  Spoke with  who states she has had general decline over several weeks and did have some possible mild confusion and dementia prior to arrival.  Also with poor PO intake at home.  She denies abdominal pain.  Notes some mild cough.  No melena or hematochezia.  WBC rising.  Hgb stable.  Liver tests unchanged.  7/1: No new issues.  WBC rising.  No fevers.  Poor PO intake.  Some loose stools but on lactulose.    Review of Systems:  Review of Systems   Unable to perform ROS: mental status change       Physical Exam:  Physical Exam   Constitutional: She appears well-developed.   Cardiovascular: Normal rate and regular rhythm.    Murmur heard.  Pulmonary/Chest: Effort normal and breath sounds normal. She has no wheezes. She has no rales.   Abdominal: Soft. Bowel sounds are normal. She exhibits distension. There is no tenderness.   Musculoskeletal: She exhibits edema.   Neurological: She is alert.   Skin: Skin is dry.   Nursing note and vitals reviewed.      Labs:        Invalid input(s): [id number]      Recent Labs      06/29/17 0144 06/30/17 0214 07/01/17   0157   SODIUM  136  133*  133*   POTASSIUM  4.1  3.8  3.9   CHLORIDE  103  101  103   CO2  22  27  22   BUN  39*  29*  38*   CREATININE  2.90*  2.95*  3.11*   MAGNESIUM   --    --   1.9   CALCIUM  8.1*  7.7*  8.0*     Recent Labs      06/29/17 0144 06/30/17 0214 07/01/17   0157   ALTSGPT  95*  93*  88*   ASTSGOT  96*  85*  78*   ALKPHOSPHAT  108*  112*  116*   TBILIRUBIN  8.5*  8.5*  8.0*   GLUCOSE  67  93  92     Recent Labs      06/29/17 0144 06/30/17 0214 07/01/17   0157   RBC   2.77*  2.70*  2.78*   HEMOGLOBIN  9.2*  9.1*  9.3*   HEMATOCRIT  28.7*  28.6*  29.4*   PLATELETCT  89*  64*  52*   IRON  47   --    --    FERRITIN  142.0   --    --    TOTIRONBC  154*   --    --      Recent Labs      17   0144  17   0214  17   0157   WBC  13.3*  16.0*  18.8*   NEUTSPOLYS  85.20*   --   82.60*   LYMPHOCYTES  4.40*   --   4.30*   MONOCYTES  9.50   --   7.00   EOSINOPHILS  0.20   --   0.00   BASOPHILS  0.30   --   0.00   ASTSGOT  96*  85*  78*   ALTSGPT  95*  93*  88*   ALKPHOSPHAT  108*  112*  116*   TBILIRUBIN  8.5*  8.5*  8.0*     Hemodynamics:  Temp (24hrs), Av.3 °C (97.4 °F), Min:36 °C (96.8 °F), Max:36.7 °C (98 °F)  Temperature: 36.6 °C (97.9 °F)  Pulse  Av.4  Min: 63  Max: 98   Blood Pressure : 150/74 mmHg     Respiratory:    Respiration: 18, Pulse Oximetry: 93 %        RUL Breath Sounds: Diminished, RML Breath Sounds: Diminished, RLL Breath Sounds: Diminished, SARAH Breath Sounds: Diminished, LLL Breath Sounds: Diminished  Fluids:    Intake/Output Summary (Last 24 hours) at 17 1052  Last data filed at 17 0600   Gross per 24 hour   Intake    320 ml   Output     30 ml   Net    290 ml     Weight: 81.1 kg (178 lb 12.7 oz)  GI/Nutrition:  Orders Placed This Encounter   Procedures   • DIET ORDER     Standing Status: Standing      Number of Occurrences: 1      Standing Expiration Date:      Order Specific Question:  Diet:     Answer:  2 Gram Sodium [7]      Comments:  Strict 1:1 feeding; crush meds in applesauce/pudding, please encourage PO intake     Order Specific Question:  Texture/Fiber modifications:     Answer:  Dysphagia 1(Pureed)specify fluid consistency(question 6) [1]     Order Specific Question:  Consistency/Fluid modifications:     Answer:  Thin Liquids [3]     Order Specific Question:  Miscellaneous modifications:     Answer:  SLP - 1:1 Supervision by Nursing [21]     Medical Decision Making, by Problem:  Active Hospital Problems    Diagnosis   • Upper  GI bleed [K92.2]   • Bacteremia due to Escherichia coli [R78.81]   • Sepsis (CMS-HCC) [A41.9]   • Alcoholic cirrhosis of liver with ascites (CMS-HCC) [K70.31]   • Chronic hepatitis C virus infection (CMS-HCC) [B18.2]   • Acute kidney injury (CMS-HCC) [N17.9]   • Urinary tract infection [N39.0]   • Hyperbilirubinemia [E80.6]   • Thrombocytopenia (CMS-HCC) [D69.6]   • Elevated troponin [R74.8]   • Abdominal pain [R10.9]   • Alcoholic hepatitis [K70.10]   • Excoriation of buttock [S30.810A]     Impression:  1.  Melena x 1 episode although no recurrence and nursing notes blood from perianal wounds only  2.  Decompensated cirrhosis:  Alcohol and chronic hepatitis C.  PRASANNA neg.  Iron studies in am  3.  Abd pain.  No SBP on tap but already on Unasyn.  Pain resolved.  Suspect she probably had SBP as cause of bacteremia rather than UTI  3.  Ascites, large, as above  4.  BENNETT, likely contrast induced nephropathy or ATN, for HD today  5.  Confusion with normal ammonia.  Unclear baseline and possible dementia?  Started Xifaxan after lactulose was stopped yesterday.  Improving slowly   6.  Anemia, acute on chronic blood loss. Stable.  7.  Coagulopathy  8.  Thrombocytopenia  9. Protein calorie malnutrition, moderate to severe  10. Leukocytosis.  Question infectious source versus related to alcoholic hepatitis    Plan:  1. Continue antibiotics  2. Continue lactulose and Xifaxan.  3. Have concerns regarding nutrition which is minimal.  Discussed with  that nutrition is main beneficial therapy for her underlying liver disease.  He wants to try and work with her to improve PO intake.  If does not improve, would recommend placement of Coretrack for enteral nutrition  4. Recommend diagnostic paracentesis again and check C diff given rising WBC with bandemia  5. Continue pentoxifylline  6. Continue PPI  7. Still no need for urgent EGD but consider once clinical status improves prior to discharge to assess for varices  8.  Did  discuss with  and her poor prognosis overall given severity of underlying liver disease and age and now renal issues.    Medications reviewed, Labs reviewed and Radiology images reviewed

## 2017-07-01 NOTE — PROGRESS NOTES
Received report from nightshift RN, assumed care of patient. Patient is A&O x 1, bed alarm on. Patient educated on importance of calling if in need of assistance. Verbalizes understanding. Patient declines pain at this time, endorses fatigue. Patient updated on plan of care, voices no evidence of learning. Patient with soft restraints on bilateral upper extremities, applied appropriately. Will continue to monitor for safety and comfort.

## 2017-07-01 NOTE — PROGRESS NOTES
[facility] [place] [facility] Interval Note    Name Abigail Bradley     1935   Age/Sex 82 y.o. female   MRN [id number]   Code Status DNAR     After 5PM or if no immediate response to page, please call for cross-coverage  Attending/Team: Dr. Ojeda/Melchor Use Tiger Text to page  6AM-5PM  Call [phone] to page afterhours   1st Call - Day Intern (R1):   Dr. Diaz 2nd Call - Day Sr. Resident (R2/R3):   Dr. Guthrie         Chief complaint/ reason for interval visit (Primary Diagnosis)   Fatigue; Abdominal pain; UTI; Sepsis    Interval Problem Daily Status Update    Patient's mentation appears to have improved but is still not yet at baseline. She is undergoing hemodialysis almost every day. Expect turn around in [place] in 5-10 days. Hb is stable and GI bleed has likely resolved.    Patient's white count is elevated today, not suspecting any infection but if there are other signs will get blood cultures and do a repeat tap.      Review of Systems   Unable to perform ROS: mental acuity         Quality Measures  EKG reviewed, Labs reviewed, Medications reviewed and Radiology images reviewed  العراقي catheter: Critically Ill - Requiring Accurate Measurement of Urinary Output      DVT Prophylaxis: Contraindicated - High bleeding risk  DVT prophylaxis - mechanical: SCDs  Ulcer prophylaxis: Yes  Antibiotics: Treating active infection/contamination beyond 24 hours perioperative coverage            Physical Exam       Filed Vitals:    17 0739 17 1130 17 1600 17 1955   BP: 150/86 139/74 142/100 150/67   Pulse: 76 79 78 76   Temp: 36.2 °C (97.1 °F) 36 °C (96.8 °F) 36.2 °C (97.1 °F) 36.3 °C (97.4 °F)   Resp: 17 16 19 12   Height:       Weight:    81.1 kg (178 lb 12.7 oz)   SpO2: 95% 93% 94% 94%     Body mass index is 31.68 kg/(m^2). Weight: 81.1 kg (178 lb 12.7 oz)  Oxygen Therapy:  Pulse Oximetry: 94 %, O2 (LPM): 0, O2 Delivery: None (Room Air)    Physical Exam    Constitutional: She is oriented to person, place, and time. She appears jaundiced.   HENT:   Head: Normocephalic and atraumatic.   Eyes: EOM are normal. Pupils are equal, round, and reactive to light.   Cardiovascular: Normal rate, regular rhythm and normal heart sounds.    Pulmonary/Chest: Effort normal. She has no wheezes. She has no rales.   Abdominal: Bowel sounds are normal. She exhibits distension. There is no tenderness. There is no rebound and no guarding.   Neurological: She is alert and oriented to person, place, and time. GCS score is 15.         Lab Data Review:      6/22/2017  12:36 PM    Recent Labs      06/28/17 0133 06/29/17 0144 06/30/17 0214   SODIUM  131*  136  133*   POTASSIUM  4.5  4.1  3.8   CHLORIDE  108  103  101   CO2  15*  22  27   BUN  69*  39*  29*   CREATININE  3.68*  2.90*  2.95*   MAGNESIUM  1.8   --    --    CALCIUM  8.7  8.1*  7.7*       Recent Labs      06/28/17 0133 06/29/17 0144 06/30/17 0214   ALTSGPT  104*  95*  93*   ASTSGOT  103*  96*  85*   ALKPHOSPHAT  107*  108*  112*   TBILIRUBIN  8.0*  8.5*  8.5*   GLUCOSE  80  67  93       Recent Labs      06/28/17 0132 06/29/17 0144 06/30/17 0214   RBC  2.67*  2.77*  2.70*   HEMOGLOBIN  9.0*  9.2*  9.1*   HEMATOCRIT  28.3*  28.7*  28.6*   PLATELETCT  95*  89*  64*   PROTHROMBTM  23.8*   --    --    INR  2.05*   --    --    IRON   --   47   --    FERRITIN   --   142.0   --    TOTIRONBC   --   154*   --        Recent Labs      06/28/17 0132 06/28/17 0133 06/29/17 0144 06/30/17 0214   WBC  10.7   --   13.3*  16.0*   NEUTSPOLYS  81.90*   --   85.20*   --    LYMPHOCYTES  6.50*   --   4.40*   --    MONOCYTES  9.80   --   9.50   --    EOSINOPHILS  0.90   --   0.20   --    BASOPHILS  0.40   --   0.30   --    ASTSGOT   --   103*  96*  85*   ALTSGPT   --   104*  95*  93*   ALKPHOSPHAT   --   107*  108*  112*   TBILIRUBIN   --   8.0*  8.5*  8.5*     Active Problems:    Hyperbilirubinemia POA: Yes    Alcoholic  cirrhosis of liver with ascites (CMS-HCC) POA: Yes    Sepsis (CMS-HCC) POA: Yes    Abdominal pain POA: Yes    Urinary tract infection POA: Yes    Thrombocytopenia (CMS-HCC) POA: Yes      Alcoholic hepatitis POA: Yes    Excoriation of buttock POA: Yes    Elevated troponin POA: Yes    Acute kidney injury (CMS-HCC) POA: Yes  Resolved Problems:    * No resolved hospital problems. *      Assessment/Plan     Alcoholic cirrhosis of liver with ascites (CMS-HCC)  Likely alcohol related from chronic alcohol abuse vs hepatitis C.  Last drink was reportedly a month ago, so out of range for withdrawal.  Cirrhosis was identified on ultrasound.  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  Poor prognosis.  Underwent recent ascitic tap which did not show any infection.  Will consider another tap if patient is showing any signs of infection.    Alcoholic hepatitis  Patient's liver enzymes elevated but patient's last drink was a month ago.  Could be having alcoholic hepatitis since a month.  INR is elevated and T. Bili is at 10.  Maddrey's discriminant function is 60.6 which indicates steroid use to reduce mortality but it is unclear how much bilirubin is from the hepatitis vs biliary obstruction.  In any case starting steroids is contraindicated as patient is septic.  On pentoxifylline at renal dosing.  Also on rifaximin and lactulose. Ammonia has been stable since admission.  Gastroenterology following, appreciate recommendations.    Hyperbilirubinemia  T. Bili elevated at 10.  50% of the elevation is indirect bilirubin and 50% is direct bilirubin indicating intrahepatic process.  Likely secondary to alcoholic vs viral hepatitis and/or cirrhosis.  Will continue to monitor.  MRCP does not reveal any obstruction.    Abdominal pain  Unclear as the source of abdominal pain.  Initially cholecystitis was thought to be the etiology based on imaging findings but SBP could be a possibility  too.  GERD or alcoholic gastritis could be a possibility as well.  Resolved currently.    Urinary tract infection  UA revealed the presence of a UTI.  Currently has unasyn for coverage.  Urine cultures discarded as they were non-specific/    Thrombocytopenia (CMS-HCC)  Likely secondary to alcoholic liver disease or sepsis.  TEG study reveals a near normal coagulable state.  Continue to monitor.    Excoriation of buttock  Etiology from being bed bound.  Is erythematous and oozing.  Received one dose of vancomycin and then switched over to doxycycline for a day.  Doesn't appear to need antibiotics as it does not look infected.  Wound care consult in place.    Acute kidney injury (CMS-HCC)  Most likely etiology is contrast induced nephropathy. However ATN or hepatorenal syndrome could be another possibility.   FeNa is 0.11% showing pre-renal.   Urine eosonophils are negative.  Will repeat urine electrolytes.  Nephrologist Dr. Bermudez on board, appreciate recommendations.  Patient has had temp dialysis catheter placed.  Undergoing hemodialysis per nephro recommendations.    Elevated troponin  Troponins elevated at 0.07 and repeat measurement was at the same.  EKG was negative for ischemic changes.  Likely secondary to demand ischemia.  Monitor for cardiac symptoms.    Sepsis (CMS-HCC)  Source of infection is likely UTI.  Blood cultures identified E.coloi in 2/2 bottles but urine cultures are non-specific but showing enteric organisms.  Although lactic acid is elevated it could be type 2 lactic acidosis from liver failure.  Continue with IV maintenance fluids.  Continue with unasyn for a total of 10 days.    Upper GI bleed  Patient noted to have black stools by RN.  Occult stool positive.  Vitals are stable.  Serial H/Hs show minimal drop in Hb but has been stable.  Most likely is having an occult bleed.  Gastroenterology following patient, appreciate recommendations.    Hepatitis C antibody test positive  Likely has  had exposure in the past.  HCR RNA PCR indicates viral load.  May consider treatment down the line as an outpatient.          Consultants/Specialty  Surgery: Dr. Garibay  Gastroenterology: Dr. Grant  Nephrology: Dr. Bermudez    Disposition  Remain inpatient for further managment

## 2017-07-02 PROBLEM — R79.89 ELEVATED TROPONIN: Status: RESOLVED | Noted: 2017-01-01 | Resolved: 2017-01-01

## 2017-07-02 NOTE — PROGRESS NOTES
Dorota from Lab called with critical result of platelets 26 at 1430. Critical lab result read back to Dorota.   This critical lab is not significantly changed from previous result, patient is known to be in HIT. No current bleeding noted, critical value not called     Problem: Patient Care Overview (Adult)  Goal: Plan of Care Review  Outcome: Ongoing (interventions implemented as appropriate)    03/20/17 0520   Coping/Psychosocial Response Interventions   Plan Of Care Reviewed With patient   Patient Care Overview   Progress improving       Goal: Adult Individualization and Mutuality  Outcome: Ongoing (interventions implemented as appropriate)    03/20/17 0520   Individualization   Patient Specific Preferences None voiced at this time       Goal: Discharge Needs Assessment  Outcome: Ongoing (interventions implemented as appropriate)    03/20/17 0520   Discharge Needs Assessment   Concerns To Be Addressed denies needs/concerns at this time   Readmission Within The Last 30 Days no previous admission in last 30 days   Equipment Needed After Discharge none   Self-Care   Equipment Currently Used at Home cane, straight;walker, standard;wheelchair;glucometer;hospital bed   Living Environment   Transportation Available family or friend will provide         Problem: Pain, Acute (Adult)  Goal: Identify Related Risk Factors and Signs and Symptoms  Outcome: Ongoing (interventions implemented as appropriate)    03/20/17 0520   Pain, Acute   Related Risk Factors (Acute Pain) persistent pain;patient perception   Signs and Symptoms (Acute Pain) verbalization of pain descriptors       Goal: Acceptable Pain Control/Comfort Level  Outcome: Ongoing (interventions implemented as appropriate)    03/20/17 0520   Pain, Acute (Adult)   Acceptable Pain Control/Comfort Level making progress toward outcome         Problem: Fall Risk (Adult)  Goal: Identify Related Risk Factors and Signs and Symptoms  Outcome: Ongoing (interventions implemented as appropriate)    03/20/17 0520   Fall Risk   Fall Risk: Related Risk Factors age-related changes;gait/mobility problems;history of falls;fear of falling;fatigue/slow reaction   Fall Risk: Signs and Symptoms presence of risk factors       Goal: Absence of  Falls  Outcome: Ongoing (interventions implemented as appropriate)    03/20/17 0520   Fall Risk (Adult)   Absence of Falls making progress toward outcome

## 2017-07-02 NOTE — PROGRESS NOTES
[facility] [place]    Name Abigail Bradley     1935   Age/Sex 82 y.o. female   MRN [id number]   Code Status DNR     After 5PM or if no immediate response to page, please call for cross-coverage  Attending/Team: Dr. Ojeda/Melchor Use Tiger Text to page  6AM-5PM  Call [phone] to page afterhours   1st Call - Day Intern (R1):   Dr. Barrientos 2nd Call - Day Sr. Resident (R2/R3):   Dr. Bennett         Chief complaint/ reason for interval visit (Primary Diagnosis)   Abdominal Pain, UTI, Sepsis    Interval Problem Daily Status Update    Patient in alert but her mentation is not at her baseline. She did not have complaints. Denies any fever/chills/abdominal pain. Patient is getting HD today.       Review of Systems   Constitutional: Positive for malaise/fatigue. Negative for fever and chills.   HENT: Negative for ear pain and nosebleeds.    Eyes: Negative for blurred vision, double vision and photophobia.   Respiratory: Negative for cough, hemoptysis, shortness of breath and stridor.    Cardiovascular: Negative for chest pain, palpitations and claudication.   Gastrointestinal: Negative for nausea, vomiting and abdominal pain.   Genitourinary: Negative for dysuria, urgency and flank pain.   Musculoskeletal: Negative for myalgias, back pain and joint pain.   Skin: Negative for itching and rash.   Neurological: Positive for weakness. Negative for dizziness, tingling, seizures and headaches.   Psychiatric/Behavioral: Negative for depression and suicidal ideas. The patient is not nervous/anxious.        Consultants/Specialty  Nephrology  IR for Paracentesis    Disposition  Inpatient    Quality Measures  Medications reviewed and Labs reviewed                          Physical Exam       Filed Vitals:    17 1116 17 1416 17 1431 17 1523   BP: 145/61 101/53 113/71 134/60   Pulse: 76 81 78 79   Temp: 36 °C (96.8 °F) 35.3 °C (95.5 °F) 36.4 °C (97.6 °F)  36.3 °C (97.4 °F)   Resp: 18 18 19 19   Height:       Weight:       SpO2:   94% 91%     Body mass index is 31.68 kg/(m^2). Weight: 81.1 kg (178 lb 12.7 oz)  Oxygen Therapy:  Pulse Oximetry: 91 %, O2 (LPM): 0, O2 Delivery: None (Room Air)    Physical Exam   HENT:   Head: Normocephalic and atraumatic.   Eyes: Conjunctivae are normal. Pupils are equal, round, and reactive to light.   Neck: Normal range of motion. Neck supple. No JVD present. No tracheal deviation present. No thyromegaly present.   Cardiovascular: Normal rate, regular rhythm, normal heart sounds and intact distal pulses.  Exam reveals no friction rub.    No murmur heard.  Pulmonary/Chest: Effort normal. No respiratory distress. She has no wheezes. She exhibits no tenderness.   Abdominal: Bowel sounds are normal. She exhibits distension. There is tenderness. There is guarding. There is no rebound.   Musculoskeletal: She exhibits no edema or tenderness.   Neurological: No cranial nerve deficit. Coordination normal.   Skin: No rash noted. No erythema.         Lab Data Review:      7/1/2017  5:03 PM    Recent Labs      06/29/17 0144 06/30/17 0214 07/01/17 0157   SODIUM  136  133*  133*   POTASSIUM  4.1  3.8  3.9   CHLORIDE  103  101  103   CO2  22  27  22   BUN  39*  29*  38*   CREATININE  2.90*  2.95*  3.11*   MAGNESIUM   --    --   1.9   CALCIUM  8.1*  7.7*  8.0*       Recent Labs      06/29/17 0144 06/30/17 0214 07/01/17 0157   ALTSGPT  95*  93*  88*   ASTSGOT  96*  85*  78*   ALKPHOSPHAT  108*  112*  116*   TBILIRUBIN  8.5*  8.5*  8.0*   GLUCOSE  67  93  92       Recent Labs      06/29/17 0144 06/30/17 0214 07/01/17 0157   RBC  2.77*  2.70*  2.78*   HEMOGLOBIN  9.2*  9.1*  9.3*   HEMATOCRIT  28.7*  28.6*  29.4*   PLATELETCT  89*  64*  52*   IRON  47   --    --    FERRITIN  142.0   --    --    TOTIRONBC  154*   --    --        Recent Labs      06/29/17   0144  06/30/17   0214  07/01/17   0157   WBC  13.3*  16.0*  18.8*    NEUTSPOLYS  85.20*   --   82.60*   LYMPHOCYTES  4.40*   --   4.30*   MONOCYTES  9.50   --   7.00   EOSINOPHILS  0.20   --   0.00   BASOPHILS  0.30   --   0.00   ASTSGOT  96*  85*  78*   ALTSGPT  95*  93*  88*   ALKPHOSPHAT  108*  112*  116*   TBILIRUBIN  8.5*  8.5*  8.0*           Assessment/Plan     Alcoholic cirrhosis of liver with ascites (CMS-HCC) (present on admission)  Assessment & Plan  Likely alcohol related from chronic alcohol abuse vs hepatitis C.  Last drink was reportedly a month ago, so out of range for withdrawal.  Cirrhosis was identified on ultrasound.  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  Poor prognosis.  Underwent recent ascitic tap which did not show any infection.  Will undergo another tap today to assess for cause of increased WBC count    Sepsis (CMS-HCC) (present on admission)  Assessment & Plan  Source of infection is likely UTI.  Blood cultures identified E.coloi in 2/2 bottles on 06/21  WBC increased to 18.8 today from 16  Will consider other possible sources of infection, consulted IR to complete a paracentesis to assess for SBP  Will send a stool sample for WBC and C. Diff toxin  Will send repeat Blood Cultures to assess for bacteremia  Continue with IV maintenance fluids.  Continue with unasyn for a total of 10 days.    Upper GI bleed  Assessment & Plan  Patient noted to have black stools by RN.  Occult stool positive.  Vitals are stable.  Serial H/Hs show minimal drop in Hb but has been stable.  Most likely is having an occult bleed.  Gastroenterology following patient, appreciate recommendations.    Acute kidney injury (CMS-HCC) (present on admission)  Assessment & Plan  Most likely etiology is contrast induced nephropathy. However ATN or hepatorenal syndrome could be another possibility.   FeNa is 0.11% showing pre-renal.   Urine eosonophils are negative.  Will repeat urine electrolytes.  Nephrologist Dr. Bermudez on board,  appreciate recommendations.  Patient has had temp dialysis catheter placed.  Undergoing hemodialysis per nephro recommendations.    Urinary tract infection (present on admission)  Assessment & Plan  UA revealed the presence of a UTI.  Currently has unasyn for coverage.  Urine cultures discarded as they were non-specific    Hyperbilirubinemia (present on admission)  Assessment & Plan  T. Bili elevated at 10.  50% of the elevation is indirect bilirubin and 50% is direct bilirubin indicating intrahepatic process.  Likely secondary to alcoholic vs viral hepatitis and/or cirrhosis.  Will continue to monitor.  MRCP does not reveal any obstruction.    Thrombocytopenia (CMS-HCC) (present on admission)  Assessment & Plan  Likely secondary to alcoholic liver disease or sepsis.  TEG study reveals a near normal coagulable state.  Platelets went down again today to 52  Will continue to monitor    Hepatitis C antibody test positive (present on admission)  Assessment & Plan  Likely has had exposure in the past.  HCR RNA PCR indicates viral load.  May consider treatment down the line as an outpatient.    Altered mental status  Assessment & Plan  Likely multifactorial.  Combination of alcoholic cirrhosis vs renal dysfunction vs sundowning.  Will treat underlying cause.    Alcoholic hepatitis (present on admission)  Assessment & Plan  Patient's liver enzymes elevated but patient's last drink was a month ago.  Could be having alcoholic hepatitis since a month.  INR is elevated and T. Bili is at 10.  Maddrey's discriminant function is 60.6 which indicates steroid use to reduce mortality but it is unclear how much bilirubin is from the hepatitis vs biliary obstruction.  In any case starting steroids is contraindicated as patient is septic.  On pentoxifylline at renal dosing.  Also on rifaximin and lactulose. Ammonia has been stable since admission.  Gastroenterology following, appreciate recommendations.    Excoriation of buttock  (present on admission)  Assessment & Plan  Etiology from being bed bound.  Is erythematous and oozing.  Received one dose of vancomycin and then switched over to doxycycline for a day.  Doesn't appear to need antibiotics as it does not look infected.  Wound care consult in place.    Elevated troponin (present on admission)  Assessment & Plan  Troponins elevated at 0.07 and repeat measurement was at the same.  EKG was negative for ischemic changes.  Likely secondary to demand ischemia.  Monitor for cardiac symptoms.

## 2017-07-02 NOTE — PROGRESS NOTES
Nephrology Progress Note, Adult, Complex               Author: Deisy Smitha  Date & Time created: 7/2/2017  2:12 PM     Interval History:  82 year old with Hep C, cirrhosis, with BENNETT  -HENRY most likely.  Less confused  No complaints  (+) diarrhea (receiving lactulose)    Review of Systems:  Review of Systems   Constitutional: Positive for malaise/fatigue. Negative for fever and chills.   HENT: Negative.    Eyes: Negative.    Respiratory: Negative for cough, shortness of breath and wheezing.    Cardiovascular: Positive for leg swelling. Negative for chest pain, palpitations and orthopnea.   Gastrointestinal: Negative for nausea, vomiting and abdominal pain.   Skin: Negative.    Neurological: Positive for weakness.       Physical Exam:  Physical Exam   Constitutional: She appears well-developed and well-nourished. She appears ill. No distress.   HENT:   Head: Normocephalic and atraumatic.   Mouth/Throat: Oropharynx is clear and moist.   Eyes: Conjunctivae and EOM are normal. Pupils are equal, round, and reactive to light.   Neck: Normal range of motion. Neck supple.   Cardiovascular: Normal rate and regular rhythm.    Pulmonary/Chest: Effort normal and breath sounds normal. No respiratory distress. She has no wheezes. She has no rales.   Abdominal: Soft. She exhibits distension. There is no tenderness.   Musculoskeletal: She exhibits edema. She exhibits no tenderness.   Neurological: She is alert.   Skin: Skin is warm and dry.       Labs:        Invalid input(s): [id number]      Recent Labs      06/30/17 0214 07/01/17 0157 07/02/17   0326   SODIUM  133*  133*  135   POTASSIUM  3.8  3.9  3.7   CHLORIDE  101  103  105   CO2  27  22  24   BUN  29*  38*  32*   CREATININE  2.95*  3.11*  2.57*   MAGNESIUM   --   1.9   --    CALCIUM  7.7*  8.0*  8.1*     Recent Labs      06/30/17   0214  07/01/17 0157 07/02/17   0326   ALTSGPT  93*  88*  78*   ASTSGOT  85*  78*  70*   ALKPHOSPHAT  112*  116*  117*   TBILIRUBIN   8.5*  8.0*  7.9*   GLUCOSE  93  92  96     Recent Labs      17   0214  17   0157  17   0326   RBC  2.70*  2.78*  2.67*   HEMOGLOBIN  9.1*  9.3*  8.9*   HEMATOCRIT  28.6*  29.4*  29.0*   PLATELETCT  64*  52*  30*     Recent Labs      17   0214  17   0157  17   0326   WBC  16.0*  18.8*  15.6*   NEUTSPOLYS   --   82.60*  93.80*   LYMPHOCYTES   --   4.30*  0.90*   MONOCYTES   --   7.00  3.50   EOSINOPHILS   --   0.00  0.90   BASOPHILS   --   0.00  0.00   ASTSGOT  85*  78*  70*   ALTSGPT  93*  88*  78*   ALKPHOSPHAT  112*  116*  117*   TBILIRUBIN  8.5*  8.0*  7.9*           Hemodynamics:  Temp (24hrs), Av.2 °C (97.2 °F), Min:35.3 °C (95.5 °F), Max:37 °C (98.6 °F)  Temperature: 37 °C (98.6 °F)  Pulse  Av.1  Min: 63  Max: 98   Blood Pressure : 110/39 mmHg     Respiratory:    Respiration: 19, Pulse Oximetry: 91 %        RUL Breath Sounds: Diminished, RML Breath Sounds: Diminished, RLL Breath Sounds: Diminished, SARAH Breath Sounds: Diminished, LLL Breath Sounds: Diminished  Fluids:    Intake/Output Summary (Last 24 hours) at 17 1412  Last data filed at 17 0800   Gross per 24 hour   Intake   1050 ml   Output   6900 ml   Net  -5850 ml     Weight: 75.8 kg (167 lb 1.7 oz)  GI/Nutrition:  Orders Placed This Encounter   Procedures   • DIET ORDER     Standing Status: Standing      Number of Occurrences: 1      Standing Expiration Date:      Order Specific Question:  Diet:     Answer:  2 Gram Sodium [7]      Comments:  Strict 1:1 feeding; crush meds in applesauce/pudding, please encourage PO intake     Order Specific Question:  Texture/Fiber modifications:     Answer:  Dysphagia 1(Pureed)specify fluid consistency(question 6) [1]     Order Specific Question:  Consistency/Fluid modifications:     Answer:  Thin Liquids [3]     Order Specific Question:  Miscellaneous modifications:     Answer:  SLP - 1:1 Supervision by Nursing [21]     Medical Decision Making, by Problem:  Active  Hospital Problems    Diagnosis   • Upper GI bleed [K92.2]   • Bacteremia due to Escherichia coli [R78.81]   • Sepsis (CMS-HCC) [A41.9]   • Alcoholic cirrhosis of liver with ascites (CMS-HCC) [K70.31]   • Chronic hepatitis C virus infection (CMS-HCC) [B18.2]   • Acute kidney injury (CMS-HCC) [N17.9]   • Urinary tract infection [N39.0]   • Hyperbilirubinemia [E80.6]   • Thrombocytopenia (CMS-HCC) [D69.6]   • Elevated troponin [R74.8]   • Abdominal pain [R10.9]   • Alcoholic hepatitis [K70.10]   • Excoriation of buttock [S30.810A]     1. BENNETT/HD -poor UOP -continue dialysis 3 x a week  2. Hyponatremia: normalized  3. Acidosis: corrected  4. Anemia, Hgb slightly worse -to monitor  Recs;  -HD  -will schedule 3 x a week  -monitor BMP  -diet renal  -all meds to renal doses  -Will follow   Labs reviewed and Medications reviewed

## 2017-07-02 NOTE — DISCHARGE SUMMARY
[facility] Internal Medicine Discharge Summary      Admit Date:  6/21/2017       Discharge Date:   07/14 2017    Service:   R Internal Medicine [place] Team  Attending Physician(s):   Dr. Crain       Senior Resident(s):   Dr. chowdhury  Ken Resident(s):   Dr Walsh      Primary Diagnosis:   Alcoholic/hep C related cirrhosis  Sepsis secondary to urinary tract infection, and spontaneous bacterial peritonitis  Contrast induced nephropathy, s/p dialysis.   Chose hospice care on 07/13      Secondary Diagnoses:                  Thrombocytopenia (CMS-HCC) POA: Yes    Altered mental status POA: yes.     Incontinence associated dermatitis POA: Unknown    Sepsis (CMS-HCC) POA: Yes    Bacteremia due to Escherichia coli POA: Yes    Abdominal pain POA: Yes    H/P note written by Dr. Diaz on 06/21  She has a history of Hepatits C and alcoholism which has led to liver cirrhosis. She did undergo a CT scan of the abdomen with contrast on 6/23/2017 at 1634. After this, the patient has had progressive increase in her serum creatinine level. She has a history of taking Motrin as an outpatient. Yesterday, she had a marked decrease in her urine output to 150 mL. We are consulted for management.    No history of renal failure in the past. No history of dialysis, no history of family kidney problems. The patient now carries a diagnosis of alcoholic cirrhosis and alcoholic hepatitis. She did have acute kidney injury which initially improved with IV fluids but then continually worse. The FeNa is less than 1%.    Hospital Summary (Brief Narrative):       Ms. Arrington is a 82 year old female with no significant past medical history presents to the ED with complaints of abdominal pain, fatigue and lethargy. She does note have a primary care physician and hasn't seen a physician in several years. She was not on any medication prior to admission.    On admission, she was found to have a urinary tract infection the cultures which were discarded  due to contamination. Blood cultures however grew pan sensitive E. Coli, treated with initially zosyn and later transitioned to unasyn (06/23). Repeat blood cultures were negative.    She also showed to have alcoholic hepatitis with Maddrey's score as high as 60. Steroids were contraindicated due to the presence of sepsis so pentoxyfilline was started. Gastroenterology were on board and rifaximin was also added. A ascitic tap was done which revealed no signs of infection. Due to high WBC and abd fullness, Tap repeated on 07/01, which showed SBP with Poly WBC ~ 400. Antibitoic was changed from Unasyn (was for her UTI) to Zosyn (albumin also given), Vanco also started to covered VRE. Zosyn and Vanco had been given between 07/02 to 07/13. Discontinued after changing to hospice. One palliative drain of ascites done on 07/13.     Patient has had CT with contrast on 6/22/2017 after which patient's renal function started worsening and did not respond to IV fluid resuscitation. Nephrology were consulted and patient was determined to have contrast induced nephropathy. Hemodialysis was started after placement of temporary dialysis catheter. Nephro team has been on board for daily evaluation and treatment. Patient's renal function did not recover, and dialysis is discontinued after changing to hospice.     The patient had low PLT during 07/02-07/06, HIT was excluded by negative serotonin assay test. We believe her low PLT is related to sepsis on top of liver cirrhosis. The patient's PLT slowly came up.     Multiple thorough discussion done by IM team, palliative team and the patient/her . They chose home hospice on 07/12-07/13. The patient was sent home from hospital on 07/14.           Patient /Hospital Summary (Details -- Problem Oriented) :          Alcoholic/hep C related cirrhosis of liver with ascites (CMS-HCC)  Cirrhosis was identified on ultrasound/CAT scan.   Patient is child-juarez's class B-C and has a MELD  score of 26 indicating high mortality.  GI team had been on board. Terminal liver status could not be reversed.     Sepsis (CMS-HCC)  Source of infection is likely UTI and SBP.   Unasyn, Zosyn + Vanco (07/02-07/13), partially control her sepsis.     Upper GI bleed  Occult stool positive.  Serial H/Hs show minimal drop in Hb but has been stable.  Gastroenterology consulted, No intervention for now due to benefit/risk.   No evidence of active GI bleeding on the day of discharge.     Acute kidney injury (CMS-HCC)  Most likely etiology is contrast induced nephropathy. However ATN or hepatorenal syndrome could be another possibility.   FeNa is 0.11% showing pre-renal.   Urine eosonophils are negative.  Had been on dialysis due to poor urine output. Dialysis is discontinued on the day of dischrge.       Thrombocytopenia (CMS-HCC)  Assessment & Plan  Likely secondary to alcoholic liver disease or sepsis.  TEG study reveals a near normal coagulable state.  HIT serotonin study neg.     Incontinence associated dermatitis  Etiology from being bed bound.   Is erythematous and no bleeding.   Wound team has been helping, continue supportive care.       Consultants:     [place]: Dr. Garibay  Nephrology  Gastroenterology: Dr. Grant/Dr. Duggan/Dr. Osborne.     Procedures:        06/27, 07/01, 07/13 ascites tapping.   HD catheter on 06/28      Imaging/ Testing:      US-PARACENTESIS, ABD WITH IMAGING   Final Result      1. Ultrasound-guided therapeutic paracentesis of the right lower quadrant of the abdominal wall.      2. 4150 mL of fluid withdrawn.      DX-CHEST-PORTABLE (1 VIEW)   Final Result      Scattered linear atelectasis.      IR-CVC NON TUNNELED > AGE 5   Final Result      1. ULTRASOUND AND FLUOROSCOPIC GUIDED PLACEMENT OF A RIGHT INTERNAL JUGULAR 12 Lithuanian MAHURKAR TRIPLE LUMEN NON-TUNNELED HEMODIALYSIS CATHETER.      2. THE HEMODIALYSIS CATHETER MAY BE USED IMMEDIATELY AS CLINICALLY INDICATED. FLUSHES PER PROTOCOL.       US-RENAL   Final Result      1.  No hydronephrosis or solid or cystic renal mass.      2.  Increased cortical echogenicity bilaterally may represent senescent kidneys or medical renal disease.      3.  Normal appearance of the bladder.      4.  Small amount of ascites again noted.      [id number]-W/O   Final Result      1.  Hepatic cirrhosis and moderate to large ascites with diffuse mesenteric and body wall edema.   2.  No biliary dilation.   3.  Small cystic structure within the pancreatic head, uncertain significance.   4.  Pancreatic body and tail are not well-visualized and may be absent.   5.  Probable gallbladder sludge.      ECHOCARDIOGRAM COMP W/O CONT   Final Result      CT-ABDOMEN-PELVIS WITH   Final Result      1.  Hepatocellular disease/cirrhosis.   2.  Cysts and low density lesions within liver with the latter incompletely characterized.   3.  Recanalized umbilical vein and gastroesophageal and splenorenal varices.   4.  Large ascites and anasarca.   5.  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 1.3 cm cyst and low density nodule in the head of the pancreas with the latter incompletely characterized.   6.  No hydronephrosis.   7.  No evidence of bowel obstruction.   8.  Atherosclerotic changes.      [id number] VIEW   Final Result      No acute findings.      US-GALLBLADDER   Final Result         1. Cirrhotic appearing liver.      2. Gallbladder sludge.      3. Perihepatic ascites.      4. Nonspecific echogenic pancreas.      CT-HEAD W/O   Final Result      1.  Diffuse atrophy and white matter changes.   2.  No acute intracranial hemorrhage or territorial infarct.   3.  Chronic RIGHT temporal encephalomalacia with compensatory dilation of temporal horn RIGHT lateral ventricle.      DX-CHEST-PORTABLE (1 VIEW)   Final Result      1.  Mild cardiomegaly.   2.  No pneumonia or pulmonary edema.            Discharge Medications:         Medication Reconciliation:  Completed     Medication List      START taking these medications       Instructions    lorazepam 2 MG/ML Conc   Commonly known as:  ATIVAN   Next Dose Due:  As needed for agitation      Take 0.5 mL by mouth every 8 hours as needed (for agitation).   Dose:  1 mg       morphine 20 MG/ML Soln   Commonly known as:  ROXANOL   Next Dose Due:  As needed for pain    Take 0.5 mL by mouth every 6 hours as needed.   Dose:  10 mg         STOP taking these medications          B COMPLEX PO       CALCIUM PO       ibuprofen 200 MG Tabs   Commonly known as:  MOTRIN               Disposition:   Home hospice.     Diet:   Hospice patient, no restriction.     Activity:   Per hospice policy     Instructions:       The patient was instructed to return to the ER in the event of worsening symptoms. I have counseled the patient on the importance of compliance and the patient has agreed to proceed with all medical recommendations and follow up plan indicated above.   The patient understands that all medications come with benefits and risks. Risks may include permanent injury or death and these risks can be minimized with close reassessment and monitoring.        Primary Care Provider:    No PCP, currently under hospice policy    Discharge summary faxed to primary care provider:  Deferred  Copy of discharge summary given to the patient: Deferred    Follow up appointment details :      None    Pending Studies:        none    Time spent on discharge day patient visit, preparing discharge paperwork and arranging for patient follow up.    Summary of follow up issues:   None    Discharge Time (Minutes) :    1hr      Condition on Discharge    ______________________________________________________________________    Interval history/exam for day of discharge:    Go home with home hospice on 07/14 due to her advanced/terminal liver and kidney diseases.     Filed Vitals:    07/13/17 2010 07/14/17 0009 07/14/17 0409 07/14/17 0823   BP: 132/62 117/67  141/58 133/55   Pulse: 87 55 88 75   Temp: 36.6 °C (97.9 °F) 36.2 °C (97.2 °F) 36.3 °C (97.4 °F) 36.2 °C (97.1 °F)   Resp: 18 16 16 18   Height:       Weight: 66.2 kg (145 lb 15.1 oz)      SpO2: 96% 92% 95% 95%     Weight/BMI: Body mass index is 25.86 kg/(m^2).  Pulse Oximetry: 95 %, O2 (LPM): 0, O2 Delivery: None (Room Air)    HEENT: Jaundice  Cardiovascular: Normal heart rate, Normal rhythm   Lungs: Some decreased breathing sound at both lower lungs   Abdomen: No pain, some fullness  Skin: Pitting edema at leg and jaundice.  Neurologic: Alert & oriented x 1, Normal motor function, Normal sensory function, No focal deficits noted, cranial nerves II through XII are normal  PSY: stable mood.         Most Recent Labs:    Lab Results   Component Value Date/Time    WBC 13.5* 07/11/2017 03:21 AM    RBC 2.39* 07/11/2017 03:21 AM    HEMOGLOBIN 8.0* 07/11/2017 03:21 AM    HEMATOCRIT 25.6* 07/11/2017 03:21 AM    .1* 07/11/2017 03:21 AM    MCH 33.5* 07/11/2017 03:21 AM    MCHC 31.3* 07/11/2017 03:21 AM    MPV 11.6 07/11/2017 03:21 AM    NEUTROPHILS-POLYS 83.40* 07/11/2017 03:21 AM    LYMPHOCYTES 5.30* 07/11/2017 03:21 AM    MONOCYTES 8.90 07/11/2017 03:21 AM    EOSINOPHILS 1.60 07/11/2017 03:21 AM    BASOPHILS 0.10 07/11/2017 03:21 AM    HYPOCHROMIA 1+ 07/06/2017 03:17 AM    ANISOCYTOSIS 2+ 07/09/2017 03:26 AM      Lab Results   Component Value Date/Time    SODIUM 140 07/11/2017 03:21 AM    POTASSIUM 3.8 07/11/2017 03:21 AM    CHLORIDE 106 07/11/2017 03:21 AM    CO2 23 07/11/2017 03:21 AM    GLUCOSE 104* 07/11/2017 03:21 AM    BUN 45* 07/11/2017 03:21 AM    CREATININE 2.85* 07/11/2017 03:21 AM      Lab Results   Component Value Date/Time    ALT(SGPT) 70* 07/11/2017 03:21 AM    AST(SGOT) 93* 07/11/2017 03:21 AM    ALKALINE PHOSPHATASE 172* 07/11/2017 03:21 AM    TOTAL BILIRUBIN 6.3* 07/11/2017 03:21 AM    DIRECT BILIRUBIN 5.9* 06/21/2017 05:35 AM    LIPASE 159* 06/21/2017 05:57 AM    ALBUMIN 2.9* 07/11/2017 03:21 AM     GLOBULIN 4.3* 07/11/2017 03:21 AM    INR 2.05* 06/28/2017 01:32 AM    MACROCYTOSIS 2+ 07/09/2017 03:26 AM     Lab Results   Component Value Date/Time    PT 23.8* 06/28/2017 01:32 AM    INR 2.05* 06/28/2017 01:32 AM

## 2017-07-02 NOTE — OR SURGEON
Immediate Post-Operative Note    PROCEDURE:  Informed consent was obtained. A timeout was taken. Ascites was localized with real-time ultrasound. The left lower quadrant of the abdominal wall was prepared and draped in a sterile manner. Following local anesthesia with 1% lidocaine, a 5 K[place] Yueh pigtail catheter was advanced into the peritoneal cavity with trocar technique. 4150 cc of ascites was drained. A sample was sent for laboratory analysis. The patient tolerated the procedure well with no evidence of complication.          7/1/2017 5:58 PM Mo Prasad

## 2017-07-02 NOTE — PROGRESS NOTES
[facility] Medicine Interval Note    Name Abigail Bradley     1935   Age/Sex 82 y.o. female   MRN [id number]   Code Status DNR     After 5PM or if no immediate response to page, please call for cross-coverage  Attending/Team: Dr. Ojeda/Melchor Use Tiger Text to page  6AM-5PM  Call [phone] to page afterhours   1st Call - Day Intern (R1):   Dr. Barrientos 2nd Call - Day Sr. Resident (R2/R3):   Dr. Bennett         Chief complaint/ reason for interval visit (Primary Diagnosis)   Abdominal Pain, UTI, Sepsis    Interval Problem Daily Status Update    Patient was more awake and alert this AM but still not at her baseline. She has periods where she does not respond to questions. She had a paracentesis yesterday with IR and they drained 4L. She does state that she feels better after the paracentesis. Also had dialysis yesterday and took about 2.5L.       Review of Systems   Constitutional: Positive for malaise/fatigue. Negative for fever, chills and diaphoresis.   HENT: Negative for ear discharge, ear pain, hearing loss and nosebleeds.    Eyes: Negative for blurred vision, double vision and photophobia.   Respiratory: Negative for cough and hemoptysis.    Cardiovascular: Positive for leg swelling. Negative for chest pain, palpitations and claudication.   Gastrointestinal: Negative for heartburn, nausea, vomiting and diarrhea.   Genitourinary: Negative for dysuria, urgency, frequency and hematuria.   Musculoskeletal: Negative for myalgias, back pain and neck pain.   Skin: Negative for itching and rash.        Slight bruising in the epigastric region above the umbilicus   Neurological: Positive for weakness. Negative for dizziness, tingling, sensory change, focal weakness, seizures and headaches.   Psychiatric/Behavioral: Negative for depression, suicidal ideas and hallucinations. The patient is nervous/anxious.        Consultants/Specialty  GI  Nephro      Quality  Measures  Medications reviewed and Labs reviewed                          Physical Exam       Filed Vitals:    07/02/17 0406 07/02/17 0858 07/02/17 1126 07/02/17 1608   BP: 135/57 135/59 110/39 147/62   Pulse: 73 73 77 83   Temp: 36.1 °C (97 °F) 36.1 °C (97 °F) 37 °C (98.6 °F) 36.2 °C (97.1 °F)   Resp: 18 19 19 20   Height:       Weight:       SpO2: 94% 94% 91% 94%     Body mass index is 29.61 kg/(m^2). Weight: 75.8 kg (167 lb 1.7 oz)  Oxygen Therapy:  Pulse Oximetry: 94 %, O2 (LPM): 0, O2 Delivery: None (Room Air)    Physical Exam   HENT:   Head: Normocephalic and atraumatic.   Eyes: Conjunctivae and EOM are normal. Pupils are equal, round, and reactive to light.   Neck: Normal range of motion. Neck supple. No JVD present. No tracheal deviation present. No thyromegaly present.   Cardiovascular: Normal rate and regular rhythm.    Pulmonary/Chest: Effort normal and breath sounds normal. No stridor. No respiratory distress. She has no wheezes. She exhibits no tenderness.   Abdominal: She exhibits distension. There is no tenderness. There is no rebound and no guarding.   Musculoskeletal: She exhibits no edema or tenderness.   Neurological: No cranial nerve deficit. Gait normal.   Skin: No rash noted. There is erythema (erythema on the epigastric region above umbilicus).   Psychiatric: Mood and affect normal.         Lab Data Review:      7/2/2017  4:59 PM    Recent Labs      06/30/17 0214 07/01/17 0157 07/02/17   0326   SODIUM  133*  133*  135   POTASSIUM  3.8  3.9  3.7   CHLORIDE  101  103  105   CO2  27  22  24   BUN  29*  38*  32*   CREATININE  2.95*  3.11*  2.57*   MAGNESIUM   --   1.9   --    CALCIUM  7.7*  8.0*  8.1*       Recent Labs      06/30/17 0214 07/01/17 0157 07/02/17   0326   ALTSGPT  93*  88*  78*   ASTSGOT  85*  78*  70*   ALKPHOSPHAT  112*  116*  117*   TBILIRUBIN  8.5*  8.0*  7.9*   GLUCOSE  93  92  96       Recent Labs      07/01/17   0157  07/02/17   0326  07/02/17   1413   RBC  2.78*   2.67*  2.30*   HEMOGLOBIN  9.3*  8.9*  7.8*   HEMATOCRIT  29.4*  29.0*  25.2*   PLATELETCT  52*  30*  26*       Recent Labs      06/30/17   0214  07/01/17   0157  07/02/17   0326  07/02/17   1413   WBC  16.0*  18.8*  15.6*  13.8*   NEUTSPOLYS   --   82.60*  93.80*   --    LYMPHOCYTES   --   4.30*  0.90*   --    MONOCYTES   --   7.00  3.50   --    EOSINOPHILS   --   0.00  0.90   --    BASOPHILS   --   0.00  0.00   --    ASTSGOT  85*  78*  70*   --    ALTSGPT  93*  88*  78*   --    ALKPHOSPHAT  112*  116*  117*   --    TBILIRUBIN  8.5*  8.0*  7.9*   --            Assessment/Plan     Alcoholic cirrhosis of liver with ascites (CMS-HCC) (present on admission)  Assessment & Plan  Likely alcohol related from chronic alcohol abuse vs hepatitis C.  Last drink was reportedly a month ago, so out of range for withdrawal.  Cirrhosis was identified on ultrasound.  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  Poor prognosis.  Underwent recent ascitic tap which did not show any infection.  Underwent a paracentesis with IR on 7/1 which drained 4L, specimen sent for culture  Fluid showed 500 WBC with 86% neutrophils    Sepsis (CMS-HCC) (present on admission)  Assessment & Plan  Source of infection is likely UTI.  Blood cultures identified E.coloi in 2/2 bottles on 06/21  WBC decreased today from 18.8 to 15.6  Results from ascites fluid culture pending, Gram stain only shows WBCs, no organisms  C diff PCR toxin taken on 7/1 are negative  Blood cultures taken on 06/30 were negative   Continue with IV maintenance fluids  Changed antibiotic overage from Unasyn to Zosyn for better coverage and added Vancomycin      Upper GI bleed  Assessment & Plan  Patient noted to have black stools by RN.  Occult stool positive.  Vitals are stable.  Serial H/Hs show minimal drop in Hb but has been stable.  Most likely is having an occult bleed.  Gastroenterology following patient, appreciate  recommendations.    Acute kidney injury (CMS-HCC) (present on admission)  Assessment & Plan  Most likely etiology is contrast induced nephropathy. However ATN or hepatorenal syndrome could be another possibility.   FeNa is 0.11% showing pre-renal.   Urine eosonophils are negative.  Will repeat urine electrolytes.  Nephrologist Dr. Bermudez on board, appreciate recommendations.  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  Changed from unasyn to zosyn for coverage  Urine cultures discarded as they were non-specific    Hyperbilirubinemia (present on admission)  Assessment & Plan  T. Bili elevated at 10.  50% of the elevation is indirect bilirubin and 50% is direct bilirubin indicating intrahepatic process.  Likely secondary to alcoholic vs viral hepatitis and/or cirrhosis.  Will continue to monitor.  MRCP does not reveal any obstruction.    Thrombocytopenia (CMS-HCC) (present on admission)  Assessment & Plan  Platelets went down again today from 52 to 30  Likely caused by Heparin-Induced Thrombocytopenia as HIT anti-bodies are positive, will wait for Serotonin Assay to confirm  Have held all heparin and talked to nephrology to not use heparin during dialysis  Will continue to monitor    Hepatitis C antibody test positive (present on admission)  Assessment & Plan  Likely has had exposure in the past.  HCR RNA PCR indicates viral load.  May consider treatment down the line as an outpatient.    Altered mental status  Assessment & Plan  Likely multifactorial.  Combination of alcoholic cirrhosis vs renal dysfunction vs sundowning.  Will treat underlying cause.    Alcoholic hepatitis (present on admission)  Assessment & Plan  Patient's liver enzymes elevated but  patient's last drink was a month ago.  Could be having alcoholic hepatitis since a month.  INR is elevated and T. Bili is at 10.  Maddrey's discriminant function is 60.6 which indicates steroid use to reduce mortality but it is unclear how much bilirubin is from the hepatitis vs biliary obstruction.  In any case starting steroids is contraindicated as patient is septic.  On pentoxifylline at renal dosing.  Also on rifaximin and lactulose. Ammonia has been stable since admission.  Gastroenterology following, appreciate recommendations.    Excoriation of buttock (present on admission)  Assessment & Plan  Etiology from being bed bound.  Is erythematous and oozing.  Received one dose of vancomycin and then switched over to doxycycline for a day.  Doesn't appear to need antibiotics as it does not look infected.  Wound care consult in place.

## 2017-07-02 NOTE — PROGRESS NOTES
Pharmacy Kinetics 82 y.o. female on vancomycin day # 1 7/2/2017    Currently Dose: Vancomycin 1900 mg iv once (~25 mg/kg)  Received Load Dose: Yes (pending administration)    Indication for Treatment: intra-abdominal infection (persistent SBP)  ID Service Following: No    Pertinent history per medical record: Admitted on 6/21/2017 for cholangitis with concurrent renal failure. Nephrology consulted with plans for continued iHD. GI consulting for melena/cirrhosis.    Other antibiotics: piperacillin/tazobactam 2.25 gm iv q12h    Allergies: Heparin     List concerns for accumulation of vancomycin: renal dysfunction (w/iHD), electrolyte derangement, abnormal LFT's, cirrhosis, BMI ~29, low albumin/malnutrition    Pertinent cultures to date:   6/30/17 BCx x2 (negative)  6/27/17 paracentesis fluid Cx x1 (negative)  6/24/17 BCx x2 (negative)  6/21/17 UCx x1 (negative)  6/21/17 buttock WCx x1 (negative)  6/21/17 BCx x2 (Escherichia coli)    6/21/17 C/S Report  ESCHERICHIA COLI      Antibiotic Sensitivity Microscan Unit Status     Ampicillin Sensitive <=8 mcg/mL Final     Cefepime Sensitive <=8 mcg/mL Final     Cefotaxime Sensitive <=2 mcg/mL Final     Cefotetan Sensitive <=16 mcg/mL Final     Ceftazidime Sensitive <=1 mcg/mL Final     Ceftriaxone Sensitive <=8 mcg/mL Final     Cefuroxime Sensitive 8 mcg/mL Final     Ciprofloxacin Sensitive <=1 mcg/mL Final     Ertapenem Sensitive <=1 mcg/mL Final     Gentamicin Sensitive <=4 mcg/mL Final     Pip/Tazobactam Sensitive <=16 mcg/mL Final     Tigecycline Sensitive <=2 mcg/mL Final     Tobramycin Sensitive <=4 mcg/mL Final     Trimeth/Sulfa Sensitive <=2/38 mcg/mL Final        Recent Labs      06/30/17 0214 07/01/17 0157 07/02/17   0326   WBC  16.0*  18.8*  15.6*   NEUTSPOLYS   --   82.60*  93.80*   BANDSSTABS   --   6.10  0.90     Recent Labs      06/30/17   0214 07/01/17 0157 07/02/17   0326   BUN  29*  38*  32*   CREATININE  2.95*  3.11*  2.57*   ALBUMIN  2.0*  1.8*   "1.9*     Intake/Output Summary (Last 24 hours) at 17 1217  Last data filed at 17 0800   Gross per 24 hour   Intake   1050 ml   Output   6900 ml   Net  -5850 ml      Blood pressure 110/39, pulse 77, temperature 37 °C (98.6 °F), resp. rate 19, height 1.6 m (5' 3\"), weight 75.8 kg (167 lb 1.7 oz), SpO2 91 %, not currently breastfeeding. Temp (24hrs), Av.2 °C (97.2 °F), Min:35.3 °C (95.5 °F), Max:37 °C (98.6 °F)    Estimated Creatinine Clearance: 17 mL/min (by C-G formula based on Cr of 2.57).    A/P   1. Vancomycin dose change: not indicated   2. Next vancomycin level: 7/3/17 @0300  3. Goal trough: 12-16 mcg/mL  4. Comments: Hypotension noted. Afebrile. WBC elevated/down. CrCl ~17 mL/min with plans by nephrology to continue with tentative 3x/week iHD. No new microbiology aside from prior pan-sensitive Escherichia coli. Recent belly tap . Multiple concerns for accumulation of vancomycin identified. Given concerns will draw level with AM labs 7/3 prior to next planned iHD session to determine if additional dosing required. Pharmacy will follow and continue to adjust as appropriate.    Kevin Nathan, PHARMD            "

## 2017-07-02 NOTE — PROGRESS NOTES
Tech from Lab called with critical result of PLTs of 30 at 0500 am. Critical lab result read back to .    Dr. Kevin notified of critical lab result at 510 am.  Critical lab result read back by Dr. Kevin. Instructed to assess pt for any signs of bleeding.

## 2017-07-02 NOTE — PROGRESS NOTES
Received report from  ZAK Schultz. Patient is Alert & Oriented x1 with no complaints of pain or shortness of breath. Hourly rounding and Q2 turns in place. Pt is resting comfortably in bed. Patient is in soft wrist restraints. Treaded socks on pt. Bed is in the lowest position and locked. Bedside table is close.

## 2017-07-02 NOTE — PROGRESS NOTES
Gastroenterology Progress Note     Author: Wesley Medinaterman   Date & Time Created: 7/2/2017 8:33 AM    Interval History:  GI Consults re: melena, cirrhosis    6/29/17: Remains confused.  RN states only green stools and no melena.  Hgb stable.  Getting dialysis.  WBC slightly up.  Denies abdominal pain.   6/30/17: Confusion improving but still slow and fatigued.  Spoke with  who states she has had general decline over several weeks and did have some possible mild confusion and dementia prior to arrival.  Also with poor PO intake at home.  She denies abdominal pain.  Notes some mild cough.  No melena or hematochezia.  WBC rising.  Hgb stable.  Liver tests unchanged.  7/1: No new issues.  WBC rising.  No fevers.  Poor PO intake.  Some loose stools but on lactulose.  7/2: Denies pain.  No fevers.  WBC slightly better.  Tolerating Boost 2-3 times daily and some minimal diet intake otherwise.  Paracentesis performed and does indicate ongoing SBP given PMNs >250 despite Unasyn    Review of Systems:  Review of Systems   Unable to perform ROS: mental status change       Physical Exam:  Physical Exam   Constitutional: She appears well-developed.   Cardiovascular: Normal rate and regular rhythm.    Murmur heard.  Pulmonary/Chest: Effort normal and breath sounds normal. She has no wheezes. She has no rales.   Abdominal: Soft. Bowel sounds are normal. She exhibits distension. There is no tenderness.   Musculoskeletal: She exhibits edema.   Neurological: She is alert.   Skin: Skin is dry.   Nursing note and vitals reviewed.      Labs:        Invalid input(s): [id number]      Recent Labs      06/30/17   0214  07/01/17 0157 07/02/17   0326   SODIUM  133*  133*  135   POTASSIUM  3.8  3.9  3.7   CHLORIDE  101  103  105   CO2  27  22  24   BUN  29*  38*  32*   CREATININE  2.95*  3.11*  2.57*   MAGNESIUM   --   1.9   --    CALCIUM  7.7*  8.0*  8.1*     Recent Labs      06/30/17   0214  07/01/17   0157 07/02/17   0326    ALTSGPT  93*  88*  78*   ASTSGOT  85*  78*  70*   ALKPHOSPHAT  112*  116*  117*   TBILIRUBIN  8.5*  8.0*  7.9*   GLUCOSE  93  92  96     Recent Labs      17   0214  17   0157  17   0326   RBC  2.70*  2.78*  2.67*   HEMOGLOBIN  9.1*  9.3*  8.9*   HEMATOCRIT  28.6*  29.4*  29.0*   PLATELETCT  64*  52*  30*     Recent Labs      17   0214  17   0157  17   0326   WBC  16.0*  18.8*  15.6*   NEUTSPOLYS   --   82.60*  93.80*   LYMPHOCYTES   --   4.30*  0.90*   MONOCYTES   --   7.00  3.50   EOSINOPHILS   --   0.00  0.90   BASOPHILS   --   0.00  0.00   ASTSGOT  85*  78*  70*   ALTSGPT  93*  88*  78*   ALKPHOSPHAT  112*  116*  117*   TBILIRUBIN  8.5*  8.0*  7.9*     Hemodynamics:  Temp (24hrs), Av.1 °C (97 °F), Min:35.3 °C (95.5 °F), Max:36.6 °C (97.8 °F)  Temperature: 36.1 °C (97 °F)  Pulse  Av.2  Min: 63  Max: 98   Blood Pressure : 135/57 mmHg     Respiratory:    Respiration: 18, Pulse Oximetry: 94 %        RUL Breath Sounds: Diminished, RML Breath Sounds: Diminished, RLL Breath Sounds: Diminished, SARAH Breath Sounds: Diminished, LLL Breath Sounds: Diminished  Fluids:    Intake/Output Summary (Last 24 hours) at 17 1052  Last data filed at 17 0600   Gross per 24 hour   Intake    320 ml   Output     30 ml   Net    290 ml     Weight: 75.8 kg (167 lb 1.7 oz)  GI/Nutrition:  Orders Placed This Encounter   Procedures   • DIET ORDER     Standing Status: Standing      Number of Occurrences: 1      Standing Expiration Date:      Order Specific Question:  Diet:     Answer:  2 Gram Sodium [7]      Comments:  Strict 1:1 feeding; crush meds in applesauce/pudding, please encourage PO intake     Order Specific Question:  Texture/Fiber modifications:     Answer:  Dysphagia 1(Pureed)specify fluid consistency(question 6) [1]     Order Specific Question:  Consistency/Fluid modifications:     Answer:  Thin Liquids [3]     Order Specific Question:  Miscellaneous modifications:     Answer:   SLP - 1:1 Supervision by Nursing [21]     Medical Decision Making, by Problem:  Active Hospital Problems    Diagnosis   • Upper GI bleed [K92.2]   • Bacteremia due to Escherichia coli [R78.81]   • Sepsis (CMS-HCC) [A41.9]   • Alcoholic cirrhosis of liver with ascites (CMS-HCC) [K70.31]   • Chronic hepatitis C virus infection (CMS-HCC) [B18.2]   • Acute kidney injury (CMS-HCC) [N17.9]   • Urinary tract infection [N39.0]   • Hyperbilirubinemia [E80.6]   • Thrombocytopenia (CMS-HCC) [D69.6]   • Elevated troponin [R74.8]   • Abdominal pain [R10.9]   • Alcoholic hepatitis [K70.10]   • Excoriation of buttock [S30.810A]     Impression:  1.  Melena x 1 episode although no recurrence and nursing notes blood from perianal wounds only  2.  Decompensated cirrhosis:  Alcohol and chronic hepatitis C.  PRASANNA neg.  Iron studies in am  3.  Abd pain.  No SBP on initial tap but already on Unasyn.  Pain resolved.  Has ongoing evidence for SBP despite Unasyn based on repeat tap 7/1  3.  Ascites, large, as above  4.  BENNETT, likely contrast induced nephropathy or ATN, for HD today  5.  Confusion with normal ammonia.  Unclear baseline and possible dementia?  Started Xifaxan after lactulose was stopped yesterday.  Improving slowly   6.  Anemia, acute on chronic blood loss. Stable.  7.  Coagulopathy  8.  Thrombocytopenia  9. Protein calorie malnutrition, moderate to severe  10. Leukocytosis. Suspect due to persistent SBP despite Unasyn     Plan:  1. Recommend broadening antibiotic coverage to Vanco and Zosyn given ongoing SBP despite Unasyn.  Await culture to identify resistant organism from ascites and possibly tailor antibiotic coverage.  Give albumin 1.5 grams/kg today and 1 gram/kg albumin on day 3 (7/4) in addition to antibiotics for treatment of SBP  2. Continue lactulose and Xifaxan.  3. Have concerns regarding nutrition which is minimal.  Discussed with  that nutrition is main beneficial therapy for her underlying liver disease.   He wants to try and work with her to improve PO intake.  If does not improve, would recommend placement of Coretrack for enteral nutrition  4. Continue pentoxifylline  5. Continue PPI  6. Still no need for urgent EGD but consider once clinical status improves prior to discharge to assess for varices  7  Did discuss with  and her poor prognosis overall given severity of underlying liver disease and age and now renal issues.    Medications reviewed, Labs reviewed and Radiology images reviewed

## 2017-07-03 PROBLEM — K92.2 UPPER GI BLEED: Status: RESOLVED | Noted: 2017-01-01 | Resolved: 2017-01-01

## 2017-07-03 NOTE — PROGRESS NOTES
[facility] Internal Medicine Interval Note    Name Abigail Bradley     1935   Age/Sex 82 y.o. female   MRN [id number]   Code Status DNR     After 5PM or if no immediate response to page, please call for cross-coverage  Attending/Team: Dr. Crain/Melchor Use Tiger Text to page  6AM-5PM  Call [phone] to page afterhours   1st Call - Day Intern (R1):   Dr. Barrientos 2nd Call - Day Sr. Resident (R2/R3):   Dr. Bennett         Chief complaint/ reason for interval visit (Primary Diagnosis)   Abdominal pain, UTI, Sepsis    Interval Problem Daily Status Update    Patient was seen and examined at bedside. There were no overnight events. The staff at night did note that she seemed more clear last night than previous ones. She was able to recognize members of the health care team today. However it should be noted that this morning it was very hard to arouse her and difficult to get a response. She had a total of about 300mL urine output over the last 24 hours.      Review of Systems   Constitutional: Positive for malaise/fatigue. Negative for fever, chills and weight loss.   HENT: Negative for ear discharge, ear pain, hearing loss and sore throat.    Eyes: Negative for blurred vision, double vision and photophobia.   Respiratory: Negative for cough, hemoptysis, shortness of breath, wheezing and stridor.    Cardiovascular: Negative for chest pain, palpitations and claudication.   Gastrointestinal: Negative for heartburn, nausea, vomiting and blood in stool.   Genitourinary: Negative for dysuria, urgency, frequency and flank pain.   Musculoskeletal: Negative for myalgias, joint pain, falls and neck pain.   Skin: Negative for itching and rash.   Neurological: Positive for weakness. Negative for dizziness, tingling, sensory change, focal weakness, seizures and headaches.   Psychiatric/Behavioral: Negative for depression, suicidal ideas and hallucinations. The patient does not have insomnia.         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 Weight: 76.2 kg (167 lb 15.9 oz)  Oxygen Therapy:  Pulse Oximetry: 92 %, O2 (LPM): 0, O2 Delivery: None (Room Air)    Physical Exam   HENT:   Head: Normocephalic and atraumatic.   Mouth/Throat: Oropharynx is clear and moist.   Eyes: Conjunctivae are normal. Pupils are equal, round, and reactive to light. Right eye exhibits no discharge. Left eye exhibits no discharge.   Neck: Normal range of motion. No JVD present. No tracheal deviation present. No thyromegaly present.   Cardiovascular: Normal rate and regular rhythm.    Pulmonary/Chest: Effort normal and breath sounds normal. No respiratory distress. She has no wheezes.   Abdominal: She exhibits distension. There is no tenderness. There is no rebound and no guarding.   Musculoskeletal: Normal range of motion. She exhibits edema. She exhibits no tenderness.   Neurological:   Patient is alert at times but has periods of responsiveness    Skin: No rash noted. No erythema.   Psychiatric: Mood and affect normal.         Lab Data Review:      7/3/2017  2:32 PM    Recent Labs      07/01/17   0157  07/02/17   0326  07/03/17   0313   SODIUM  133*  135  137   POTASSIUM  3.9  3.7  3.5*   CHLORIDE  103  105  104   CO2  22  24  24   BUN  38*  32*  39*   CREATININE  3.11*  2.57*  2.34*   MAGNESIUM  1.9   --    --    CALCIUM  8.0*  8.1*  8.5        Recent Labs      07/01/17 0157 07/02/17 0326  07/03/17   0313   ALTSGPT  88*  78*  60*   ASTSGOT  78*  70*  55*   ALKPHOSPHAT  116*  117*  111*   TBILIRUBIN  8.0*  7.9*  7.6*   GLUCOSE  92  96  91       Recent Labs      07/02/17 0326  07/02/17   1413  07/03/17   0313   RBC  2.67*  2.30*  2.66*   HEMOGLOBIN  8.9*  7.8*  8.8*   HEMATOCRIT  29.0*  25.2*  28.3*   PLATELETCT  30*  26*  24*       Recent Labs      07/01/17 0157 07/02/17 0326  07/02/17   1413  07/03/17   0313   WBC  18.8*  15.6*  13.8*  12.8*   NEUTSPOLYS  82.60*  93.80*   --   93.00*   LYMPHOCYTES  4.30*  0.90*   --   2.60*   MONOCYTES  7.00  3.50   --   3.50   EOSINOPHILS  0.00  0.90   --   0.00   BASOPHILS  0.00  0.00   --   0.00   ASTSGOT  78*  70*   --   55*   ALTSGPT  88*  78*   --   60*   ALKPHOSPHAT  116*  117*   --   111*   TBILIRUBIN  8.0*  7.9*   --   7.6*           Assessment/Plan     Alcoholic cirrhosis of liver with ascites (CMS-HCC) (present on admission)  Assessment & Plan  Likely alcohol related from chronic alcohol abuse vs hepatitis C.  Cirrhosis was identified on ultrasound.  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  Underwent a paracentesis with IR on 7/1 which drained 4L, specimen sent for culture  Fluid showed 500 WBC with 86% neutrophils  Current lab data for culture shows no growth after 48 hours    Sepsis (CMS-HCC) (present on admission)  Assessment & Plan  Source of infection is likely UTI.  Blood cultures identified E.coloi in 2/2 bottles on 06/21  WBC decreased today from 18.8 to 15.6  Results from ascites fluid culture pending, Gram stain only shows WBCs, no organisms  C diff PCR toxin taken on 7/1 are negative  Blood cultures taken on 06/30 were negative   Continue with IV maintenance fluids  Continue Zosyn and Vancomycin      Acute kidney injury (CMS-HCC) (present on admission)  Assessment & Plan  Most likely etiology is contrast induced nephropathy. However ATN  or hepatorenal syndrome could be another possibility.   FeNa is 0.11% showing pre-renal.   Urine eosonophils are negative.  Will repeat urine electrolytes.  Nephrologist Dr. Bermudez on board, appreciate recommendations.  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  Changed from unasyn to zosyn for coverage  Urine cultures discarded as they were non-specific    Hyperbilirubinemia (present on admission)  Assessment & Plan  T. Bili elevated at 10.  50% of the elevation is indirect bilirubin and 50% is direct bilirubin indicating intrahepatic process.  Likely secondary to alcoholic vs viral hepatitis and/or cirrhosis.  Will continue to monitor.  MRCP does not reveal any obstruction.    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  HCR RNA PCR indicates viral load.  May consider treatment down the line as an outpatient.    Altered mental status  Assessment & Plan  Likely multifactorial.  Combination of alcoholic cirrhosis vs renal dysfunction vs sundowning.  Will treat underlying cause.    Alcoholic hepatitis (present on admission)  Assessment & Plan  Patient's liver enzymes elevated but patient's last drink was a month ago.  Could be having alcoholic hepatitis since a month.  INR is elevated and T. Bili is at 10.  Maddrey's discriminant function is 60.6 which  indicates steroid use to reduce mortality but it is unclear how much bilirubin is from the hepatitis vs biliary obstruction.  In any case starting steroids is contraindicated as patient is septic.  On pentoxifylline at renal dosing.  Also on rifaximin and lactulose. Ammonia has been stable since admission.  Gastroenterology following, appreciate recommendations.    Excoriation of buttock (present on admission)  Assessment & Plan  Etiology from being bed bound  Doesn't appear to need antibiotics as it does not look infected  Wound care consult in place

## 2017-07-03 NOTE — PROGRESS NOTES
"Pharmacy Kinetics 82 y.o. female on vancomycin day # 2 7/3/2017    Currently Dose: Vancomycin 1900 mg iv once (~25 mg/kg)  Received Load Dose: Yes    Indication for Treatment: intra-abdominal infection (persistent SBP)  ID Service Following: No    Pertinent history per medical record: Admitted on 2017 for cholangitis with concurrent renal failure. Nephrology consulted with plans for continued iHD. GI consulting for melena/cirrhosis.    Other antibiotics: piperacillin/tazobactam 2.25 gm iv q12h    Allergies: Heparin     List concerns for accumulation of vancomycin: renal dysfunction (w/iHD), electrolyte derangement, abnormal LFT's, cirrhosis, BMI ~29, low albumin/malnutrition    Pertinent cultures to date:    17 paracentesis fluid WCx x2 (negative to date)  17 BCx x2 (negative)  17 paracentesis fluid WCx x1 (negative)  17 BCx x2 (negative)  17 UCx x1 (negative)  17 buttock WCx x1 (negative)  17 BCx x2 (Escherichia coli)    Recent Labs      17   0157  17   0326  17   1413  17   0313   WBC  18.8*  15.6*  13.8*  12.8*   NEUTSPOLYS  82.60*  93.80*   --   93.00*   BANDSSTABS  6.10  0.90   --   0.90     Recent Labs      17   0157  17   0326  17   0313   BUN  38*  32*  39*   CREATININE  3.11*  2.57*  2.34*   ALBUMIN  1.8*  1.9*  2.6*     Recent Labs      17   0313   VANCORANDOM  20.3     Intake/Output Summary (Last 24 hours) at 17 1114  Last data filed at 17 0500   Gross per 24 hour   Intake    750 ml   Output    325 ml   Net    425 ml      Blood pressure 151/77, pulse 63, temperature 36.1 °C (96.9 °F), resp. rate 20, height 1.6 m (5' 3\"), weight 76.2 kg (167 lb 15.9 oz), SpO2 95 %, not currently breastfeeding. Temp (24hrs), Av.3 °C (97.3 °F), Min:36.1 °C (96.9 °F), Max:37 °C (98.6 °F)    Estimated Creatinine Clearance: 18.7 mL/min (by C-G formula based on Cr of 2.34).    A/P   1. Vancomycin dose change: not indicated "   2. Next vancomycin level: 7/4/17 @0300  3. Goal trough: 12-16 mcg/mL  4. Comments: VS stable. Afebrile. WBC elevated/stable. CrCl ~19 mL/min with plans by nephrology to schedule TTS per discussion. Recent paracentesis fluid pending C/S. Given next iHD session 7/4 will repeat trough with AM labs and repeat dosing as appropriate. Continued concerns for accumulation of vancomycin, most recent trough at upper end of goal post-load dose. Pharmacy will follow and continue to adjust as appropriate.    Kevin Nathan, PHARMD

## 2017-07-03 NOTE — PROGRESS NOTES
GI Progress Note:    Pierre Palmer  Date & Time note created:    7/3/2017   12:21 PM       Patient ID:   Name:             Abigail Bradley   YOB: 1935  Age:                 82 y.o.  female   MRN:               [id number]                                                               Subjective:   No further melena, feels a bit better since paracentesis.        Past Medical History:   Unchanged    Past Surgical History:  Unchanged    Hospital Medications:    Current facility-administered medications:   •  [START ON 7/4/2017] piperacillin-tazobactam (ZOSYN) 0.75 g in  mL IVPB, 0.75 g, Intravenous, TUE+THU+SAT, Sondra Bennett M.D.  •  HEPARIN INDUCED PLATELET AB(HIT), , , Once **AND** Pharmacy Consult: HIT Monitoring, , Other, PRN, Sondra Bennett M.D.  •  piperacillin-tazobactam (ZOSYN) 2.25 g in  mL IVPB, 2.25 g, Intravenous, Q12HRS, Sondra Bennett M.D., Stopped at 07/03/17 1038  •  [START ON 7/4/2017] albumin human 25% solution 75 g, 75 g, Intravenous, Once, Sondra Bennett M.D.  •  anticoagulant cit dextrose soln A (ACD) 10 mL in bottle/bag empty 10 mL vial, , Intracatheter, DIALYSIS PRN, Deisy Bone M.D.  •  MD ALERT... vancomycin per pharmacy protocol, , Other, pharmacy to dose, Sondra Bennett M.D.  •  Action is required: Protocol 1073 Hypoglycemia has been implemented, , , Once **AND** Protocol 1073 Inclusion Criteria, , , CONTINUOUS **AND** Protocol 1073 NOTIFY, , , Once **AND** Protocol 1073 Initiate protocol immediately if FSBG is less than or equal to 70 mg/dL, , , CONTINUOUS **AND** glucose 4 g chewable tablet 16 g, 16 g, Oral, Q15 MIN PRN **AND** dextrose 50% (D50W) injection 25 mL, 25 mL, Intravenous, Q15 MIN PRN, Ivana Guthrie M.D.  •  omeprazole (PRILOSEC) capsule 20 mg, 20 mg, Oral, BID, Wesley Duggan M.D., 20 mg at 07/03/17 1008  •  NS (BOLUS) infusion 250 mL, 250 mL, Intravenous, DIALYSIS PRN, Alfonso Bermudez M.D.  •  albumin human 25% solution 12.5 g, 12.5 g,  "Intravenous, DIALYSIS PRN, Alfonso Bermudez M.D., Stopped at 07/02/17 0954  •  lactulose 20 GM/30ML solution 15 mL, 15 mL, Oral, BID, Greg Diaz M.D., 15 mL at 07/03/17 1008  •  rifaximin (XIFAXAN) tablet 550 mg, 550 mg, Oral, BID, Piper Grant M.D., 550 mg at 07/03/17 1008  •  pentoxifylline CR (TRENTAL) tablet 400 mg, 400 mg, Oral, DAILY, Greg Diaz M.D., 400 mg at 07/03/17 1008    Medication Allergy:  Allergies   Allergen Reactions   • Heparin      7/2/17 HIT Ab (positive); 7/2/17 JARETT (in process)         Physical Exam:  Vitals/ General Appearance:   Weight/BMI: Body mass index is 29.77 kg/(m^2).  Blood pressure 143/69, pulse 73, temperature 36.1 °C (97 °F), resp. rate 18, height 1.6 m (5' 3\"), weight 76.2 kg (167 lb 15.9 oz), SpO2 92 %, not currently breastfeeding.  Filed Vitals:    07/03/17 0010 07/03/17 0408 07/03/17 0737 07/03/17 1106   BP: 155/73 158/78 151/77 143/69   Pulse: 99 85 63 73   Temp: 36.2 °C (97.2 °F) 36.2 °C (97.1 °F) 36.1 °C (96.9 °F) 36.1 °C (97 °F)   Resp: 18 18 20 18   Height:       Weight:       SpO2: 95% 93% 95% 92%       Heart: RRR  Lungs: Clear  Abdomen: distended, nontender      Lab Data Review:  Recent Labs      07/02/17   0326  07/02/17   1413  07/03/17   0313   WBC  15.6*  13.8*  12.8*   RBC  2.67*  2.30*  2.66*   HEMOGLOBIN  8.9*  7.8*  8.8*   HEMATOCRIT  29.0*  25.2*  28.3*   MCV  108.6*  109.6*  106.4*   MCH  33.3*  33.9*  33.1*   MCHC  30.7*  31.0*  31.1*   RDW  77.8*  75.9*  74.0*   PLATELETCT  30*  26*  24*   MPV  11.6  11.5  12.8     Recent Labs      07/01/17 0157 07/02/17 0326 07/03/17 0313   SODIUM  133*  135  137   POTASSIUM  3.9  3.7  3.5*   CHLORIDE  103  105  104   CO2  22  24  24   GLUCOSE  92  96  91   BUN  38*  32*  39*   CREATININE  3.11*  2.57*  2.34*   CALCIUM  8.0*  8.1*  8.5                 Recent Labs      07/01/17 0157 07/02/17 0326 07/03/17 0313   SODIUM  133*  135  137   POTASSIUM  3.9  3.7  3.5*   CHLORIDE  103  105  104   CO2  22 "  24  24   GLUCOSE  92  96  91   BUN  38*  32*  39*     Recent Labs      07/01/17   0157  07/02/17   0326  07/03/17   0313   SODIUM  133*  135  137   POTASSIUM  3.9  3.7  3.5*   CHLORIDE  103  105  104   CO2  22  24  24   BUN  38*  32*  39*   CREATININE  3.11*  2.57*  2.34*   MAGNESIUM  1.9   --    --    CALCIUM  8.0*  8.1*  8.5              Imaging/Procedures Review:        Assessment and plan:  Patient with SBP with PMN count on ascitic fluid greater than 250 and antibiotic coverage broadened and culture pending. 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 WBC count is trending downward since change in antibiotics. Continue current therapy.

## 2017-07-03 NOTE — PROGRESS NOTES
Nephrology Progress Note, Adult, Complex               Author: Deisy Keitaadonaymichelle  Date & Time created: 7/3/2017  1:42 PM     Interval History:  82 year old with Hep C, cirrhosis, with BENNETT  -HENRY most likely.  Very somnolent today      Review of Systems:  Review of Systems   Unable to perform ROS: medical condition       Physical Exam:  Physical Exam   Constitutional: She appears well-developed and well-nourished. She appears ill. No distress.   HENT:   Head: Normocephalic and atraumatic.   Mouth/Throat: Oropharynx is clear and moist.   Eyes: Conjunctivae and EOM are normal. Pupils are equal, round, and reactive to light.   Neck: Normal range of motion. Neck supple.   Cardiovascular: Normal rate and regular rhythm.    Pulmonary/Chest: Effort normal and breath sounds normal. No respiratory distress. She has no wheezes. She has no rales.   Abdominal: Soft. She exhibits distension. There is no tenderness.   Musculoskeletal: She exhibits edema. She exhibits no tenderness.   Neurological:   Very somnolent   Skin: Skin is warm and dry.       Labs:        Invalid input(s): [id number]      Recent Labs      07/01/17 0157 07/02/17 0326 07/03/17   0313   SODIUM  133*  135  137   POTASSIUM  3.9  3.7  3.5*   CHLORIDE  103  105  104   CO2  22  24  24   BUN  38*  32*  39*   CREATININE  3.11*  2.57*  2.34*   MAGNESIUM  1.9   --    --    CALCIUM  8.0*  8.1*  8.5     Recent Labs      07/01/17   0157 07/02/17   0326  07/03/17   0313   ALTSGPT  88*  78*  60*   ASTSGOT  78*  70*  55*   ALKPHOSPHAT  116*  117*  111*   TBILIRUBIN  8.0*  7.9*  7.6*   GLUCOSE  92  96  91     Recent Labs      07/02/17   0326  07/02/17   1413  07/03/17   0313   RBC  2.67*  2.30*  2.66*   HEMOGLOBIN  8.9*  7.8*  8.8*   HEMATOCRIT  29.0*  25.2*  28.3*   PLATELETCT  30*  26*  24*     Recent Labs      07/01/17   0157  07/02/17   0326  07/02/17   1413  07/03/17   0313   WBC  18.8*  15.6*  13.8*  12.8*   NEUTSPOLYS  82.60*  93.80*   --   93.00*   LYMPHOCYTES   4.30*  0.90*   --   2.60*   MONOCYTES  7.00  3.50   --   3.50   EOSINOPHILS  0.00  0.90   --   0.00   BASOPHILS  0.00  0.00   --   0.00   ASTSGOT  78*  70*   --   55*   ALTSGPT  88*  78*   --   60*   ALKPHOSPHAT  116*  117*   --   111*   TBILIRUBIN  8.0*  7.9*   --   7.6*           Hemodynamics:  Temp (24hrs), Av.2 °C (97.1 °F), Min:36.1 °C (96.9 °F), Max:36.2 °C (97.2 °F)  Temperature: 36.1 °C (97 °F)  Pulse  Av.3  Min: 63  Max: 99   Blood Pressure : 143/69 mmHg     Respiratory:    Respiration: 18, Pulse Oximetry: 92 %        RUL Breath Sounds: Diminished, RML Breath Sounds: Diminished, RLL Breath Sounds: Diminished, SARAH Breath Sounds: Diminished, LLL Breath Sounds: Diminished  Fluids:    Intake/Output Summary (Last 24 hours) at 17 1342  Last data filed at 17 0500   Gross per 24 hour   Intake    350 ml   Output    325 ml   Net     25 ml     Weight: 76.2 kg (167 lb 15.9 oz)  GI/Nutrition:  Orders Placed This Encounter   Procedures   • DIET ORDER     Standing Status: Standing      Number of Occurrences: 1      Standing Expiration Date:      Order Specific Question:  Diet:     Answer:  2 Gram Sodium [7]      Comments:  Strict 1:1 feeding; crush meds in applesauce/pudding, please encourage PO intake     Order Specific Question:  Texture/Fiber modifications:     Answer:  Dysphagia 1(Pureed)specify fluid consistency(question 6) [1]     Order Specific Question:  Consistency/Fluid modifications:     Answer:  Thin Liquids [3]     Order Specific Question:  Miscellaneous modifications:     Answer:  SLP - 1:1 Supervision by Nursing [21]     Medical Decision Making, by Problem:  Active Hospital Problems    Diagnosis   • Upper GI bleed [K92.2]   • Bacteremia due to Escherichia coli [R78.81]   • Sepsis (CMS-HCC) [A41.9]   • Alcoholic cirrhosis of liver with ascites (CMS-HCC) [K70.31]   • Chronic hepatitis C virus infection (CMS-HCC) [B18.2]   • Acute kidney injury (CMS-HCC) [N17.9]   • Urinary tract infection  [N39.0]   • Hyperbilirubinemia [E80.6]   • Thrombocytopenia (CMS-HCC) [D69.6]   • Elevated troponin [R74.8]   • Abdominal pain [R10.9]   • Alcoholic hepatitis [K70.10]   • Excoriation of buttock [S30.810A]     1. BENNETT/HD -poor UOP -continue dialysis on TTS schedule  2. Hyponatremia: normalized  3. Acidosis: corrected  4. Anemia, Hgb  better  Recs;  -HD  - TTS  -monitor BMP  -diet renal  -all meds to renal doses  -Will follow   Labs reviewed and Medications reviewed

## 2017-07-04 NOTE — PROGRESS NOTES
Nasal trumpet not working to collect stool. Leaking around largest diameter tube. Wound care called. Will place BMS system.

## 2017-07-04 NOTE — PROGRESS NOTES
GI Progress Note:    Pierre Palmer  Date & Time note created:    7/4/2017   2:54 PM       Patient ID:   Name:             Abigail Bradley   YOB: 1935  Age:                 82 y.o.  female   MRN:               [id number]                                                               Subjective:   More alert today, feels tired        Past Medical History:   History reviewed. No pertinent past medical history.    Past Surgical History:  History reviewed. No pertinent past surgical history.    Hospital Medications:    Current facility-administered medications:   •  vancomycin 600 mg in  mL IVPB, 7.5 mg/kg, Intravenous, Once, Kevin Nathan, PHARMD  •  piperacillin-tazobactam (ZOSYN) 2.25 g in  mL IVPB, 2.25 g, Intravenous, Q12HRS, Sondra Bennett M.D., Stopped at 07/04/17 1257  •  piperacillin-tazobactam (ZOSYN) 0.75 g in  mL IVPB, 0.75 g, Intravenous, TUE+THU+SAT, Sondra Bennett M.D.  •  HEPARIN INDUCED PLATELET AB(HIT), , , Once **AND** Pharmacy Consult: HIT Monitoring, , Other, PRN, Sondra Bennett M.D.  •  anticoagulant cit dextrose soln A (ACD) 10 mL in bottle/bag empty 10 mL vial, , Intracatheter, DIALYSIS PRN, Deisy Bone M.D.  •  MD ALERT... vancomycin per pharmacy protocol, , Other, pharmacy to dose, Sondra Bennett M.D.  •  Action is required: Protocol 1073 Hypoglycemia has been implemented, , , Once **AND** Protocol 1073 Inclusion Criteria, , , CONTINUOUS **AND** Protocol 1073 NOTIFY, , , Once **AND** Protocol 1073 Initiate protocol immediately if FSBG is less than or equal to 70 mg/dL, , , CONTINUOUS **AND** glucose 4 g chewable tablet 16 g, 16 g, Oral, Q15 MIN PRN **AND** dextrose 50% (D50W) injection 25 mL, 25 mL, Intravenous, Q15 MIN PRN, Ivana Guthrie M.D.  •  omeprazole (PRILOSEC) capsule 20 mg, 20 mg, Oral, BID, Wesley Duggan M.D., 20 mg at 07/04/17 0719  •  NS (BOLUS) infusion 250 mL, 250 mL, Intravenous, DIALYSIS PRN, Alfonso Bermudez M.D.  •  albumin human  "25% solution 12.5 g, 12.5 g, Intravenous, DIALYSIS PRN, Alfonso Bermudez M.D., Stopped at 07/02/17 0954  •  lactulose 20 GM/30ML solution 15 mL, 15 mL, Oral, BID, Greg Diaz M.D., 15 mL at 07/04/17 0718  •  rifaximin (XIFAXAN) tablet 550 mg, 550 mg, Oral, BID, Piper Grant M.D., 550 mg at 07/04/17 0718  •  pentoxifylline CR (TRENTAL) tablet 400 mg, 400 mg, Oral, DAILY, Greg Diaz M.D., 400 mg at 07/04/17 0719    Medication Allergy:  Allergies   Allergen Reactions   • Heparin      7/2/17 HIT Ab (positive); 7/2/17 JARETT (in process)         Physical Exam:  Vitals/ General Appearance:   Weight/BMI: Body mass index is 29.77 kg/(m^2).  Blood pressure 135/67, pulse 84, temperature 36.3 °C (97.4 °F), resp. rate 22, height 1.6 m (5' 3\"), weight 76.2 kg (167 lb 15.9 oz), SpO2 94 %, not currently breastfeeding.  Filed Vitals:    07/04/17 0416 07/04/17 0753 07/04/17 1155 07/04/17 1232   BP: 138/63 138/65 142/67 135/67   Pulse: 77 77 74 84   Temp: 36.6 °C (97.8 °F) 36.1 °C (97 °F) 36.3 °C (97.3 °F) 36.3 °C (97.4 °F)   Resp: 15 20 17 22   Height:       Weight:       SpO2: 94% 93%  94%       Heart: RRR  Lungs: Clear  Abdomen: +BS, distended, nontender      Lab Data Review:  Recent Labs      07/02/17   1413  07/03/17   0313  07/04/17   0435   WBC  13.8*  12.8*  12.8*   RBC  2.30*  2.66*  2.67*   HEMOGLOBIN  7.8*  8.8*  8.8*   HEMATOCRIT  25.2*  28.3*  28.5*   MCV  109.6*  106.4*  106.7*   MCH  33.9*  33.1*  33.0   MCHC  31.0*  31.1*  30.9*   RDW  75.9*  74.0*  73.7*   PLATELETCT  26*  24*  23*   MPV  11.5  12.8  13.0*     Recent Labs      07/02/17   0326  07/03/17   0313  07/04/17 0435   SODIUM  135  137  137   POTASSIUM  3.7  3.5*  3.6   CHLORIDE  105  104  106   CO2  24  24  23   GLUCOSE  96  91  106*   BUN  32*  39*  52*   CREATININE  2.57*  2.34*  2.33*   CALCIUM  8.1*  8.5  8.3*         Recent Labs      07/04/17 0435   BNPBTYPENAT  310*     Recent Labs      07/04/17 0435   BNPBTYPENAT  310*     Recent Labs     "  07/02/17 0326 07/03/17 0313 07/04/17   0435   SODIUM  135  137  137   POTASSIUM  3.7  3.5*  3.6   CHLORIDE  105  104  106   CO2  24  24  23   GLUCOSE  96  91  106*   BUN  32*  39*  52*     Recent Labs      07/02/17 0326 07/03/17 0313 07/04/17 0435   SODIUM  135  137  137   POTASSIUM  3.7  3.5*  3.6   CHLORIDE  105  104  106   CO2  24  24  23   BUN  32*  39*  52*   CREATININE  2.57*  2.34*  2.33*   MAGNESIUM   --    --   1.8   PHOSPHORUS   --    --   4.6*   CALCIUM  8.1*  8.5  8.3*              Imaging/Procedures Review:        Assessment and plan:  Culture of ascitic fluid show no growth but definite evidence of SBP with elevated PMN count in ascitic fluid and seems a bit better since antibiotic coverage was broadened. Continuing on dialysis. Has high MELD score from Etoh/hep.C cirrhosis. Will continue current therapy.

## 2017-07-04 NOTE — PROGRESS NOTES
[facility] Progress Note    Hemodialysis treatment ordered today per Dr. Bone x 3 hours. Treatment initiated at 0853, ended at 1153.     Patient tolerated tx; see paper flow sheet for details.     Net UF 2000 mL.     Post tx, CVC flushed with saline then locked with heparin 1000 units/mL per designated amount in each wing then clamped and capped. Aspirate heparin prior to next CVC use.    Report given to Primary RN.

## 2017-07-04 NOTE — PROGRESS NOTES
[facility] [place] [facility] [place]    Name Abigail Bradley     1935   Age/Sex 82 y.o. female   MRN [id number]   Code Status DNR     After 5PM or if no immediate response to page, please call for cross-coverage  Attending/Team: Dr. Crain/Melchor Use Tiger Text to page  6AM-5PM  Call [phone] to page afterhours   1st Call - Day Intern (R1):   Dr. Barrientos 2nd Call - Day Sr. Resident (R2/R3):   Dr. Crain         Chief complaint/ reason for interval visit (Primary Diagnosis)   Abdominal Pain, UTI    Interval Problem Daily Status Update      Patient was seen and examined at bedside. Today she states that she feels very tired. She does not have any other complaints. Last night the NF team ws called about an elevated lactic acid level, 2.8. There were no change in vitals or mental status so no fluids were given and repeat lactic acid level was ordered and it decreased to 2.2 at midnight and now 1.8 in AM. Patient had 350mL urine output over last 24 hours. No signs of thrombosis or purpura.       Review of Systems   Constitutional: Positive for malaise/fatigue. Negative for fever, chills and diaphoresis.   HENT: Negative for ear discharge, hearing loss, nosebleeds and tinnitus.    Eyes: Negative for blurred vision, double vision, pain and discharge.   Respiratory: Negative for cough, hemoptysis and stridor.    Cardiovascular: Negative for chest pain, palpitations, claudication and leg swelling.   Gastrointestinal: Positive for abdominal pain. Negative for heartburn, nausea, vomiting and blood in stool.   Genitourinary: Negative for dysuria, urgency, hematuria and flank pain.   Musculoskeletal: Negative for myalgias, joint pain and neck pain.   Skin: Negative for itching and rash.   Neurological: Positive for weakness. Negative for dizziness, tingling, tremors, focal weakness, seizures and headaches.   Psychiatric/Behavioral: Negative for depression, suicidal ideas and  hallucinations. The patient is not nervous/anxious.        Consultants/Specialty  GI  Nephrology    Disposition  Inpatient with guarded prognosis    Quality Measures  Medications reviewed and Labs reviewed    : No Central Line.      DVT prophylaxis - mechanical: SCDs    : On Zosyn and Vancomycin.            Physical Exam       Filed Vitals:    07/03/17 2016 07/04/17 0014 07/04/17 0416 07/04/17 0753   BP: 136/59 143/67 138/63 138/65   Pulse: 77 78 77 77   Temp: 35.9 °C (96.7 °F) 36.1 °C (97 °F) 36.6 °C (97.8 °F) 36.1 °C (97 °F)   Resp: 16 16 15 20   Height:       Weight:       SpO2: 99% 94% 94% 93%     Body mass index is 29.77 kg/(m^2).    Oxygen Therapy:  Pulse Oximetry: 93 %, O2 (LPM): 0, O2 Delivery: None (Room Air)    Physical Exam   Constitutional: She is well-developed, well-nourished, and in no distress.   HENT:   Head: Normocephalic and atraumatic.   Mouth/Throat: No oropharyngeal exudate.   Eyes: Conjunctivae and EOM are normal. Pupils are equal, round, and reactive to light.   Neck: Normal range of motion. Neck supple. No JVD present. No tracheal deviation present. No thyromegaly present.   Cardiovascular: Normal rate and regular rhythm.    Pulmonary/Chest: Effort normal and breath sounds normal. No respiratory distress. She has no wheezes. She exhibits no tenderness.   Abdominal: Soft. Bowel sounds are normal. She exhibits distension. There is no tenderness. There is no rebound and no guarding.   Musculoskeletal: Normal range of motion. She exhibits edema. She exhibits no tenderness.   Neurological: She is alert. No cranial nerve deficit. Coordination normal.   Skin: No rash noted. No erythema.   Slight bruising of lower right forearm   Psychiatric: Mood and affect normal.         Lab Data Review:      7/4/2017  8:08 AM    Recent Labs      07/02/17   0326  07/03/17   0313  07/04/17   0435   SODIUM  135  137  137   POTASSIUM  3.7  3.5*  3.6   CHLORIDE  105  104  106   CO2  24  24  23   BUN  32*  39*  52*    CREATININE  2.57*  2.34*  2.33*   MAGNESIUM   --    --   1.8   PHOSPHORUS   --    --   4.6*   CALCIUM  8.1*  8.5  8.3*       Recent Labs      07/02/17 0326 07/03/17 0313 07/04/17   0435   ALTSGPT  78*  60*  58*   ASTSGOT  70*  55*  55*   ALKPHOSPHAT  117*  111*  129*   TBILIRUBIN  7.9*  7.6*  6.9*   GLUCOSE  96  91  106*       Recent Labs      07/02/17 1413 07/03/17 0313 07/04/17   0435   RBC  2.30*  2.66*  2.67*   HEMOGLOBIN  7.8*  8.8*  8.8*   HEMATOCRIT  25.2*  28.3*  28.5*   PLATELETCT  26*  24*  23*       Recent Labs      07/02/17 0326 07/02/17 1413 07/03/17 0313 07/04/17   0435   WBC  15.6*  13.8*  12.8*  12.8*   NEUTSPOLYS  93.80*   --   93.00*  87.10*   LYMPHOCYTES  0.90*   --   2.60*  6.00*   MONOCYTES  3.50   --   3.50  6.00   EOSINOPHILS  0.90   --   0.00  0.90   BASOPHILS  0.00   --   0.00  0.00   ASTSGOT  70*   --   55*  55*   ALTSGPT  78*   --   60*  58*   ALKPHOSPHAT  117*   --   111*  129*   TBILIRUBIN  7.9*   --   7.6*  6.9*           Assessment/Plan     Alcoholic cirrhosis of liver with ascites (CMS-HCC) (present on admission)  Assessment & Plan  Likely alcohol related from chronic alcohol abuse vs hepatitis C.  Cirrhosis was identified on ultrasound.  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  Underwent a paracentesis with IR on 7/1 which drained 4L  Fluid showed 500 WBC with 86% neutrophils  Culture is negative    Sepsis (CMS-HCC) (present on admission)  Assessment & Plan  Source of infection is likely UTI.  Blood cultures identified E.coli in 2/2 bottles on 06/21  WBC decreased today, currently 12.8  Results from ascites fluid culture negative, morphology showed >250 PMNs  C diff PCR toxin taken on 7/1 are negative  Blood cultures taken on 06/30 were negative   Continue with IV maintenance fluids  Continue Zosyn and Vancomycin      Acute kidney injury (CMS-HCC) (present on admission)  Assessment & Plan  Most likely etiology is  contrast induced nephropathy. However ATN or hepatorenal syndrome could be another possibility.   FeNa is 0.11% showing pre-renal.   Urine eosonophils are negative.  Will repeat urine electrolytes.  Nephrologist Dr. Bermudez on board, appreciate recommendations.  Patient has had temp dialysis catheter placed  Undergoing hemodialysis per nephro recommendations, most recent was today  Spoke to Nephrology about not using heparin during dialysis due to suspected HIT    Urinary tract infection (present on admission)  Assessment & Plan  UA revealed the presence of a UTI.  Changed from unasyn to zosyn for coverage  Urine cultures discarded as they were non-specific    Hyperbilirubinemia (present on admission)  Assessment & Plan  T. Bili elevated at 10.  50% of the elevation is indirect bilirubin and 50% is direct bilirubin indicating intrahepatic process.  Likely secondary to alcoholic vs viral hepatitis and/or cirrhosis.  Will continue to monitor.  MRCP does not reveal any obstruction.    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  HCR RNA PCR indicates viral load.  May consider treatment down the line as an outpatient.    Altered mental status  Assessment & Plan  Likely multifactorial.  Combination of alcoholic cirrhosis vs renal dysfunction vs sundowning.  Will treat underlying cause.    Alcoholic hepatitis (present on admission)  Assessment & Plan  Patient's liver enzymes elevated but patient's last drink was a month ago.  Could be having alcoholic hepatitis since a month.  INR is elevated and T. Bili is at  10.  Maddrey's discriminant function is 60.6 which indicates steroid use to reduce mortality but it is unclear how much bilirubin is from the hepatitis vs biliary obstruction.  In any case starting steroids is contraindicated as patient is septic.  On pentoxifylline at renal dosing.  Also on rifaximin and lactulose. Ammonia has been stable since admission.  Gastroenterology following, appreciate recommendations.    Excoriation of buttock (present on admission)  Assessment & Plan  Etiology from being bed bound  Doesn't appear to need antibiotics as it does not look infected  Wound care consult in place

## 2017-07-04 NOTE — PROGRESS NOTES
Whitley from Lab called with critical result of platelets of 23 at 0519 . Critical lab result read back to Whitley.   Dr. Angie Case notified of critical lab result at 0529. Critical lab result read back by Dr Angie Case. No new orders received at this time.

## 2017-07-04 NOTE — PROGRESS NOTES
Notified [facility] Dr Angie Case of pt's lactic acid of 2.2, down from 2.8. New order received for recheck of lactic acid in 4 hours. VS currently stable, with no changes from last. Will continue to monitor pt closely for any changes.

## 2017-07-04 NOTE — WOUND TEAM
Wound team note:   Pt seen for placement of BMS. MD order verified. Pt assessed, noted to be incontinent of loose brown stool.  Bilateral buttocks wounds clean and wound care per orders.  Sphincter tone determined to be adequate. BMS placed without difficulty, 35 ml's of tap water placed in retention balloon, irrigated with 180 ml warm tap water. Nursing  to irrigate q shift with enough  warm tap water to maintain tube patency.

## 2017-07-04 NOTE — THERAPY
Attempted to see pt for OT tx. Transport just arriving to take pt to dialysis. Will try again later as able.

## 2017-07-04 NOTE — PROGRESS NOTES
"Pharmacy Kinetics 82 y.o. female on vancomycin day # 3 2017    Currently Dose: Vancomycin pulse dosing  *received 1900 mg iv x1   Received Load Dose: Yes    Indication for Treatment: intra-abdominal infection (persistent SBP)  ID Service Following: No    Pertinent history per medical record: Admitted on 2017 for cholangitis with concurrent renal failure. Nephrology consulted with plans for continued iHD. GI consulting for melena/cirrhosis.    Other antibiotics: piperacillin/tazobactam 2.25 gm iv q12h    Allergies: Heparin     List concerns for accumulation of vancomycin: renal dysfunction (w/iHD), electrolyte derangement, abnormal LFT's, cirrhosis, BMI ~29, low albumin/malnutrition    Pertinent cultures to date:    17 CDiff PCR (negative)  17 paracentesis fluid WCx x2 (negative to date)  17 BCx x2 (negative)  17 paracentesis fluid WCx x1 (negative)  17 BCx x2 (negative)  17 UCx x1 (negative)  17 buttock WCx x1 (negative)  17 BCx x2 (Escherichia coli)    Recent Labs      17   0326  17   1413  17   0313  17   0435   WBC  15.6*  13.8*  12.8*  12.8*   NEUTSPOLYS  93.80*   --   93.00*  87.10*   BANDSSTABS  0.90   --   0.90   --      Recent Labs      17   0326  17   0313  17   0435   BUN  32*  39*  52*   CREATININE  2.57*  2.34*  2.33*   ALBUMIN  1.9*  2.6*  2.3*     Recent Labs      17   0313  17   0435   VANCORANDOM  20.3  11.8     Intake/Output Summary (Last 24 hours) at 17 0834  Last data filed at 17 0700   Gross per 24 hour   Intake    100 ml   Output    350 ml   Net   -250 ml      Blood pressure 138/63, pulse 77, temperature 36.6 °C (97.8 °F), resp. rate 15, height 1.6 m (5' 3\"), weight 76.2 kg (167 lb 15.9 oz), SpO2 94 %, not currently breastfeeding. Temp (24hrs), Av.2 °C (97.1 °F), Min:35.9 °C (96.7 °F), Max:36.6 °C (97.8 °F)    Estimated Creatinine Clearance: 18.8 mL/min (by C-G formula based on " Cr of 2.33).    A/P   1. Vancomycin dose change: 600 mg iv x1 (post-dialysis; ~7.5 mg/kg)   2. Next vancomycin level: 7/6/17 @0300  3. Goal trough: 12-16 mcg/mL  4. Comments: VS stable. Afebrile. WBC slightly elevated/stable. TTS iHD per latest nephrology evaluation. No new microbiology. Most recent random level with downtrend. Concerns for accumulation of vancomycin identified. Will give ~7.5 mg/kg once post-iHD today and order random level prior to next scheduled session. Pharmacy will follow and continue to adjust as appropriate. Recommend de-escalation as clinically appropriate given lack of identifiable organism.    Kevin Nathan, PHARMD

## 2017-07-04 NOTE — PROGRESS NOTES
Nephrology Progress Note, Adult, Complex               Author: Deisy Smitha  Date & Time created: 7/4/2017  12:13 PM     Interval History:  82 year old with Hep C, cirrhosis, with BENNETT  -HENRY most likely.  Less confused  No complaints  HD TTS  Seen and examined during HD -tolerates well    Review of Systems:  Review of Systems   Constitutional: Positive for malaise/fatigue. Negative for fever and chills.   HENT: Negative.    Eyes: Negative.    Respiratory: Negative for cough, shortness of breath and wheezing.    Cardiovascular: Positive for leg swelling. Negative for chest pain, palpitations and orthopnea.   Gastrointestinal: Negative for nausea, vomiting and abdominal pain.   Skin: Negative.    Neurological: Positive for weakness.       Physical Exam:  Physical Exam   Constitutional: She appears well-developed and well-nourished. She appears ill. No distress.   HENT:   Head: Normocephalic and atraumatic.   Mouth/Throat: Oropharynx is clear and moist.   Eyes: Conjunctivae and EOM are normal. Pupils are equal, round, and reactive to light.   Neck: Normal range of motion. Neck supple.   Cardiovascular: Normal rate and regular rhythm.    Pulmonary/Chest: Effort normal and breath sounds normal. No respiratory distress. She has no wheezes. She has no rales.   Abdominal: Soft. She exhibits distension. There is no tenderness.   Musculoskeletal: She exhibits edema. She exhibits no tenderness.   Neurological: She is alert.   Skin: Skin is warm and dry.       Labs:        Invalid input(s): [id number]  Recent Labs      07/04/17   0435   BNPBTYPENAT  310*     Recent Labs      07/02/17   0326  07/03/17   0313  07/04/17   0435   SODIUM  135  137  137   POTASSIUM  3.7  3.5*  3.6   CHLORIDE  105  104  106   CO2  24  24  23   BUN  32*  39*  52*   CREATININE  2.57*  2.34*  2.33*   MAGNESIUM   --    --   1.8   PHOSPHORUS   --    --   4.6*   CALCIUM  8.1*  8.5  8.3*     Recent Labs      07/02/17   0326  07/03/17   0313  07/04/17    0435   ALTSGPT  78*  60*  58*   ASTSGOT  70*  55*  55*   ALKPHOSPHAT  117*  111*  129*   TBILIRUBIN  7.9*  7.6*  6.9*   GLUCOSE  96  91  106*     Recent Labs      17   1413  173  17   0435   RBC  2.30*  2.66*  2.67*   HEMOGLOBIN  7.8*  8.8*  8.8*   HEMATOCRIT  25.2*  28.3*  28.5*   PLATELETCT  26*  24*  23*     Recent Labs      17   0326  173  17   0435   WBC  15.6*  13.8*  12.8*  12.8*   NEUTSPOLYS  93.80*   --   93.00*  87.10*   LYMPHOCYTES  0.90*   --   2.60*  6.00*   MONOCYTES  3.50   --   3.50  6.00   EOSINOPHILS  0.90   --   0.00  0.90   BASOPHILS  0.00   --   0.00  0.00   ASTSGOT  70*   --   55*  55*   ALTSGPT  78*   --   60*  58*   ALKPHOSPHAT  117*   --   111*  129*   TBILIRUBIN  7.9*   --   7.6*  6.9*           Hemodynamics:  Temp (24hrs), Av.2 °C (97.2 °F), Min:35.9 °C (96.7 °F), Max:36.6 °C (97.8 °F)  Temperature: 36.3 °C (97.3 °F)  Pulse  Av.2  Min: 63  Max: 99   Blood Pressure : 142/67 mmHg     Respiratory:    Respiration: 17, Pulse Oximetry:  (Dialysis)        RUL Breath Sounds: Diminished, RML Breath Sounds: Diminished, RLL Breath Sounds: Diminished, SARAH Breath Sounds: Diminished, LLL Breath Sounds: Diminished  Fluids:    Intake/Output Summary (Last 24 hours) at 17 1213  Last data filed at 17 1155   Gross per 24 hour   Intake    600 ml   Output   2850 ml   Net  -2250 ml        GI/Nutrition:  Orders Placed This Encounter   Procedures   • DIET ORDER     Standing Status: Standing      Number of Occurrences: 1      Standing Expiration Date:      Order Specific Question:  Diet:     Answer:  2 Gram Sodium [7]      Comments:  Strict 1:1 feeding; crush meds in applesauce/pudding, please encourage PO intake     Order Specific Question:  Texture/Fiber modifications:     Answer:  Dysphagia 2(Pureed/Chopped)specify fluid consistency(question 6) [2]     Order Specific Question:  Consistency/Fluid modifications:     Answer:  Thin  Liquids [3]     Order Specific Question:  Miscellaneous modifications:     Answer:  SLP - 1:1 Supervision by Nursing [21]     Medical Decision Making, by Problem:  Active Hospital Problems    Diagnosis   • Upper GI bleed [K92.2]   • Bacteremia due to Escherichia coli [R78.81]   • Sepsis (CMS-HCC) [A41.9]   • Alcoholic cirrhosis of liver with ascites (CMS-HCC) [K70.31]   • Chronic hepatitis C virus infection (CMS-HCC) [B18.2]   • Acute kidney injury (CMS-HCC) [N17.9]   • Urinary tract infection [N39.0]   • Hyperbilirubinemia [E80.6]   • Thrombocytopenia (CMS-HCC) [D69.6]   • Elevated troponin [R74.8]   • Abdominal pain [R10.9]   • Alcoholic hepatitis [K70.10]   • Excoriation of buttock [S30.810A]     1. BENNETT/HENRY/HD -seen and examined during HD -please see dialysis flow sheet for details  2. Electrolytes ; well controlled  3. Acidosis: corrected  4. Anemia, Hgb stable  Recs;  -HD today  -monitor BMP  -diet renal  -all meds to renal doses  -Will follow   Labs reviewed and Medications reviewed

## 2017-07-04 NOTE — THERAPY
"Speech Language Therapy dysphagia treatment completed.   Functional Status:  Dysphagia therapy completed this date. Patient awake, but still confused with increased processing time. She consumed PO trials of soft solids with mildly prolonged mastication, but good oral clearance and no overt signs of aspiration. Patient stated she likes chocolate ice cream and Boost. She still has poor appetite and requires encouragement to eat/drink more than a few bites per RN and CNA.   Recommendations:  Recommend diet upgrade to Dysphagia 2/thin liquids with 1:1 assistance.  Plan of Care: Will benefit from Speech Therapy 3 times per week  Post-Acute Therapy: Discharge to a transitional care facility for continued skilled therapy services.    See \"Rehab Therapy-Acute\" Patient Summary Report for complete documentation.     "

## 2017-07-04 NOTE — PROGRESS NOTES
Pt care assumed. Pt lying comfortably in bed. A&O x 1. Bilateral soft wrist restraints in place with active order. No distress present; no pain. Call light, phone, and bedside table within reach. White board updated and plan of care discussed with patient. Bed alarm and strip alarm set and in place. No concerns present at this time. Will continue to monitor.

## 2017-07-04 NOTE — PROGRESS NOTES
[facility] Melchor Case returned call regarding pt's lactic acid. No new orders received at this time, but lactic acid redraw in place for 0015 7/4 (Q4H). Will update Dr Case of pt's next lactic acid and midnight vital signs. Currently pt's vital signs are stable, no hypotension, tachycardia, or fever present. Continuing to monitor pt closely.

## 2017-07-05 NOTE — PROGRESS NOTES
Pt care assumed. Pt resting comfortably in bed. A&O x 3. Bilateral soft wrist restraints in place with active order. No distress present; no pain. Call light, phone, and bedside table within reach. White board updated and plan of care discussed with patient. Bed alarm and strip alarm set and in place. No concerns present at this time. Will continue to monitor.

## 2017-07-05 NOTE — PROGRESS NOTES
Paul from Lab called with critical result of Platelets of 17 at 0000. Critical lab result read back to Paul.   Dr. Angie Case notified of critical lab result at 0005.  Critical lab result read back by Dr. Angie Case.

## 2017-07-05 NOTE — THERAPY
"Speech Language Therapy dysphagia treatment completed.   Functional Status:  fxnl status for a modified diet with cues to encourage amt taken; caloric dense food and liquids encouraged over for example, diet soda.  Recommendations: Continued D2/thin liquid diet with dietician consult for supplements if not being followed.  Plan of Care: Will benefit from Speech Therapy 3 times per week  Post-Acute Therapy: Discharge to a transitional care facility for continued skilled therapy services.    See \"Rehab Therapy-Acute\" Patient Summary Report for complete documentation.     "

## 2017-07-05 NOTE — PROGRESS NOTES
GI Progress Note:    Pierre Palmer  Date & Time note created:    7/5/2017   3:49 PM       Patient ID:   Name:             Abigail Bradley   YOB: 1935  Age:                 82 y.o.  female   MRN:               [id number]                                                               Subjective:   More alert, apetite improved, decision made to involve palliative care        Past Medical History:   History reviewed. No pertinent past medical history.    Past Surgical History:  History reviewed. No pertinent past surgical history.    Hospital Medications:    Current facility-administered medications:   •  piperacillin-tazobactam (ZOSYN) 2.25 g in  mL IVPB, 2.25 g, Intravenous, Q12HRS, Sondra Bennett M.D., Stopped at 07/05/17 0951  •  piperacillin-tazobactam (ZOSYN) 0.75 g in  mL IVPB, 0.75 g, Intravenous, TUE+THU+SAT, Sondra Bennett M.D., Stopped at 07/04/17 1803  •  HEPARIN INDUCED PLATELET AB(HIT), , , Once **AND** Pharmacy Consult: HIT Monitoring, , Other, PRN, Sondra Bennett M.D.  •  anticoagulant cit dextrose soln A (ACD) 10 mL in bottle/bag empty 10 mL vial, , Intracatheter, DIALYSIS PRN, Deisy Bone M.D.  •  MD ALERT... vancomycin per pharmacy protocol, , Other, pharmacy to dose, Sondra Bennett M.D.  •  Action is required: Protocol 1073 Hypoglycemia has been implemented, , , Once **AND** Protocol 1073 Inclusion Criteria, , , CONTINUOUS **AND** Protocol 1073 NOTIFY, , , Once **AND** Protocol 1073 Initiate protocol immediately if FSBG is less than or equal to 70 mg/dL, , , CONTINUOUS **AND** glucose 4 g chewable tablet 16 g, 16 g, Oral, Q15 MIN PRN **AND** dextrose 50% (D50W) injection 25 mL, 25 mL, Intravenous, Q15 MIN PRN, Ivana Guthrie M.D.  •  omeprazole (PRILOSEC) capsule 20 mg, 20 mg, Oral, BID, Wesley Duggan M.D., 20 mg at 07/05/17 0924  •  NS (BOLUS) infusion 250 mL, 250 mL, Intravenous, DIALYSIS EYAL, Alfonso Bermudez M.D.  •  albumin human 25% solution 12.5 g, 12.5 g,  "Intravenous, DIALYSIS PRN, Alfonso Bermudez M.D., Stopped at 07/02/17 0954  •  lactulose 20 GM/30ML solution 15 mL, 15 mL, Oral, BID, Greg Diaz M.D., 15 mL at 07/05/17 0923  •  rifaximin (XIFAXAN) tablet 550 mg, 550 mg, Oral, BID, Piper Grant M.D., 550 mg at 07/05/17 0924  •  pentoxifylline CR (TRENTAL) tablet 400 mg, 400 mg, Oral, DAILY, Greg Diaz M.D., 400 mg at 07/05/17 0924    Medication Allergy:  Allergies   Allergen Reactions   • Heparin      7/2/17 HIT Ab (positive); 7/2/17 JARETT (in process)         Physical Exam:  Vitals/ General Appearance:   Weight/BMI: Body mass index is 29.77 kg/(m^2).  Blood pressure 139/65, pulse 74, temperature 36.2 °C (97.2 °F), resp. rate 18, height 1.6 m (5' 3\"), weight 76.2 kg (167 lb 15.9 oz), SpO2 93 %, not currently breastfeeding.  Filed Vitals:    07/04/17 2308 07/05/17 0345 07/05/17 0843 07/05/17 1152   BP: 142/75 147/92 150/81 139/65   Pulse: 81 77 75 74   Temp: 36.1 °C (96.9 °F) 36 °C (96.8 °F) 36.2 °C (97.1 °F) 36.2 °C (97.2 °F)   Resp: 18 18 16 18   Height:       Weight:       SpO2: 92% 94% 93% 93%       Heart: RRR  Lungs: Clear  Abdomen: +BS, distended, nontender      Lab Data Review:  Recent Labs      07/03/17   0313  07/04/17   0435  07/05/17   0224   WBC  12.8*  12.8*  9.7   RBC  2.66*  2.67*  2.43*   HEMOGLOBIN  8.8*  8.8*  8.0*   HEMATOCRIT  28.3*  28.5*  26.0*   MCV  106.4*  106.7*  107.0*   MCH  33.1*  33.0  32.9   MCHC  31.1*  30.9*  30.8*   RDW  74.0*  73.7*  75.3*   PLATELETCT  24*  23*  17*   MPV  12.8  13.0*  12.9     Recent Labs      07/03/17 0313 07/04/17 0435 07/05/17 0224   SODIUM  137  137  137   POTASSIUM  3.5*  3.6  3.3*   CHLORIDE  104  106  104   CO2  24  23  27   GLUCOSE  91  106*  103*   BUN  39*  52*  33*   CREATININE  2.34*  2.33*  1.72*   CALCIUM  8.5  8.3*  8.8         Recent Labs      07/04/17 0435   BNPBTYPENAT  310*     Recent Labs      07/04/17 0435   BNPBTYPENAT  310*     Recent Labs      07/03/17 0313  " 07/04/17 0435 07/05/17 0224   SODIUM  137  137  137   POTASSIUM  3.5*  3.6  3.3*   CHLORIDE  104  106  104   CO2  24  23  27   GLUCOSE  91  106*  103*   BUN  39*  52*  33*     Recent Labs      07/03/17   0313  07/04/17 0435 07/05/17 0224   SODIUM  137  137  137   POTASSIUM  3.5*  3.6  3.3*   CHLORIDE  104  106  104   CO2  24  23  27   BUN  39*  52*  33*   CREATININE  2.34*  2.33*  1.72*   MAGNESIUM   --   1.8   --    PHOSPHORUS   --   4.6*   --    CALCIUM  8.5  8.3*  8.8              Imaging/Procedures Review:        Assessment and plan:  1. SBP- improved with broadening of antibiotic coverage with decrease in WBC count  2. E.Coli bacteremia- on antibiotics  3. BENNETT- in part due to contrast induced injury +/- component HRS and on dialysis  4. Hep.C and ETOH liver disease- MELD score 26 and decision made to involve palliative care  5. Thrombocytopenia- due in part to Heparin

## 2017-07-05 NOTE — PROGRESS NOTES
Nephrology Progress Note, Adult, Complex               Author: Deisy Keitakp  Date & Time created: 7/5/2017  1:55 PM     Interval History:  82 year old with Hep C, cirrhosis, with BENNETT  -HENRY most likely.  More alert  C/o fatigue  Low Plt -HIT Ab (+)  HD TTS -off heparin      Review of Systems:  Review of Systems   Constitutional: Positive for malaise/fatigue. Negative for fever and chills.   HENT: Negative for congestion, nosebleeds and sore throat.    Eyes: Negative.    Respiratory: Negative for cough, shortness of breath and wheezing.    Cardiovascular: Negative for chest pain, palpitations and orthopnea.   Gastrointestinal: Negative for nausea, vomiting and abdominal pain.   Genitourinary: Negative for dysuria.   Skin: Negative for itching and rash.   Neurological: Negative for headaches.       Physical Exam:  Physical Exam   Constitutional: She appears well-developed and well-nourished. She appears ill. No distress.   HENT:   Head: Normocephalic and atraumatic.   Mouth/Throat: Oropharynx is clear and moist.   Eyes: Conjunctivae and EOM are normal. Pupils are equal, round, and reactive to light.   Neck: Normal range of motion. Neck supple.   Cardiovascular: Normal rate and regular rhythm.    Pulmonary/Chest: Effort normal and breath sounds normal. No respiratory distress. She has no wheezes. She has no rales.   Abdominal: Soft. She exhibits distension. There is no tenderness.   Musculoskeletal: She exhibits edema. She exhibits no tenderness.   Neurological: She is alert.   Skin: Skin is warm and dry.       Labs:        Invalid input(s): [id number]  Recent Labs      07/04/17   0435   BNPBTYPENAT  310*     Recent Labs      07/03/17   0313  07/04/17   0435  07/05/17   0224   SODIUM  137  137  137   POTASSIUM  3.5*  3.6  3.3*   CHLORIDE  104  106  104   CO2  24  23  27   BUN  39*  52*  33*   CREATININE  2.34*  2.33*  1.72*   MAGNESIUM   --   1.8   --    PHOSPHORUS   --   4.6*   --    CALCIUM  8.5  8.3*  8.8      Recent Labs      174   ALTSGPT  60*  58*  45   ASTSGOT  55*  55*  47*   ALKPHOSPHAT  111*  129*  96   TBILIRUBIN  7.6*  6.9*  7.5*   GLUCOSE  91  106*  103*     Recent Labs      17   RBC  2.66*  2.67*  2.43*   HEMOGLOBIN  8.8*  8.8*  8.0*   HEMATOCRIT  28.3*  28.5*  26.0*   PLATELETCT  24*  23*  17*     Recent Labs      17   WBC  12.8*  12.8*  9.7   NEUTSPOLYS  93.00*  87.10*  90.50*   LYMPHOCYTES  2.60*  6.00*  6.90*   MONOCYTES  3.50  6.00  1.70   EOSINOPHILS  0.00  0.90  0.90   BASOPHILS  0.00  0.00  0.00   ASTSGOT  55*  55*  47*   ALTSGPT  60*  58*  45   ALKPHOSPHAT  111*  129*  96   TBILIRUBIN  7.6*  6.9*  7.5*           Hemodynamics:  Temp (24hrs), Av.2 °C (97.1 °F), Min:36 °C (96.8 °F), Max:36.3 °C (97.3 °F)  Temperature: 36.2 °C (97.2 °F)  Pulse  Av.3  Min: 63  Max: 99   Blood Pressure : 139/65 mmHg     Respiratory:    Respiration: 18, Pulse Oximetry: 93 %        RUL Breath Sounds: Clear, RML Breath Sounds: Diminished, RLL Breath Sounds: Diminished, SARAH Breath Sounds: Clear, LLL Breath Sounds: Diminished  Fluids:    Intake/Output Summary (Last 24 hours) at 17 1355  Last data filed at 17 0700   Gross per 24 hour   Intake    100 ml   Output      0 ml   Net    100 ml        GI/Nutrition:  Orders Placed This Encounter   Procedures   • DIET ORDER     Standing Status: Standing      Number of Occurrences: 1      Standing Expiration Date:      Order Specific Question:  Diet:     Answer:  2 Gram Sodium [7]      Comments:  Strict 1:1 feeding; crush meds in applesauce/pudding, please encourage PO intake     Order Specific Question:  Texture/Fiber modifications:     Answer:  Dysphagia 2(Pureed/Chopped)specify fluid consistency(question 6) [2]     Order Specific Question:  Consistency/Fluid modifications:     Answer:  Thin Liquids [3]     Order Specific  Question:  Miscellaneous modifications:     Answer:  SLP - 1:1 Supervision by Nursing [21]     Medical Decision Making, by Problem:  Active Hospital Problems    Diagnosis   • Upper GI bleed [K92.2]   • Bacteremia due to Escherichia coli [R78.81]   • Sepsis (CMS-HCC) [A41.9]   • Alcoholic cirrhosis of liver with ascites (CMS-HCC) [K70.31]   • Chronic hepatitis C virus infection (CMS-HCC) [B18.2]   • Acute kidney injury (CMS-HCC) [N17.9]   • Urinary tract infection [N39.0]   • Hyperbilirubinemia [E80.6]   • Thrombocytopenia (CMS-HCC) [D69.6]   • Elevated troponin [R74.8]   • Abdominal pain [R10.9]   • Alcoholic hepatitis [K70.10]   • Excoriation of buttock [S30.810A]     1. BENNETT/HD -poor UOP -continue dialysis on TTS schedule  2. Hyponatremia: normalized, mild hypokalemia  3. Acidosis: corrected  4. Anemia: low Hb level -epogen with HD  Recs;  -HD  - TTS  -monitor BMP  -diet renal  -all meds to renal doses  -Will follow   Labs reviewed and Medications reviewed

## 2017-07-05 NOTE — THERAPY
"Occupational Therapy Treatment completed with focus on ADLs, ADL transfers, patient education, caregiver training and upper extremity function.  Functional Status:  Total Assist with LB ADLs and Max A x2 SPT  Plan of Care: Will benefit from Occupational Therapy 3 times per week  Discharge Recommendations:  Equipment Will Continue to Assess for Equipment Needs. Post-acute therapy Discharge to a transitional care facility for continued skilled therapy services.    Patient seen for OT treat focused on EOB trunk control and ADL participation in chair position. Patient demos slight gains in ADL participation and still necessitates Total Assist with LB ADLs and Max A x2 SPT. Patient's bed broken, call bell missing. nursing called to replace bed and issued a call bell. Patient agreeable to in room exercises with  motivating participation. Patient would continue to benefit from skilled OT in this setting followed by rehab prior to DC home with services. x3 week.      next session should focus on functional transfers towards BSC/toilet and seated ADLs with varied support.    See \"Rehab Therapy-Acute\" Patient Summary Report for complete documentation.   "

## 2017-07-05 NOTE — PROGRESS NOTES
Assumed care of patient at 0715, received bedside report from night shift RN. Bed is locked and in lowest position with call light within reach. Waffle mattress in place, Q2 turns in place, heel float boots in use. Patient updated on plan of care, no complaints or pain at this time. White board updated. Assessment completed. Pt A&Ox3- disoriented to event. BL soft wrist restraints in place with active order. restraints removed- ROM performed. Restraints reapplied. Tele monitor in place and cardiac rhythm being monitored. All needs met at this time.

## 2017-07-05 NOTE — PROGRESS NOTES
[facility] [place] [facility] Interval Note    Name Abigail Bradley     1935   Age/Sex 82 y.o. female   MRN [id number]   Code Status DNR     After 5PM or if no immediate response to page, please call for cross-coverage  Attending/Team: Dr. Crain/Melchor Use Tiger Text to page  6AM-5PM  Call [phone] to page afterhours   1st Call - Day Intern (R1):   Dr. Barrientos 2nd Call - Day Sr. Resident (R2/R3):   Dr. Crain         Chief complaint/ reason for interval visit (Primary Diagnosis)   Advanced cirrhosis.   SBP  ? HIT, serotonin pending  Acute renal failure s/p HD.     Interval Problem Daily Status Update    Better consciousness level.   Improved appetite, refused NG tube clearly and finished 80% of lunch today.   Talked to his  (Kelby) at bedside. Explained her condition, both of them agree with palliative consult.   Keep anti for SBP (Vanco + Zosyn)  Plt still less than 20K, mild bleeding from HD catheter.   No acute thrombosis found.       Review of Systems   Constitutional: Denies fevers.  Eyes: Denies changes in vision, no eye pain  Ears/Nose/Throat/Mouth: Denies nasal congestion or sore throat. Some bleeding at right side neck.   Cardiovascular: Denies chest pain or palpitations   Respiratory: Denies shortness of breath , Denies cough  Gastrointestinal/Hepatic: better appetitie, some fullness.   Genitourinary: no complaints.   Musculoskeletal/Rheum: No complaints   Skin/Breast: Some small bruise on body, no progression.   Neurological: Denies headache. Denied focal weakness/parasthesias  Psychiatric: Denies mood disorder   All other systems were reviewed and are negative (AMA/CMS criteria)        Consultants/Specialty  GI  Nephrology  Palliative     Disposition  Inpatient with guarded prognosis    Quality Measures  Medications reviewed and Labs reviewed    HD line at right neck.   العراقي.  DVT prophylaxis - mechanical: SCDs  On Zosyn and Vancomycin.            Physical Exam        Filed Vitals:    07/04/17 2308 07/05/17 0345 07/05/17 0843 07/05/17 1152   BP: 142/75 147/92 150/81 139/65   Pulse: 81 77 75 74   Temp: 36.1 °C (96.9 °F) 36 °C (96.8 °F) 36.2 °C (97.1 °F) 36.2 °C (97.2 °F)   Resp: 18 18 16 18   Height:       Weight:       SpO2: 92% 94% 93% 93%     Body mass index is 29.77 kg/(m^2).    Oxygen Therapy:  Pulse Oximetry: 93 %, O2 (LPM): 0, O2 Delivery: None (Room Air)    Physical Exam   HEENT: grossly normal, HD tube with mild oozing at right neck.   Cardiovascular: Normal heart rate, Normal rhythm   Lungs: Respiratory effort is normal. Normal breath sounds except for mild crackles at base lung field.   Abdomen: soft, some fullness, better bowel sound today (more active).   Skin: No erythema, No rash, but some bruise noted over her body (not progressing)  Lower limbs: normal, 1+  Neurologic: Alert & oriented x 2, Normal motor function, Normal sensory function, No focal deficits noted, cranial nerves II through XII are normal  PSY: stable mood.             Lab Data Review:      7/4/2017  8:08 AM    Recent Labs      07/03/17 0313 07/04/17 0435 07/05/17 0224   SODIUM  137  137  137   POTASSIUM  3.5*  3.6  3.3*   CHLORIDE  104  106  104   CO2  24  23  27   BUN  39*  52*  33*   CREATININE  2.34*  2.33*  1.72*   MAGNESIUM   --   1.8   --    PHOSPHORUS   --   4.6*   --    CALCIUM  8.5  8.3*  8.8       Recent Labs      07/03/17 0313 07/04/17 0435 07/05/17 0224   ALTSGPT  60*  58*  45   ASTSGOT  55*  55*  47*   ALKPHOSPHAT  111*  129*  96   TBILIRUBIN  7.6*  6.9*  7.5*   GLUCOSE  91  106*  103*       Recent Labs      07/03/17 0313 07/04/17 0435 07/05/17 0224   RBC  2.66*  2.67*  2.43*   HEMOGLOBIN  8.8*  8.8*  8.0*   HEMATOCRIT  28.3*  28.5*  26.0*   PLATELETCT  24*  23*  17*       Recent Labs      07/03/17   0313  07/04/17   0435  07/05/17   0224   WBC  12.8*  12.8*  9.7   NEUTSPOLYS  93.00*  87.10*  90.50*   LYMPHOCYTES  2.60*  6.00*  6.90*   MONOCYTES  3.50  6.00   1.70   EOSINOPHILS  0.00  0.90  0.90   BASOPHILS  0.00  0.00  0.00   ASTSGOT  55*  55*  47*   ALTSGPT  60*  58*  45   ALKPHOSPHAT  111*  129*  96   TBILIRUBIN  7.6*  6.9*  7.5*           Assessment/Plan     Alcoholic cirrhosis of liver with ascites (CMS-HCC)  Likely alcohol related from chronic alcohol abuse vs hepatitis C.  Cirrhosis was identified on ultrasound.  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  Underwent a paracentesis with IR on 7/1 which drained 4L  Fluid showed 500 WBC with 86% neutrophils  Culture is negative  Keep current Zosyn/Vanco for SBP.     Alcoholic hepatitis  Patient's liver enzymes elevated but patient's last drink was a month ago.  Could be having alcoholic hepatitis since a month.  INR is elevated and T. Bili is at 10.  Maddrey's discriminant function is 60.6 which indicates steroid use to reduce mortality but it is unclear how much bilirubin is from the hepatitis vs biliary obstruction.  In any case starting steroids is contraindicated as patient is septic.  On pentoxifylline at renal dosing.  Also on rifaximin and lactulose. Ammonia has been stable since admission.  Gastroenterology following, appreciate recommendations.      Urinary tract infection  UA revealed the presence of a UTI.  Zosyn/Vanco for SBP should also cover her UTI.   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 However ATN or hepatorenal  syndrome could be another possibility.   FeNa is 0.11% showing pre-renal.   Urine eosonophils are negative.  Daily urine output around 300-400cc.   Nephro team onboard. Will do 3 times/week HD now.       Sepsis (CMS-[facility])  Source of infection is likely UTI/SBP  Blood cultures identified E.coli in 2/2 bottles on 06/21  Results from ascites fluid culture negative, morphology showed >250 PMNs  C diff PCR toxin taken on 7/1 are negative  Blood cultures taken on 06/30 were negative   Continue Zosyn and Vancomycin      Upper GI bleed  Patient noted to have black stools by RN.  Occult stool positive.  Vitals are stable.  Serial H/Hs show minimal drop in Hb but has been stable.  Most likely is having an occult bleed.  Gastroenterology following patient, appreciate recommendations.    Hepatitis C antibody test positive  Likely has had exposure in the past.  HCR RNA PCR indicates viral load.  May consider treatment down the line as an outpatient.    Altered mental status  Better on 07/05.   Good discussion regarding her management and prognosis.    Kelby at bedside.   Agree to talk to palliative team.

## 2017-07-06 NOTE — PROGRESS NOTES
"  Gastroenterology Progress Note     Author: Agustín LOCKE Liliya   Date & Time Created: 7/6/2017 9:02 AM    Interval History:  Problem: SBP, end-stage liver disease, hepatitis C, alcohol    This is an 82 year old woman admitted 6/21/2017 with abdominal pain, jaundice, ascites, and renal failure.  She was seen by Dr. 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  She has CT scan evidence of ascites, cirrhosis, splenorenal and gastroesophageal varices.  Her last paracentesis 7/1/1017 was consistent with SBP and she is being treated with vancomycin and Zosyn.    Today she is a little confused (doesn't know the name of hospital) and complains that \"they make me eat, but then won't let me go to the bathroom).  Her abdomen is distended and she admits it is uncomfortable.  She denies nausea and vomiting.    Review of Systems:  ROS    Physical Exam:  Physical Exam   Constitutional: No distress.   Restrained   Eyes: Scleral icterus is present.   Cardiovascular: Normal rate, regular rhythm and normal heart sounds.    Pulmonary/Chest: Effort normal and breath sounds normal. No respiratory distress. She has no wheezes.   Abdominal: Soft. She exhibits distension (Moderate to marked, but not tense; central tympany and flank dullness present). She exhibits no mass. There is no tenderness. There is no guarding.   Musculoskeletal: She exhibits no edema.   Neurological: She is alert.       Labs:        Invalid input(s): [id number]  Recent Labs      07/04/17 0435   BNPBTYPENAT  310*     Recent Labs      07/04/17 0435 07/05/17 0224 07/06/17 0317   SODIUM  137  137  143   POTASSIUM  3.6  3.3*  3.5*   CHLORIDE  106  104  107   CO2  23  27  24   BUN  52*  33*  43*   CREATININE  2.33*  1.72*  2.28*   MAGNESIUM  1.8   --    --    PHOSPHORUS  4.6*   --    --    CALCIUM  8.3*  8.8  8.9     Recent Labs      07/04/17 0435 07/05/17 0224 07/06/17 0317   ALTSGPT  58*  45  53*   ASTSGOT  55*  47*  69* "   ALKPHOSPHAT  129*  96  137*   TBILIRUBIN  6.9*  7.5*  7.0*   GLUCOSE  106*  103*  107*     Recent Labs      17   0435  17   RBC  2.67*  2.43*  2.58*   HEMOGLOBIN  8.8*  8.0*  8.6*   HEMATOCRIT  28.5*  26.0*  27.5*   PLATELETCT  23*  17*  21*     Recent Labs      17   WBC  12.8*  9.7  10.9*   NEUTSPOLYS  87.10*  90.50*  90.20*   LYMPHOCYTES  6.00*  6.90*  4.50*   MONOCYTES  6.00  1.70  5.30   EOSINOPHILS  0.90  0.90  0.00   BASOPHILS  0.00  0.00  0.00   ASTSGOT  55*  47*  69*   ALTSGPT  58*  45  53*   ALKPHOSPHAT  129*  96  137*   TBILIRUBIN  6.9*  7.5*  7.0*     Hemodynamics:  Temp (24hrs), Av.2 °C (97.1 °F), Min:35.8 °C (96.5 °F), Max:36.7 °C (98 °F)  Temperature: 35.9 °C (96.7 °F)  Pulse  Av.5  Min: 63  Max: 99   Blood Pressure : 138/73 mmHg     Respiratory:    Respiration: 20, Pulse Oximetry: 95 %        RUL Breath Sounds: Clear, RML Breath Sounds: Diminished, RLL Breath Sounds: Diminished, SARAH Breath Sounds: Clear, LLL Breath Sounds: Diminished  Fluids:    Intake/Output Summary (Last 24 hours) at 17 0902  Last data filed at 17 1800   Gross per 24 hour   Intake      0 ml   Output    300 ml   Net   -300 ml     Weight: 74.1 kg (163 lb 5.8 oz)  GI/Nutrition:  Orders Placed This Encounter   Procedures   • DIET ORDER     Standing Status: Standing      Number of Occurrences: 1      Standing Expiration Date:      Order Specific Question:  Diet:     Answer:  2 Gram Sodium [7]      Comments:  Strict 1:1 feeding; crush meds in applesauce/pudding, please encourage PO intake     Order Specific Question:  Texture/Fiber modifications:     Answer:  Dysphagia 2(Pureed/Chopped)specify fluid consistency(question 6) [2]     Order Specific Question:  Consistency/Fluid modifications:     Answer:  Thin Liquids [3]     Order Specific Question:  Miscellaneous modifications:     Answer:  SLP - 1:1 Supervision by Nursing [21]      Medical Decision Making, by Problem:  Active Hospital Problems    Diagnosis   • Bacteremia due to Escherichia coli [R78.81]   • Sepsis (CMS-HCC) [A41.9]   • Alcoholic cirrhosis of liver with ascites (CMS-HCC) [K70.31]   • Hepatitis C antibody test positive [R76.8]   • Acute kidney injury (CMS-HCC) [N17.9]   • Urinary tract infection [N39.0]   • Hyperbilirubinemia [E80.6]   • Thrombocytopenia (CMS-HCC) [D69.6]   • Alcoholic hepatitis [K70.10]   • Excoriation of buttock [S30.810A]   • Altered mental status [R41.82]     Impression:  1. SBP  She is slowly improving on broader spectrum antibiotics based upon falling WBC (though slight rise today).  2. E coli bacteremia.  3. Acute kidney disease due to IV contrast +/- HRS, on dialysis.  4. 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  Decompensated.  Poor prognosis.  5. Thrombocytopenia.  6. Mild encephalopathy, on rifaximin and lactulose.    Plan:  Continue antibiotics and supportive care.  Palliative care would be an appropriate consideration.    Core Measures

## 2017-07-06 NOTE — PROGRESS NOTES
[facility] [place] [facility] [place]val [place]    Name Abigail Bradley     1935   Age/Sex 82 y.o. female   MRN [id number]   Code Status DNR     After 5PM or if no immediate response to page, please call for cross-coverage  Attending/Team: Dr. Crain/Melchor Use Tiger Text to page  6AM-5PM  Call [phone] to page afterhours   1st Call - Day Intern (R1):   Dr. Barrientos 2nd Call - Day Sr. Resident (R2/R3):   Dr. Bennett         Chief complaint/ reason for interval visit (Primary Diagnosis)   Advanced Cirrhosis, SBP, Acute Kidney Failure requiring H/D    Interval Problem Daily Status Update    Patient was seen and examined at bedside. She is awake and alert and follows commands. She complains of being tired and being forced to eat too much. She notes that she feels like she needs to have a BM. Currently has a FMS in place. العراقي was removed and replaced today.       Review of Systems   Constitutional: Positive for malaise/fatigue. Negative for fever, chills and weight loss.   HENT: Negative for ear discharge, ear pain, hearing loss and nosebleeds.    Eyes: Negative for blurred vision, double vision, photophobia and discharge.   Respiratory: Negative for cough, hemoptysis, sputum production and stridor.    Cardiovascular: Negative for chest pain, palpitations, claudication and leg swelling.   Gastrointestinal: Negative for heartburn, nausea, vomiting, abdominal pain and blood in stool.   Genitourinary: Negative for dysuria, urgency and frequency.   Musculoskeletal: Negative for myalgias, back pain and neck pain.   Skin: Negative for itching and rash.   Neurological: Positive for weakness. Negative for dizziness, tingling, sensory change, focal weakness, seizures and headaches.   Endo/Heme/Allergies: Bruises/bleeds easily.   Psychiatric/Behavioral: Negative for depression, suicidal ideas and hallucinations. The patient is not nervous/anxious.         Consultants/Specialty  GI  Nephro  Palliative    Disposition  Inpatient with guarded prognosis    Quality Measures  Medications reviewed and Labs reviewed  العراقي Catheter: العراقي placed.  : HD line placed in Right neck.      DVT prophylaxis - mechanical: SCDs    : Zosyn and Vancomycin.            Physical Exam       Filed Vitals:    07/05/17 2335 07/06/17 0412 07/06/17 0820 07/06/17 1140   BP: 160/75 130/64 138/73 146/65   Pulse: 83 88 79 78   Temp: 36.1 °C (97 °F) 35.8 °C (96.5 °F) 35.9 °C (96.7 °F) 36.2 °C (97.1 °F)   Resp: 16 20 20 20   Height:       Weight:       SpO2: 98% 93% 95% 96%     Body mass index is 28.95 kg/(m^2). Weight: 74.1 kg (163 lb 5.8 oz)  Oxygen Therapy:  Pulse Oximetry: 96 %, O2 (LPM): 0, O2 Delivery: None (Room Air)    Physical Exam   Constitutional: No distress.   HENT:   Head: Normocephalic and atraumatic.   Mouth/Throat: Oropharynx is clear and moist.   Eyes: Conjunctivae and EOM are normal. Pupils are equal, round, and reactive to light. Scleral icterus is present.   Neck: Normal range of motion. Neck supple. No JVD present. No tracheal deviation present. No thyromegaly present.   Cardiovascular: Normal rate and regular rhythm.    Pulmonary/Chest: Effort normal and breath sounds normal. No respiratory distress. She has no wheezes. She has no rales.   Abdominal: Soft. Bowel sounds are normal. She exhibits distension. There is no tenderness. There is no rebound and no guarding.   Musculoskeletal: Normal range of motion. She exhibits no edema or tenderness.   Neurological: She is alert. No cranial nerve deficit.   Skin: No rash noted. She is not diaphoretic. No erythema.   Slight bruising on right and left forearms,    Psychiatric: Mood and affect normal.         Lab Data Review:      7/6/2017  12:29 PM    Recent Labs      07/04/17   0435  07/05/17   0224  07/06/17   0317   SODIUM  137  137  143   POTASSIUM  3.6  3.3*  3.5*   CHLORIDE  106  104  107   CO2  23  27  24   BUN  52*  33*  43*    CREATININE  2.33*  1.72*  2.28*   MAGNESIUM  1.8   --    --    PHOSPHORUS  4.6*   --    --    CALCIUM  8.3*  8.8  8.9       Recent Labs      07/04/17 0435 07/05/17 0224 07/06/17 0317   ALTSGPT  58*  45  53*   ASTSGOT  55*  47*  69*   ALKPHOSPHAT  129*  96  137*   TBILIRUBIN  6.9*  7.5*  7.0*   GLUCOSE  106*  103*  107*       Recent Labs      07/04/17 0435 07/05/17 0224 07/06/17 0317   RBC  2.67*  2.43*  2.58*   HEMOGLOBIN  8.8*  8.0*  8.6*   HEMATOCRIT  28.5*  26.0*  27.5*   PLATELETCT  23*  17*  21*       Recent Labs      07/04/17 0435 07/05/17 0224 07/06/17 0317   WBC  12.8*  9.7  10.9*   NEUTSPOLYS  87.10*  90.50*  90.20*   LYMPHOCYTES  6.00*  6.90*  4.50*   MONOCYTES  6.00  1.70  5.30   EOSINOPHILS  0.90  0.90  0.00   BASOPHILS  0.00  0.00  0.00   ASTSGOT  55*  47*  69*   ALTSGPT  58*  45  53*   ALKPHOSPHAT  129*  96  137*   TBILIRUBIN  6.9*  7.5*  7.0*           Assessment/Plan     Alcoholic cirrhosis of liver with ascites (CMS-HCC) (present on admission)  Assessment & Plan  Likely alcohol related from chronic alcohol abuse vs hepatitis C.  Cirrhosis was identified on ultrasound.  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  Underwent a paracentesis with IR on 7/1 which drained 4L  Fluid showed 500 WBC with 86% neutrophils  Culture is negative  Keep current Zosyn/Vanco for SBP.     Sepsis (CMS-HCC) (present on admission)  Assessment & Plan  Source of infection is likely UTI/SBP  Blood cultures identified E.coli in 2/2 bottles on 06/21  Results from ascites fluid culture negative, morphology showed >250 PMNs  C diff PCR toxin taken on 7/1 are negative  Blood cultures taken on 06/30 were negative   WBC increased today, but overall trend is downwards, will monitor  Continue Zosyn and Vancomycin      Acute kidney injury (CMS-HCC) (present on admission)  Assessment & Plan  Most likely etiology is contrast induced nephropathy. However ATN or  hepatorenal syndrome could be another possibility.   FeNa is 0.11% showing pre-renal.   Urine eosonophils are negative.  Daily urine output around 300-400cc.   Nephro team onboard. Will do 3 times/week HD now.       Urinary tract infection (present on admission)  Assessment & Plan  UA revealed the presence of a UTI.  Zosyn/Vanco for SBP should also cover her UTI.   Urine cultures discarded as they were non-specific  العراقي Catheter changed on 7/06/2017    Hyperbilirubinemia (present on admission)  Assessment & Plan  T. Bili elevated at 10.  50% of the elevation is indirect bilirubin and 50% is direct bilirubin indicating intrahepatic process.  Likely secondary to alcoholic vs viral hepatitis and/or cirrhosis.  Will continue to monitor.  MRCP does not reveal any obstruction.    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  HCR RNA PCR indicates viral load.  May consider treatment down the line as an outpatient.    Altered mental status  Assessment & Plan  Stable on 07/06   Palliative team will meet with patient when  is present      Alcoholic hepatitis (present on admission)  Assessment & Plan  Patient's liver enzymes elevated but patient's last drink was a month ago.  Could be having alcoholic hepatitis since a month.  INR is elevated and T. Bili is at 10.  Maddrey's discriminant function is 60.6 which indicates steroid use to reduce mortality but it is unclear how much bilirubin is from the hepatitis vs biliary obstruction.  In any case starting steroids is contraindicated as patient is septic.  On  pentoxifylline at renal dosing.  Also on rifaximin and lactulose. Ammonia has been stable since admission.  Gastroenterology following, appreciate recommendations.    Excoriation of buttock (present on admission)  Assessment & Plan  Etiology from being bed bound  Doesn't appear to need antibiotics as it does not look infected  Wound care consult in place

## 2017-07-06 NOTE — WOUND TEAM
"[facility]  Inpatient Services  Wound and Skin Care Progress Note    HPI, PMH, SH: Reviewed    WOUND TEAM FOLLOW UP: Reassessment of severe IAD to bilateral buttocks. Also consult for wounds to left upper thigh.  Unit where seen by Wound Team: T8.      SUBJECTIVE: \"Be careful!\" Patient afraid to turn.    Self Report / Pain Level: Patient wound not give number.     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         Periwound:     Blanching erythema.  Drainage:     Moderate, sanguinous.  Tissue Type and %:    100% red.  Wound Edges:    Attached.  Odor:      None.  Exposed structure(s):  None.  S&S of Infection:    None.  Measurements:   07/06/2017.  Length:     9.1 cm  Width:      1.8 cm  Depth:    0.2 cm      INTERVENTIONS BY WOUND TEAM: Assisted by CNA's, patient turned for assessment. Viscopaste removed, bilateral buttocks and sacrum cleansed with no rinse foaming cleanser and wash cloths. Pressure had to be held over wounds for 5 minutes due to bleeding. Wounds measured. Changed dressing to yellow petrolatum gauze, covered with non-adherent pads, secured with hypafix tape. Discontinued zinc cream and viscopaste wound therapy. The 2 wounds to upper left thigh are de-roofed edema blisters. Theres were covered with adhesive silicone foams, as was a heavily draining puncture site to LLQ abdomen. These wounds do not need advanced wound care, NURSING TO MANAGE.  Nursing to change IAD dressings Q day. Nursing to change adhesive silicone foams to LLQ abd and left upper thigh PRN.    Interdisciplinary consultation: Patient and nursing, CNA.    EVALUATION AND PROGRESS OF WOUND(S): IAD wounds have improved. Now that patient has a jarrett and a BMS, dressing placed that will stay in place longer. Yellow petrolatum gauze provides protection and optimal level of moisture " needed for wound healing. Non-adherent pad protects and absorbs minimal exudate.    Rationale for changes in Plan of Care: Patient now had a jarrett and a BMS to control incontinence.    Factors affecting wound healing: Renal failure, cirrhosis.  Goals: Wounds will decrease by 1% per week.     NURSING PLAN OF CARE:    Dressing changes: Continue previous Dressing Care orders:        See new Dressing Care orders:  X     Skin care: See Skin Care orders: X      Rectal tube care: See Rectal Tube Care orders:    X  Other orders:           WOUND TEAM PLAN OF CARE (X):   NPWT change 3 x week:        Dressing changes:       Follow up as needed:    X weekly.   Other:

## 2017-07-06 NOTE — PROGRESS NOTES
"Pharmacy Kinetics 82 y.o. female on vancomycin day #5 2017    Currently Dose: Vancomycin pulse dosing              Vancomycin 1900 mg iv x1                           Random level 7/3 = 20.3                          Random level  = 11.8              Vancomycin 600 mg iv x1     Random level  = 11.7            Indication for Treatment: intra-abdominal infection (persistent SBP)  ID Service Following: No    Pertinent history per medical record: Admitted on 2017 for cholangitis with concurrent renal failure. Nephrology consulted with plans for continued iHD. GI consulting for melena/cirrhosis.    Other antibiotics: piperacillin/tazobactam 2.25 gm iv q12h    Allergies: Heparin     List concerns for accumulation of vancomycin: renal dysfunction (w/iHD), electrolyte derangement, abnormal LFT's, cirrhosis, BMI ~29, low albumin/malnutrition    Pertinent cultures to date:    17 CDiff PCR (negative)  17 paracentesis fluid WCx x2 (negative to date)  17 BCx x2 (negative)  17 paracentesis fluid WCx x1 (negative)  17 BCx x2 (negative)  17 UCx x1 (negative)  17 buttock WCx x1 (negative)  17 BCx x2 (Escherichia coli)    Recent Labs      17   WBC  12.8*  9.7  10.9*   NEUTSPOLYS  87.10*  90.50*  90.20*     Recent Labs      17   BUN  52*  33*  43*   CREATININE  2.33*  1.72*  2.28*   ALBUMIN  2.3*  3.0*  3.0*     Recent Labs      175  17   VANCORANDOM  11.8  11.7     Intake/Output Summary (Last 24 hours) at 17 1207  Last data filed at 17 1800   Gross per 24 hour   Intake      0 ml   Output    300 ml   Net   -300 ml      Blood pressure 138/73, pulse 79, temperature 35.9 °C (96.7 °F), resp. rate 20, height 1.6 m (5' 3\"), weight 74.1 kg (163 lb 5.8 oz), SpO2 95 %, not currently breastfeeding. Temp (24hrs), Av.2 °C (97.1 °F), Min:35.8 °C (96.5 °F), " Max:36.7 °C (98 °F)    A/P   1. Vancomycin dose: 800 mg once   2. Next vancomycin level: 7/8/17 random with AM labs (ordered)  3. Goal trough: 12-16 mcg/mL  4. Comments: العراقي replaced today. Per GI the patient seems to be slowly improving on broader spectrum antibiotics. Plan is to continue with current antibiotics. Palliative has seen the patient. The patient's random level was 11.8 today. I will increase the dose slightly, re-dose, and check a level in 2 days as this is what the patient has been requiring. Pharmacy will continue to monitor and adjust dosing as clinically appropriate.    Colette Vasquez, PharmD

## 2017-07-06 NOTE — PROGRESS NOTES
Assumed care of patient at 0715, received bedside report from night shift RN. Bed is locked and in lowest position with call light within reach. Heel float boots in place. Pt on JAC mattress with waffle mattress. Q2 turns in place. Bilateral soft wrist restraints in place with active order. Restraints removed to perform ROM. Restraints reapplied. Patient updated on plan of care, no complaints or pain at this time. White board updated. Assessment completed. Pt A&Ox3- disoriented to event. Tele monitor in place and cardiac rhythm being monitored. All needs met at this time.

## 2017-07-06 NOTE — THERAPY
PT tx session attempted. Pt currently undergoing dialysis. Will reattempt PT tx session as able. Thanks

## 2017-07-06 NOTE — PROGRESS NOTES
Dr lindsay notified of platelet level of 21 critical value- also aware of albumin hgb calcium and K+ no orders recieved

## 2017-07-06 NOTE — PROGRESS NOTES
Nephrology/Hemodialysis note    Patient with BENNETT/ATN  Seen and examined during dialysis.  More confused today  VS stable  3 H/4 K/ UF 2-3 l  Please see dialysis flow sheet for details

## 2017-07-06 NOTE — CONSULTS
Reason for PC Consult: Advance Care Planning    Consulted by:   Dr. Bennett    Assessment:  General:   82 year old female admitted for ascending cholangitis on 6/21/17. Pt has a history of ETOH use, hep  C, hepatocellular disease, cirrhosis, and BENNETT. Pt and  drink alcohol daily, spouse has verbalized that he cannot care for her at home.    Dyspnea: No, 95% on RA  Last BM: 07/05/17    Pain: No  Depression: No    Spiritual:  Is Yazidism or spirituality important for coping with this illness? No  Has a  or spiritual provider visit been requested? No    Palliative Performance Scale: 40%    Advance Directive: None on File  DPOA: None on File, DALILA Bradley ([phone])    POLST: None on File    Code Status: DNR      Outcome:  PC RN introduced self and role of PC. Discussed pt's understanding of current clinical picture, pt verbalized understanding that she is sick and her liver functions are getting better. PC RN discussed health care wishes, pt is slightly confused about this. PC RN explained wishes for healthcare, pt would like to discuss when her  is present. PC RN provided contact card and encouraged her to call with any questions or concerns.         Plan:   Schedule a meeting with  present.     Thank you for allowing Palliative Care to participate in this patient's care. Please feel free to call [phone] with any questions or concerns.

## 2017-07-06 NOTE — PALLIATIVE CARE
Palliative Care follow-up  Called Kelby ([phone]) went straight to voicemail, left msg to return call.           Plan:   Schedule a time to meet with pt and spouse.    Thank you for allowing Palliative Care to participate in this patient's care. Please feel free to call [phone] with any questions or concerns.

## 2017-07-06 NOTE — PROGRESS NOTES
Pt dialyzed for 3 hrs today from 1215 to 1515.  Net UF 2.2L. Tolerated tx. CVC packed w/ ACD 1.5ml each limb. Old bloody drainage on R IJ CVC dsg noted.   See paper dialysis flow sheet for details.  Dr Bone in to see pt on tx. Report given to Marilyn Crews RN

## 2017-07-07 NOTE — PROGRESS NOTES
"Pharmacy Kinetics 82 y.o. female on vancomycin day #6 2017    Currently Dose: Vancomycin pulse dosing              Vancomycin 1900 mg iv x1                           Random level 7/3 = 20.3                          Random level  = 11.8              Vancomycin 600 mg iv x1                           Random level  = 11.7            Indication for Treatment: intra-abdominal infection (persistent SBP)  ID Service Following: No    Pertinent history per medical record: Admitted on 2017 for cholangitis with concurrent renal failure. Nephrology consulted with plans for continued iHD. GI consulting for melena/cirrhosis.    Other antibiotics: piperacillin/tazobactam 2.25 gm iv q12h    Allergies: Heparin     List concerns for accumulation of vancomycin: renal dysfunction (w/iHD), electrolyte derangement, abnormal LFT's, cirrhosis, BMI ~29, low albumin/malnutrition    Pertinent cultures to date:    17 CDiff PCR (negative)  17 paracentesis fluid WCx x2 (negative to date)  17 BCx x2 (negative)  17 paracentesis fluid WCx x1 (negative)  17 BCx x2 (negative)  17 UCx x1 (negative)  17 buttock WCx x1 (negative)  17 BCx x2 (Escherichia coli)    Recent Labs      177  17   0203   WBC  9.7  10.9*  12.9*   NEUTSPOLYS  90.50*  90.20*  92.20*   BANDSSTABS   --    --   0.90     Recent Labs      17   0317  17   0203   BUN  33*  43*  31*   CREATININE  1.72*  2.28*  1.95*   ALBUMIN  3.0*  3.0*  3.1*     Recent Labs      17   0317   VANCORANDOM  11.7     Intake/Output Summary (Last 24 hours) at 17 1258  Last data filed at 17 0900   Gross per 24 hour   Intake    880 ml   Output   2625 ml   Net  -1745 ml      Blood pressure 146/76, pulse 77, temperature 36.1 °C (96.9 °F), resp. rate 18, height 1.6 m (5' 3\"), weight 71.1 kg (156 lb 12 oz), SpO2 97 %, not currently breastfeeding. Temp (24hrs), Av.2 °C (97.1 " °F), Min:36 °C (96.8 °F), Max:36.4 °C (97.5 °F)    A/P     1. Next vancomycin level: 7/8/17 random with AM labs (ordered)  2. Goal trough: 12-16 mcg/mL  3. Comments: العراقي replaced yesterday. [facility] wants to continue with broad-spectrum antibiotics. Palliative has seen the patient. Plan to check a random level tomorrow and assess the need for red-dosing. The patient has been requiring re-dosing every other day currently. Pharmacy will continue to monitor and adjust dosing as clinically appropriate.    Colette Vasquez, PharmD

## 2017-07-07 NOTE — PROGRESS NOTES
Late entry:  Report received.  Patient A&Ox1 (self only) Pt reports feeling 0/10 pain. Bilateral wrist restraints in place, CMS intact.  Pt denies any needs. Discussed POC for the day with Pt. Call light within reach, encouraged pt to call for assistance.

## 2017-07-07 NOTE — PROGRESS NOTES
rachel from Lab called with critical result of plt of 23 at 0340. Critical lab result read back to rachel sapp notified of critical lab result at 0248  Critical lab result read back by Dr. Angie sapp.

## 2017-07-07 NOTE — PROGRESS NOTES
[facility]val [facility]    Name Abigail Bradley     1935   Age/Sex 82 y.o. female   MRN [id number]   Code Status DNR     After 5PM or if no immediate response to page, please call for cross-coverage  Attending/Team: Dr. Crain/Melchor Use Tiger Text to page  6AM-5PM  Call [phone] to page afterhours   1st Call - Day Intern (R1):   Dr. Barrientos 2nd Call - Day Sr. Resident (R2/R3):   Dr. Bennett         Chief complaint/ reason for interval visit (Primary Diagnosis)   Advanced Cirrhosis, SBP, Acute Kidney Failure requiring H/D    HPI: Patient is alert and awake. She was sitting up in her bed today with no complaints. She denied any pain at this time. She had HD yesterday that took 2.6L. She made 250mL of urine.        Interval Problem Daily Status Update    Thrombocytopenia  -Platelets are increasing, currently 23    Acute Kidney Failure  -received HD yest, 2.6L taken     SBP  -Currently on Zosyn and Vancomycin      Review of Systems   Constitutional: Positive for malaise/fatigue. Negative for fever, chills and weight loss.   HENT: Negative for ear discharge, ear pain, hearing loss and nosebleeds.    Eyes: Negative for blurred vision, double vision, photophobia and discharge.   Respiratory: Negative for cough, hemoptysis, sputum production and stridor.    Cardiovascular: Negative for chest pain, palpitations, claudication and leg swelling.   Gastrointestinal: Negative for heartburn, nausea, vomiting, abdominal pain and blood in stool.   Genitourinary: Negative for dysuria, urgency and frequency.   Musculoskeletal: Negative for myalgias, back pain and neck pain.   Skin: Negative for itching and rash.   Neurological: Positive for dizziness and weakness. Negative for tingling, sensory change, focal weakness, seizures and headaches.   Endo/Heme/Allergies: Bruises/bleeds easily.   Psychiatric/Behavioral: Negative for depression, suicidal ideas and hallucinations. The patient  is not nervous/anxious.        Consultants/Specialty  GI  Nephro  Pallaitive    Disposition  Inpatient with guarded prognosis    Quality Measures  Medications reviewed and Labs reviewed  العراقي Catheter: العراقي placed, replaced on 07/06.  : HD line placed in Right IJ.    DVT Prophylaxis: Contraindicated - High bleeding risk  DVT prophylaxis - mechanical: SCDs    : Zosyn and Vancomycin.            Physical Exam       Filed Vitals:    07/06/17 2353 07/07/17 0331 07/07/17 0800 07/07/17 1200   BP: 138/69 146/84 146/76 133/65   Pulse: 79 79 77 74   Temp: 36.2 °C (97.1 °F) 36.3 °C (97.3 °F) 36.1 °C (96.9 °F) 36.1 °C (96.9 °F)   Resp: 16 18 18 18   Height:       Weight:       SpO2: 94% 93% 97% 93%     Body mass index is 27.77 kg/(m^2). Weight: 71.1 kg (156 lb 12 oz)  Oxygen Therapy:  Pulse Oximetry: 93 %, O2 (LPM): 0, O2 Delivery: None (Room Air)    Physical Exam   Constitutional: No distress.   HENT:   Head: Normocephalic and atraumatic.   Mouth/Throat: Oropharynx is clear and moist.   Eyes: Conjunctivae and EOM are normal. Pupils are equal, round, and reactive to light. Scleral icterus is present.   Neck: Normal range of motion. Neck supple. No JVD present. No tracheal deviation present. No thyromegaly present.   Cardiovascular: Normal rate and regular rhythm.    Pulmonary/Chest: Effort normal and breath sounds normal. No respiratory distress. She has no wheezes. She has no rales.   Abdominal: Soft. Bowel sounds are normal. She exhibits distension. There is no tenderness. There is no guarding.   Musculoskeletal: Normal range of motion. She exhibits no edema or tenderness.   Neurological: She is alert. No cranial nerve deficit.   Skin: No rash noted. She is not diaphoretic. No erythema.   Slight bruising on right and left forearms,    Psychiatric: Mood and affect normal.         Lab Data Review:      7/7/2017  9:25 AM    Recent Labs      07/05/17   0224  07/06/17   0317  07/07/17   0203   SODIUM  137  143  140   POTASSIUM   3.3*  3.5*  3.7   CHLORIDE  104  107  104   CO2  27  24  24   BUN  33*  43*  31*   CREATININE  1.72*  2.28*  1.95*   CALCIUM  8.8  8.9  8.9       Recent Labs      07/05/17 0224 07/06/17 0317 07/07/17   0203   ALTSGPT  45  53*  60*   ASTSGOT  47*  69*  84*   ALKPHOSPHAT  96  137*  130*   TBILIRUBIN  7.5*  7.0*  8.0*   GLUCOSE  103*  107*  82       Recent Labs      07/05/17 0224 07/06/17 0317 07/07/17   0203   RBC  2.43*  2.58*  2.54*   HEMOGLOBIN  8.0*  8.6*  8.4*   HEMATOCRIT  26.0*  27.5*  27.2*   PLATELETCT  17*  21*  23*       Recent Labs      07/05/17 0224 07/06/17 0317 07/07/17   0203   WBC  9.7  10.9*  12.9*   NEUTSPOLYS  90.50*  90.20*  92.20*   LYMPHOCYTES  6.90*  4.50*  2.60*   MONOCYTES  1.70  5.30  3.50   EOSINOPHILS  0.90  0.00  0.80   BASOPHILS  0.00  0.00  0.00   ASTSGOT  47*  69*  84*   ALTSGPT  45  53*  60*   ALKPHOSPHAT  96  137*  130*   TBILIRUBIN  7.5*  7.0*  8.0*           Assessment/Plan     Thrombocytopenia (CMS-[facility]) (present on admission)  Assessment & Plan  Platelets increased 21 to 23 today  Serotonin Assay negative,   Will still continue to hold all heparin due to high bleeding risk, talked to nephrology and they can use their discretion regarding heparin use for dialysis  Will continue to monitor    Sepsis (CMS-[facility]) (present on admission)  Assessment & Plan  Source of infection is likely UTI/SBP  Blood cultures identified E.coli in 2/2 bottles on 06/21  Results from ascites fluid culture negative, morphology showed >250 PMNs  C diff PCR toxin taken on 7/1 are negative  Blood cultures taken on 06/30 were negative   WBC increased today, but overall trend is downwards, will monitor  Continue Zosyn and Vancomycin      Acute kidney injury (CMS-HCC) (present on admission)  Assessment & Plan  Most likely etiology is contrast induced nephropathy. However ATN or hepatorenal syndrome could be another possibility.   FeNa is 0.11% showing pre-renal.   Urine eosonophils are  negative.  Daily urine output around 300-400cc.   Nephro team onboard. Will do 3 times/week HD now, most recent was on 07/06      Urinary tract infection (present on admission)  Assessment & Plan  UA revealed the presence of a UTI.  Zosyn/Vanco for SBP should also cover her UTI.   Urine cultures discarded as they were non-specific  العراقي Catheter changed on 7/06/2017    Hyperbilirubinemia (present on admission)  Assessment & Plan  T. Bili elevated at 10.  50% of the elevation is indirect bilirubin and 50% is direct bilirubin indicating intrahepatic process.  Likely secondary to alcoholic vs viral hepatitis and/or cirrhosis.  Will continue to monitor.  MRCP does not reveal any obstruction.    Alcoholic cirrhosis of liver with ascites (CMS-HCC) (present on admission)  Assessment & Plan  Likely alcohol related from chronic alcohol abuse vs hepatitis C.  Cirrhosis was identified on ultrasound.  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  Underwent a paracentesis with IR on 7/1 which drained 4L  Fluid showed 500 WBC with 86% neutrophils  Culture is negative  Keep current Zosyn/Vanco for SBP.     Hepatitis C antibody test positive (present on admission)  Assessment & Plan  Likely has had exposure in the past.  HCR RNA PCR indicates viral load.  May consider treatment down the line as an outpatient.    Altered mental status  Assessment & Plan  Stable on 07/06   Palliative team will meet with patient when  is present      Alcoholic hepatitis (present on admission)  Assessment & Plan  Patient's liver enzymes elevated but patient's last drink was a month ago.  Could be having alcoholic hepatitis since a month.  INR is elevated and T. Bili is at 10.  Maddrey's discriminant function is 60.6 which indicates steroid use to reduce mortality but it is unclear how much bilirubin is from the hepatitis vs biliary obstruction.  In any case starting steroids is contraindicated as patient  is septic.  On pentoxifylline at renal dosing.  Also on rifaximin and lactulose. Ammonia has been stable since admission.  Gastroenterology following, appreciate recommendations.    Excoriation of buttock (present on admission)  Assessment & Plan  Etiology from being bed bound  Doesn't appear to need antibiotics as it does not look infected  Wound care consult in place

## 2017-07-07 NOTE — DOCUMENTATION QUERY
DOCUMENTATION QUERY    PROVIDERS: Please select “Cosign w/ note”to reply to query.    To better represent the severity of illness of your patient, please review the following information and exercise your independent professional judgment in responding to this query.     Per the medical record there is conflicting wound documentation. Based upon the clinical findings, risk factors, and treatment, can a diagnosis be clarified?    1. Per Progress Notes of 6/29: Decubitus ulcer  2. Per Progress Notes of 6/28: Excoriation of buttock   3. Per Wound Team Notes 6/22: severe IAD, bilateral buttocks and erma area    • Decubitus Ulcer ruled out, Agree with Wound Care RN assessment  • Decubitus Ulcer ruled in, Disagree with Wound Care RN assessment (please document location and staging of decubitus ulcer)  • Other explanation of clinical findings  • Unable to determine    The medical record reflects the following:   Clinical Findings Per Wound Team Notes 6/22:   Wound Type/Location: severe IAD, bilateral buttocks and periarea                  Periwound: red, excoriated, denuded                          Drainage: min serosanguinous    EVALUATION:  Pt presents with significant IAD to bilateral buttocks and erma area.  TYLER and visco patches used to protect and soothe the area    Per Progress Notes 6/28:  Excoriation of buttock  Etiology from being bed bound.  Is erythematous and oozing.  Received one dose of vancomycin and then switched over to doxycycline for a day.  Doesn't appear to need antibiotics as it does not look infected.  Wound care consult in place    Per Progress Notes 6/29:   Decubitus ulcer   Erythematous and oozing blood today; more than normal  Wound care consulted  Air foam mattress ordered    Need close monitoring   Treatment Wound Team Consultation, Tyler cream, Viscopaste, yellow petrolatrum gauze covered with non-adherent pads, العراقي catheter, & rectal tube   Risk Factors Sepsis, UTI, SBP, acute kidney  injury, end-stage liver disease, hepatic encephalopathy, & alcoholic hepatitis   Location within medical record History & Physical, Progress Notes & Wound Team Notes      Thank you,   Kaelyn Ball RN  Clinical   Phone [phone]

## 2017-07-07 NOTE — PROGRESS NOTES
Nephrology Progress Note, Adult, Complex               Author: Deisy Smitha  Date & Time created: 7/7/2017  3:31 PM     Interval History:  82 year old with Hep C, cirrhosis, with BENNETT  -HENRY most likely.  More alert today  C/o fatigue  Low Plt -HIT Ab (+)  HD TTS -off heparin      Review of Systems:  Review of Systems   Constitutional: Positive for malaise/fatigue. Negative for fever and chills.   HENT: Negative for congestion, nosebleeds and sore throat.    Eyes: Negative.    Respiratory: Negative for cough, shortness of breath and wheezing.    Cardiovascular: Negative for chest pain, palpitations and orthopnea.   Gastrointestinal: Negative for nausea, vomiting and abdominal pain.   Genitourinary: Negative for dysuria.   Skin: Negative for itching and rash.   Neurological: Negative for headaches.       Physical Exam:  Physical Exam   Constitutional: She appears well-developed and well-nourished. She appears ill. No distress.   HENT:   Head: Normocephalic and atraumatic.   Mouth/Throat: Oropharynx is clear and moist.   Eyes: Conjunctivae and EOM are normal. Pupils are equal, round, and reactive to light.   Neck: Normal range of motion. Neck supple.   Cardiovascular: Normal rate and regular rhythm.    Pulmonary/Chest: Effort normal and breath sounds normal. No respiratory distress. She has no wheezes. She has no rales.   Abdominal: Soft. She exhibits distension. There is no tenderness.   Musculoskeletal: She exhibits edema. She exhibits no tenderness.   Neurological: She is alert.   Skin: Skin is warm and dry.       Labs:        Invalid input(s): [id number]      Recent Labs      07/05/17 0224 07/06/17 0317 07/07/17   0203   SODIUM  137  143  140   POTASSIUM  3.3*  3.5*  3.7   CHLORIDE  104  107  104   CO2  27  24  24   BUN  33*  43*  31*   CREATININE  1.72*  2.28*  1.95*   CALCIUM  8.8  8.9  8.9     Recent Labs      07/05/17 0224 07/06/17 0317  07/07/17   0203   ALTSGPT  45  53*  60*   ASTSGOT  47*  69*   84*   ALKPHOSPHAT  96  137*  130*   TBILIRUBIN  7.5*  7.0*  8.0*   GLUCOSE  103*  107*  82     Recent Labs      17   0203   RBC  2.43*  2.58*  2.54*   HEMOGLOBIN  8.0*  8.6*  8.4*   HEMATOCRIT  26.0*  27.5*  27.2*   PLATELETCT  17*  21*  23*     Recent Labs      17   0203   WBC  9.7  10.9*  12.9*   NEUTSPOLYS  90.50*  90.20*  92.20*   LYMPHOCYTES  6.90*  4.50*  2.60*   MONOCYTES  1.70  5.30  3.50   EOSINOPHILS  0.90  0.00  0.80   BASOPHILS  0.00  0.00  0.00   ASTSGOT  47*  69*  84*   ALTSGPT  45  53*  60*   ALKPHOSPHAT  96  137*  130*   TBILIRUBIN  7.5*  7.0*  8.0*           Hemodynamics:  Temp (24hrs), Av.2 °C (97.1 °F), Min:36 °C (96.8 °F), Max:36.4 °C (97.5 °F)  Temperature: 36.1 °C (96.9 °F)  Pulse  Av.4  Min: 63  Max: 99   Blood Pressure : 133/65 mmHg     Respiratory:    Respiration: 18, Pulse Oximetry: 93 %        RUL Breath Sounds: Clear, RML Breath Sounds: Clear, RLL Breath Sounds: Diminished, SARAH Breath Sounds: Clear, LLL Breath Sounds: Diminished  Fluids:    Intake/Output Summary (Last 24 hours) at 17 1531  Last data filed at 17 1200   Gross per 24 hour   Intake    800 ml   Output     25 ml   Net    775 ml     Weight: 71.1 kg (156 lb 12 oz)  GI/Nutrition:  Orders Placed This Encounter   Procedures   • DIET ORDER     Standing Status: Standing      Number of Occurrences: 1      Standing Expiration Date:      Order Specific Question:  Diet:     Answer:  2 Gram Sodium [7]      Comments:  Strict 1:1 feeding; crush meds in applesauce/pudding, please encourage PO intake     Order Specific Question:  Texture/Fiber modifications:     Answer:  Dysphagia 2(Pureed/Chopped)specify fluid consistency(question 6) [2]     Order Specific Question:  Consistency/Fluid modifications:     Answer:  Thin Liquids [3]     Order Specific Question:  Miscellaneous modifications:     Answer:  SLP - 1:1 Supervision by Nursing [21]      Medical Decision Making, by Problem:  Active Hospital Problems    Diagnosis   • Upper GI bleed [K92.2]   • Bacteremia due to Escherichia coli [R78.81]   • Sepsis (CMS-HCC) [A41.9]   • Alcoholic cirrhosis of liver with ascites (CMS-HCC) [K70.31]   • Chronic hepatitis C virus infection (CMS-HCC) [B18.2]   • Acute kidney injury (CMS-HCC) [N17.9]   • Urinary tract infection [N39.0]   • Hyperbilirubinemia [E80.6]   • Thrombocytopenia (CMS-HCC) [D69.6]   • Elevated troponin [R74.8]   • Abdominal pain [R10.9]   • Alcoholic hepatitis [K70.10]   • Excoriation of buttock [S30.810A]     1. BENNETT/HD -poor UOP -continue dialysis on TTS schedule  2. Electrolytes:Hyponatremia: normalized, mild hypokalemia -improved  3. Acidosis: corrected  4. Anemia: low Hb level -epogen with HD  Recs;  -HD  - TTS  -monitor BMP  -diet renal  -all meds to renal doses  -Will follow   Labs reviewed and Medications reviewed

## 2017-07-07 NOTE — PROGRESS NOTES
Gastroenterology Progress Note     Author: Agustín Lovell   Date & Time Created: 7/7/2017 2:07 PM    Interval History:  Problem: SBP, end-stage liver disease, hepatitis C, alcohol    This is an 82 year old woman admitted 6/21/2017 with abdominal pain, jaundice, ascites, and renal failure.  She was seen by Dr. 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  She has CT scan evidence of ascites, cirrhosis, splenorenal and gastroesophageal varices.  Her last paracentesis 7/1/1017 was consistent with SBP and she is being treated with vancomycin and Zosyn.    She is a little less confused per her  who is at bedside, otherwise no change.  She doesn't have much of an appetite.  Denies pain, nausea, and vomiting.    Review of Systems:  ROS    Physical Exam:  Physical Exam   Eyes: Scleral icterus is present.   Cardiovascular: Normal rate and regular rhythm.    Pulmonary/Chest: Effort normal and breath sounds normal.   Decreased excursion.   Abdominal: Distention: Marked, not quite tense. There is tenderness (Minimal generalized.). There is no guarding.   Neurological: She is alert.       Labs:        Invalid input(s): [id number]      Recent Labs      07/05/17 0224 07/06/17 0317 07/07/17   0203   SODIUM  137  143  140   POTASSIUM  3.3*  3.5*  3.7   CHLORIDE  104  107  104   CO2  27  24  24   BUN  33*  43*  31*   CREATININE  1.72*  2.28*  1.95*   CALCIUM  8.8  8.9  8.9     Recent Labs      07/05/17 0224 07/06/17 0317 07/07/17   0203   ALTSGPT  45  53*  60*   ASTSGOT  47*  69*  84*   ALKPHOSPHAT  96  137*  130*   TBILIRUBIN  7.5*  7.0*  8.0*   GLUCOSE  103*  107*  82     Recent Labs      07/05/17 0224 07/06/17 0317 07/07/17   0203   RBC  2.43*  2.58*  2.54*   HEMOGLOBIN  8.0*  8.6*  8.4*   HEMATOCRIT  26.0*  27.5*  27.2*   PLATELETCT  17*  21*  23*     Recent Labs      07/05/17 0224 07/06/17 0317 07/07/17   0203   WBC  9.7  10.9*  12.9*   NEUTSPOLYS  90.50*  90.20*   92.20*   LYMPHOCYTES  6.90*  4.50*  2.60*   MONOCYTES  1.70  5.30  3.50   EOSINOPHILS  0.90  0.00  0.80   BASOPHILS  0.00  0.00  0.00   ASTSGOT  47*  69*  84*   ALTSGPT  45  53*  60*   ALKPHOSPHAT  96  137*  130*   TBILIRUBIN  7.5*  7.0*  8.0*     Hemodynamics:  Temp (24hrs), Av.2 °C (97.1 °F), Min:36 °C (96.8 °F), Max:36.4 °C (97.5 °F)  Temperature: 36.1 °C (96.9 °F)  Pulse  Av.4  Min: 63  Max: 99   Blood Pressure : 133/65 mmHg     Respiratory:    Respiration: 18, Pulse Oximetry: 93 %        RUL Breath Sounds: Clear, RML Breath Sounds: Clear, RLL Breath Sounds: Diminished, SARAH Breath Sounds: Clear, LLL Breath Sounds: Diminished  Fluids:    Intake/Output Summary (Last 24 hours) at 17 1407  Last data filed at 17 1200   Gross per 24 hour   Intake   1200 ml   Output   2625 ml   Net  -1425 ml     Weight: 71.1 kg (156 lb 12 oz)  GI/Nutrition:  Orders Placed This Encounter   Procedures   • DIET ORDER     Standing Status: Standing      Number of Occurrences: 1      Standing Expiration Date:      Order Specific Question:  Diet:     Answer:  2 Gram Sodium [7]      Comments:  Strict 1:1 feeding; crush meds in applesauce/pudding, please encourage PO intake     Order Specific Question:  Texture/Fiber modifications:     Answer:  Dysphagia 2(Pureed/Chopped)specify fluid consistency(question 6) [2]     Order Specific Question:  Consistency/Fluid modifications:     Answer:  Thin Liquids [3]     Order Specific Question:  Miscellaneous modifications:     Answer:  SLP - 1:1 Supervision by Nursing [21]     Medical Decision Making, by Problem:  Active Hospital Problems    Diagnosis   • Bacteremia due to Escherichia coli [R78.81]   • Sepsis (CMS-HCC) [A41.9]   • Alcoholic cirrhosis of liver with ascites (CMS-HCC) [K70.31]   • Hepatitis C antibody test positive [R76.8]   • Acute kidney injury (CMS-HCC) [N17.9]   • Urinary tract infection [N39.0]   • Hyperbilirubinemia [E80.6]   • Thrombocytopenia (CMS-HCC) [D69.6]   •  Alcoholic hepatitis [K70.10]   • Excoriation of buttock [S30.810A]   • Altered mental status [R41.82]     Impression:  1. SBP  Slow improvement.  2. E coli bacteremia.  3. Acute kidney disease due to IV contrast +/- HRS, on dialysis.  4. 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  Decompensated.  Poor prognosis.  5. Thrombocytopenia.  6. Mild encephalopathy, on rifaximin and lactulose.    Plan:  Supportive care.  Failure to see any further drop in bilirubin is not a good sign.    Core Measures

## 2017-07-08 NOTE — PROGRESS NOTES
Stool leaking out around BMS.  110mL removed from balloon.  35 mL reinserted as directed by orders.  BMS irrigated with 60mL fluid.  Now draining adequately.

## 2017-07-08 NOTE — PROGRESS NOTES
"\Pharmacy Kinetics 82 y.o. female on vancomycin day # 7 2017    Currently Dose: vancomycin pulse dosing   1900 mg iv once  · 7/3 VR 20.3 mcg/mL   600 mg iv once  ·  VR 11.8 mcg/mL   800 mg iv once  ·  VR 11.7 mcg/mL  · /8 VR 15.0 mcg/mL  Received Load Dose: Yes    Indication for Treatment: intra-abdominal infection (persistent SBP)  ID Service Following: No    Pertinent history per medical record: Admitted on 2017 for cholangitis with concurrent renal failure. Nephrology consulted with plans for continued iHD. GI consulting for melena/cirrhosis.    Other antibiotics: piperacillin/tazobactam 2.25 gm iv q12h    Allergies: Heparin     List concerns for accumulation of vancomycin: renal dysfunction (w/iHD), electrolyte derangement, abnormal LFT's, cirrhosis, BMI ~29, low albumin/malnutrition    Pertinent cultures to date:    17 CDiff PCR (negative)  17 paracentesis fluid WCx x2 (negative to date)  17 BCx x2 (negative)  17 paracentesis fluid WCx x1 (negative)  17 BCx x2 (negative)  17 UCx x1 (negative)  17 buttock WCx x1 (negative)  17 BCx x2 (Escherichia coli)    Recent Labs      17   0203  17   0240   WBC  10.9*  12.9*  12.0*   NEUTSPOLYS  90.20*  92.20*  82.90*   BANDSSTABS   --   0.90   --      Recent Labs      17   0203  17   0240   BUN  43*  31*  40*   CREATININE  2.28*  1.95*  2.45*   ALBUMIN  3.0*  3.1*  2.8*     Recent Labs      177  17   0240   VANCORANDOM  11.7  15.0     Intake/Output Summary (Last 24 hours) at 17 1006  Last data filed at 17 0600   Gross per 24 hour   Intake    360 ml   Output    700 ml   Net   -340 ml      Blood pressure 140/66, pulse 84, temperature 36.2 °C (97.2 °F), resp. rate 22, height 1.6 m (5' 3\"), weight 75.9 kg (167 lb 5.3 oz), SpO2 93 %, not currently breastfeeding. Temp (24hrs), Av.2 °C (97.1 °F), Min:36.1 °C (96.9 °F), Max:36.2 " °C (97.2 °F)    Estimated Creatinine Clearance: 17.9 mL/min (by C-G formula based on Cr of 2.45).    A/P   1. Vancomycin dose change: not indicated   2. Next vancomycin level: 7/11/17   3. Goal trough: 12-16 mcg/mL  4. Comments: VS stable. Afebrile. WBC elevated/stable. ESRD w/iHD TTS per nephrology. GI/nephrology consulting. No new microbiology. Most recent random level at goal and drawn appropriately. Will hold supplemental dose post-iHD and continue to follow. Pharmacy will follow and continue to adjust as appropriate.    Kevin Nathan, PHARMD

## 2017-07-08 NOTE — PROGRESS NOTES
Report received at bedside and assumed care at this time. Patient AxOx1, confused/forgetful. Currently resting in bed. Denies pain. Turn Q2/repositioned with pillow support to promote comfort. Bilateral soft wrist restraints intact, no skin break down noted. Generalized fragile skin/bruising, abrasion JERMAN knees, IAD/sacral wounds noted. Skin tear dressing CDI - left hip. Mireya-care provided PRN. العراقي catheter intact, claribel output noted. Rectal tube patent. Right wrist IV CDI, +patent/blood return, no erythema/swelling noted. IV abx therapy infused as prescribed. Right IJ, triple lumen present, dressing CDI. Heart monitor intact, Sinus Rhythm. Verified call-light within reach, bed alarm intact, and wheels locked. Instructed patient to call for assistance PRN. Reinforcement needed. Hourly rounding performed and will continue to monitor.

## 2017-07-08 NOTE — PROGRESS NOTES
Nephrology/Hemodialysis note    Patient with BENNETT/ATN  Seen and examined during dialysis.  Patient  alert today  VS stable  3 H/ UF 2-3 l  Please see dialysis flow sheet for details

## 2017-07-08 NOTE — PROGRESS NOTES
She tolerated her 3 hrs HD and 2 liter net UF. Treatment ended at 1154. Right IJ is intact. Dressing is clean and dry. no distress. shes alert. Post HD report given to her primary RN.

## 2017-07-09 PROBLEM — L25.8 INCONTINENCE ASSOCIATED DERMATITIS: Status: ACTIVE | Noted: 2017-01-01

## 2017-07-09 PROBLEM — S30.810A EXCORIATION OF BUTTOCK: Status: RESOLVED | Noted: 2017-01-01 | Resolved: 2017-01-01

## 2017-07-09 PROBLEM — L89.90 DECUBITUS ULCER: Status: ACTIVE | Noted: 2017-01-01

## 2017-07-09 PROBLEM — R32 INCONTINENCE ASSOCIATED DERMATITIS: Status: ACTIVE | Noted: 2017-01-01

## 2017-07-09 NOTE — PROGRESS NOTES
Peterson from Lab called with critical result of platelets 30 at 0350. Critical lab result read back to Peterson.   Dr. Jon notified of critical lab result at 0422.  Critical lab result read back by Dr. Jon.

## 2017-07-09 NOTE — PROGRESS NOTES
Nephrology Progress Note, Adult, Complex               Author: Nabila Merinopta Date & Time created: 7/9/2017  3:53 PM     Interval History:  82 year old with Hep C, cirrhosis, with BENNETT  -HENRY most likely.  Awake though confused  C/o fatigue  Low Plt -HIT Ab (+)  HD TTS -off heparin      Review of Systems:  Review of Systems   Constitutional: Positive for malaise/fatigue. Negative for fever and chills.   HENT: Negative for congestion, nosebleeds and sore throat.    Eyes: Negative.    Respiratory: Negative for cough, shortness of breath and wheezing.    Cardiovascular: Negative for chest pain, palpitations and orthopnea.   Gastrointestinal: Negative for nausea, vomiting and abdominal pain.   Genitourinary: Negative for dysuria.   Skin: Negative for itching and rash.   Neurological: Negative for headaches.       Physical Exam:  Physical Exam   Constitutional: She appears well-developed and well-nourished. She appears ill. No distress.   HENT:   Head: Normocephalic and atraumatic.   Mouth/Throat: Oropharynx is clear and moist.   Eyes: Conjunctivae and EOM are normal. Pupils are equal, round, and reactive to light.   Neck: Normal range of motion. Neck supple.   Cardiovascular: Normal rate and regular rhythm.    Pulmonary/Chest: Effort normal and breath sounds normal. No respiratory distress. She has no wheezes. She has no rales.   Abdominal: Soft. She exhibits distension. There is no tenderness.   Musculoskeletal: She exhibits edema. She exhibits no tenderness.   Neurological: She is alert.   Skin: Skin is warm and dry.       Labs:        Invalid input(s): [id number]      Recent Labs      07/07/17   0203  07/08/17   0240  07/09/17   0326   SODIUM  140  138  139   POTASSIUM  3.7  3.9  4.0   CHLORIDE  104  107  106   CO2  24  22  25   BUN  31*  40*  26*   CREATININE  1.95*  2.45*  2.04*   CALCIUM  8.9  9.0  8.9     Recent Labs      07/07/17   0203  07/08/17   0240  07/09/17   0326   ALTSGPT  60*  61*  66*   ASTSGOT  84*   84*  88*   ALKPHOSPHAT  130*  136*  151*   TBILIRUBIN  8.0*  6.8*  6.7*   GLUCOSE  82  101*  100*     Recent Labs      17   0203  17   0240  17   0326   RBC  2.54*  2.31*  2.42*   HEMOGLOBIN  8.4*  7.8*  8.2*   HEMATOCRIT  27.2*  25.1*  25.7*   PLATELETCT  23*  26*  30*     Recent Labs      17   0203  17   0240  17   0326   WBC  12.9*  12.0*  12.8*   NEUTSPOLYS  92.20*  82.90*  88.60*   LYMPHOCYTES  2.60*  6.30*  2.60*   MONOCYTES  3.50  8.60  7.00   EOSINOPHILS  0.80  1.70  0.00   BASOPHILS  0.00  0.20  0.00   ASTSGOT  84*  84*  88*   ALTSGPT  60*  61*  66*   ALKPHOSPHAT  130*  136*  151*   TBILIRUBIN  8.0*  6.8*  6.7*           Hemodynamics:  Temp (24hrs), Av.1 °C (97 °F), Min:35.8 °C (96.5 °F), Max:36.7 °C (98 °F)  Temperature: 35.8 °C (96.5 °F)  Pulse  Av.6  Min: 63  Max: 99   Blood Pressure : 136/69 mmHg     Respiratory:    Respiration: (!) 23 (rn notified), Pulse Oximetry: 96 %        RUL Breath Sounds: Clear, RML Breath Sounds: Clear, RLL Breath Sounds: Diminished, SARAH Breath Sounds: Clear, LLL Breath Sounds: Diminished  Fluids:    Intake/Output Summary (Last 24 hours) at 17 1553  Last data filed at 17 2130   Gross per 24 hour   Intake    120 ml   Output    100 ml   Net     20 ml     Weight: 69.5 kg (153 lb 3.5 oz)  GI/Nutrition:  Orders Placed This Encounter   Procedures   • DIET ORDER     Standing Status: Standing      Number of Occurrences: 1      Standing Expiration Date:      Order Specific Question:  Diet:     Answer:  2 Gram Sodium [7]      Comments:  Strict 1:1 feeding; crush meds in applesauce/pudding, please encourage PO intake     Order Specific Question:  Diet:     Answer:  Renal [8]     Order Specific Question:  Texture/Fiber modifications:     Answer:  Dysphagia 2(Pureed/Chopped)specify fluid consistency(question 6) [2]     Order Specific Question:  Consistency/Fluid modifications:     Answer:  Thin Liquids [3]     Order Specific Question:   Miscellaneous modifications:     Answer:  SLP - 1:1 Supervision by Nursing [21]     Medical Decision Making, by Problem:  Active Hospital Problems    Diagnosis   • Upper GI bleed [K92.2]   • Bacteremia due to Escherichia coli [R78.81]   • Sepsis (CMS-HCC) [A41.9]   • Alcoholic cirrhosis of liver with ascites (CMS-HCC) [K70.31]   • Chronic hepatitis C virus infection (CMS-HCC) [B18.2]   • Acute kidney injury (CMS-HCC) [N17.9]   • Urinary tract infection [N39.0]   • Hyperbilirubinemia [E80.6]   • Thrombocytopenia (CMS-HCC) [D69.6]   • Elevated troponin [R74.8]   • Abdominal pain [R10.9]   • Alcoholic hepatitis [K70.10]   • Excoriation of buttock [S30.810A]     1. BENNETT/HD -poor UOP -continue dialysis on TTS schedule  2. Electrolytes:Hyponatremia: normalized, mild hypokalemia -improved  3. Acidosis: corrected  4. Anemia: Monitor H/H  Recs;  -HD  - TTS  -monitor BMP  -diet renal  -all meds to renal doses  -Will follow   Labs reviewed and Medications reviewed

## 2017-07-09 NOTE — PROGRESS NOTES
"Pharmacy Kinetics 82 y.o. female on vancomycin day # 8 2017    Currently Dose: vancomycin pulse dosing   1900 mg iv once  · 7/3 VR 20.3 mcg/mL   600 mg iv once  ·  VR 11.8 mcg/mL  /6 800 mg iv once  · 6 VR 11.7 mcg/mL  · /8 VR 15.0 mcg/mL  Received Load Dose: Yes    Indication for Treatment: intra-abdominal infection (persistent SBP)  ID Service Following: No    Pertinent history per medical record: Admitted on 2017 for cholangitis with concurrent renal failure. Nephrology consulted with plans for continued iHD. GI consulting for melena/cirrhosis.    Other antibiotics: piperacillin/tazobactam 2.25 gm iv q12h    Allergies: Heparin     List concerns for accumulation of vancomycin: renal dysfunction (w/iHD), electrolyte derangement, abnormal LFT's, cirrhosis, BMI ~29, low albumin/malnutrition    Pertinent cultures to date:    17 CDiff PCR (negative)  17 paracentesis fluid WCx x2 (negative to date)  17 BCx x2 (negative)  17 paracentesis fluid WCx x1 (negative)  17 BCx x2 (negative)  17 UCx x1 (negative)  17 buttock WCx x1 (negative)  17 BCx x2 (Escherichia coli)    Recent Labs      17   0203  17   0240  17   0326   WBC  12.9*  12.0*  12.8*   NEUTSPOLYS  92.20*  82.90*  88.60*   BANDSSTABS  0.90   --   1.80     Recent Labs      17   0203  17   0240  17   0326   BUN  31*  40*  26*   CREATININE  1.95*  2.45*  2.04*   ALBUMIN  3.1*  2.8*  2.8*     Recent Labs      17   0240   VANCORANDOM  15.0     Intake/Output Summary (Last 24 hours) at 17 0901  Last data filed at 17 2130   Gross per 24 hour   Intake    620 ml   Output   2600 ml   Net  -1980 ml      Blood pressure 147/76, pulse 80, temperature 35.9 °C (96.6 °F), resp. rate 20, height 1.6 m (5' 3\"), weight 69.5 kg (153 lb 3.5 oz), SpO2 96 %, not currently breastfeeding. Temp (24hrs), Av.3 °C (97.3 °F), Min:35.9 °C (96.6 °F), Max:36.7 °C (98 °F)    Estimated " Creatinine Clearance: 20.6 mL/min (by C-G formula based on Cr of 2.04).    A/P   1. Vancomycin dose change: not indicated   2. Next vancomycin level: 7/11/17  3. Goal trough: 12-16 mcg/mL  4. Comments: VS stable. Afebrile. WBC elevated/stable. Continued ESRD w/iHD (TTS). No new microbiology. Updated random vancomycin level ordered prior to next planned iHD session. Will consider repeating random vancomycin level earlier if condition worsens during interim. Pharmacy will follow and continue to adjust as appropriate.    Kevin Nathan, PHARMD

## 2017-07-09 NOTE — PROGRESS NOTES
[facility] Medicine Interval Note    Name Abigail Bradley     1935   Age/Sex 82 y.o. female   MRN [id number]   Code Status DNR     After 5PM or if no immediate response to page, please call for cross-coverage  Attending/Team: Dr. Crain/Melchor Use Tiger Text to page  6AM-5PM  Call [phone] to page afterhours   1st Call - Day Intern (R1):   Dr. Barrientos 2nd Call - Day Sr. Resident (R2/R3):   Dr. Bennett         Chief complaint/ reason for interval visit (Primary Diagnosis)   Advanced Cirrhosis, SBP, Acute Kidney Failure H/D    HPI  Patient seen and examined at bedside. She was sleeping this morning and a bit agitated when aroused. She states that she feels 'alright' and does not have any pain currently. She received dialysis yesterday and 2.5L was taken out.        Interval Problem Daily Status Update    Thrombocytopenia  -Platelets are currently increasing    Acute Kidney Failure  -Recieved HD yesterday, TTS schedule, urine output ~75mL for last 24 hours    SBP  -Currently on Zosyn and Vancomycin    Review of Systems   Constitutional: Positive for malaise/fatigue. Negative for fever, chills and weight loss.   HENT: Negative for hearing loss and nosebleeds.    Eyes: Negative for blurred vision, double vision and photophobia.   Respiratory: Negative for cough and sputum production.    Cardiovascular: Negative for chest pain, palpitations and leg swelling.   Gastrointestinal: Negative for nausea, vomiting, abdominal pain and blood in stool.   Genitourinary: Negative for dysuria and hematuria.   Musculoskeletal: Negative for myalgias, back pain and neck pain.   Skin: Negative for itching and rash.   Neurological: Positive for dizziness and weakness. Negative for tingling, sensory change, focal weakness, seizures and headaches.   Endo/Heme/Allergies: Bruises/bleeds easily.   Psychiatric/Behavioral: Negative for depression and suicidal ideas.        Consultants/Specialty  GI  Nephro  Palliative    Disposition  Inpatient with guarded prognosis    Quality Measures  Medications reviewed and Labs reviewed  العراقي Catheter: العراقي placed, replaced on 07/06.  : HD line placed in Right IJ.    DVT Prophylaxis: Contraindicated - High bleeding risk  DVT prophylaxis - mechanical: SCDs    : Zosyn and Vancomycin.            Physical Exam       Filed Vitals:    07/08/17 2325 07/09/17 0430 07/09/17 0756 07/09/17 1149   BP: 135/57 151/67 147/76 143/69   Pulse: 82 83 80 81   Temp: 36.3 °C (97.4 °F) 36.2 °C (97.2 °F) 35.9 °C (96.6 °F) 35.8 °C (96.5 °F)   Resp: 18 18 20 20   Height:       Weight:       SpO2: 93% 92% 96% 93%     Body mass index is 27.15 kg/(m^2). Weight: 69.5 kg (153 lb 3.5 oz)  Oxygen Therapy:  Pulse Oximetry: 93 %, O2 (LPM): 0, O2 Delivery: None (Room Air)    Physical Exam   Constitutional: No distress.   HENT:   Head: Normocephalic and atraumatic.   Eyes: Conjunctivae and EOM are normal. Pupils are equal, round, and reactive to light. Scleral icterus is present.   Neck: Normal range of motion. Neck supple. No JVD present. No tracheal deviation present. No thyromegaly present.   Cardiovascular: Normal rate and regular rhythm.    Pulmonary/Chest: Effort normal and breath sounds normal. No respiratory distress. She has no wheezes. She has no rales.   Abdominal: Soft. Bowel sounds are normal. She exhibits distension. There is no tenderness. There is no guarding.   Musculoskeletal: Normal range of motion. She exhibits no edema or tenderness.   Neurological: She is alert. No cranial nerve deficit.   Skin: No rash noted. She is not diaphoretic. No erythema.   Slight bruising on right and left forearms,    Psychiatric: Mood and affect normal.         Lab Data Review:      7/9/2017  1:42 PM    Recent Labs      07/07/17   0203  07/08/17   0240  07/09/17   0326   SODIUM  140  138  139   POTASSIUM  3.7  3.9  4.0   CHLORIDE  104  107  106   CO2  24  22  25   BUN  31*   40*  26*   CREATININE  1.95*  2.45*  2.04*   CALCIUM  8.9  9.0  8.9       Recent Labs      07/07/17   0203  07/08/17   0240  07/09/17   0326   ALTSGPT  60*  61*  66*   ASTSGOT  84*  84*  88*   ALKPHOSPHAT  130*  136*  151*   TBILIRUBIN  8.0*  6.8*  6.7*   GLUCOSE  82  101*  100*       Recent Labs      07/07/17   0203  07/08/17   0240  07/09/17   0326   RBC  2.54*  2.31*  2.42*   HEMOGLOBIN  8.4*  7.8*  8.2*   HEMATOCRIT  27.2*  25.1*  25.7*   PLATELETCT  23*  26*  30*       Recent Labs      07/07/17   0203  07/08/17   0240 07/09/17   0326   WBC  12.9*  12.0*  12.8*   NEUTSPOLYS  92.20*  82.90*  88.60*   LYMPHOCYTES  2.60*  6.30*  2.60*   MONOCYTES  3.50  8.60  7.00   EOSINOPHILS  0.80  1.70  0.00   BASOPHILS  0.00  0.20  0.00   ASTSGOT  84*  84*  88*   ALTSGPT  60*  61*  66*   ALKPHOSPHAT  130*  136*  151*   TBILIRUBIN  8.0*  6.8*  6.7*           Assessment/Plan     Thrombocytopenia (CMS-[facility]) (present on admission)  Assessment & Plan  Platelets increasing  Serotonin Assay negative  Will still continue to hold all heparin due to high bleeding risk, talked to nephrology and they can use their discretion regarding heparin use for dialysis  Will continue to monitor    Sepsis (CMS-HCC) (present on admission)  Assessment & Plan  Source of infection is likely UTI/SBP  Blood cultures identified E.coli in 2/2 bottles on 06/21  Results from ascites fluid culture negative, morphology showed >250 PMNs  C diff PCR toxin taken on 7/1 are negative  Blood cultures taken on 06/30 were negative   WBC increased today, but overall trend is downwards, will monitor  Continue Zosyn and Vancomycin, started on 07/02, will continue course for 10-14 days      Acute kidney injury (CMS-HCC) (present on admission)  Assessment & Plan  Most likely etiology is contrast induced nephropathy. However ATN or hepatorenal syndrome could be another possibility.   FeNa is 0.11% showing pre-renal.   Urine eosonophils are negative.  Daily urine output  around 300-400cc.   Nephro team onboard. Will do 3 times/week HD now, TTS schedule      Urinary tract infection (present on admission)  Assessment & Plan  UA revealed the presence of a UTI.  Zosyn/Vanco for SBP should also cover her UTI.   Urine cultures discarded as they were non-specific  العراقي Catheter changed on 7/06/2017    Hyperbilirubinemia (present on admission)  Assessment & Plan  T. Bili elevated at 10.  50% of the elevation is indirect bilirubin and 50% is direct bilirubin indicating intrahepatic process.  Likely secondary to alcoholic vs viral hepatitis and/or cirrhosis.  Will continue to monitor.  MRCP does not reveal any obstruction.    Alcoholic cirrhosis of liver with ascites (CMS-HCC) (present on admission)  Assessment & Plan  Likely alcohol related from chronic alcohol abuse vs hepatitis C.  Cirrhosis was identified on ultrasound.  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  Underwent a paracentesis with IR on 7/1 which drained 4L  Fluid showed 500 WBC with 86% neutrophils  Culture is negative  Keep current Zosyn/Vanco for SBP.     Hepatitis C antibody test positive (present on admission)  Assessment & Plan  Likely has had exposure in the past.  HCR RNA PCR indicates viral load.  May consider treatment down the line as an outpatient.    Altered mental status  Assessment & Plan  Currently Stable   Palliative team will meet with patient when  is present      Incontinence associated dermatitis  Assessment & Plan  Etiology from being bed bound  Doesn't appear to need antibiotics as it does not look infected  Wound care consult in place    Alcoholic hepatitis (present on admission)  Assessment & Plan  Patient's liver enzymes elevated but patient's last drink was a month ago.  Could be having alcoholic hepatitis since a month.  INR is elevated and T. Bili is at 10.  Maddrey's discriminant function is 60.6 which indicates steroid use to reduce mortality  but it is unclear how much bilirubin is from the hepatitis vs biliary obstruction.  In any case starting steroids is contraindicated as patient is septic.  On pentoxifylline at renal dosing.  Also on rifaximin and lactulose. Ammonia has been stable since admission.  Gastroenterology following, appreciate recommendations.

## 2017-07-09 NOTE — PROGRESS NOTES
Pt continually kicks off float boots despite being educated that her heels are red and boggy.  Float boots reapplied often.
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
## 2017-07-09 NOTE — PROGRESS NOTES
Received report from previous nurse regarding prior 12 hours.  POC reviewed with pt, white board updated, pt verbalized understanding, call light within reach.  Morning medications held until after dialysis.

## 2017-07-09 NOTE — PROGRESS NOTES
"Report received at bedside and assumed care at this time. Patient AxOx1, confused and restless. States she \"wants to go home to her \". Plan of care discussed. Will need frequent reinforcement. JERMAN soft wrist restraints remain intact, no skin breakdown noted. Will continue to monitor. Patient denies pain. Right wrist IV CDI, +patent/blood return, no erythema/swelling noted. IV abx therapy continued. Heart monitor intact, sinus rhythm with PVCs. Abdomen distended with generalized edema noted. Paracentesis/skin tear dressings intact - minimal serosang drainage noted. Patient turn Q2 and repositioned with pillow support. Right IJ dressing CDI. Verified call-light within reach, bed alarm set and wheels locked. Instructed patient to call for assistance PRN. Reinforcement needed. Hourly rounding performed and will continue to monitor.  "

## 2017-07-09 NOTE — PROGRESS NOTES
Received report from previous nurse regarding prior 12 hours.  POC reviewed with pt, white board updated, pt verbalized understanding, call light within reach.

## 2017-07-10 NOTE — PROGRESS NOTES
Internal Medicine Interval Note    Name Abigail Bradley     1935   Age/Sex 82 y.o. female   MRN [id number]   Code Status DNR     After 5PM or if no immediate response to page, please call for cross-coverage  Attending/Team: Breann/Melchor See Patient List for primary contact information  Call [phone] to page after hours   1st Call - Day Intern (R1):   Nico 2nd Call - Day Sr. Resident (R2/R3):   Bennett         Reason for interval visit  (Principal Problem)   Sepsis    HPI  Pt is no longer agitated but remains confused. Pt states that she is comfortable. She appears somewhat emotionally distraught apologizing for her confusion. Pt denied any other concerns or complaints.    Interval Problem Daily Status Update  (24 hours)   Sepsis  -Currently on Zosyn and Vancomycin    Thrombocytopenia  -Platelets trending upward    Acute Kidney Failure  -Nephro following, TTS schedule for HD, still producing urine    Cirrhosis  -GI following, recommend goals of care discussion    Review of Systems   Constitutional: Positive for malaise/fatigue. Negative for fever and chills.   HENT: Negative for congestion and sore throat.    Eyes: Negative for pain, discharge and redness.        Icterus   Respiratory: Negative for cough, hemoptysis and wheezing.    Cardiovascular: Positive for leg swelling. Negative for chest pain and palpitations.   Gastrointestinal: Positive for diarrhea. Negative for nausea, vomiting, abdominal pain, constipation, blood in stool and melena.   Genitourinary: Negative for hematuria and flank pain.   Musculoskeletal: Negative for myalgias, back pain and neck pain.   Skin: Negative for itching and rash.   Neurological: Positive for dizziness, tingling, tremors and weakness. Negative for sensory change, speech change, focal weakness and headaches.   Endo/Heme/Allergies: Bruises/bleeds easily.       Consultants/Specialty  Nephro  GI  Palliative    Disposition  Inpatient    Quality Measures  Labs  reviewed and Medications reviewed  العراقي catheter: Critically Ill - Requiring Accurate Measurement of Urinary Output  Central line in place: Dialysis    DVT Prophylaxis: Contraindicated - High bleeding risk  DVT prophylaxis - mechanical: SCDs    Antibiotics: Treating active infection/contamination beyond 24 hours perioperative coverage          Physical Exam       Filed Vitals:    07/10/17 0005 07/10/17 0356 07/10/17 0748 07/10/17 1228   BP: 142/54 129/69 139/73 144/79   Pulse: 82 81 87 83   Temp: 36.6 °C (97.8 °F) 36.6 °C (97.9 °F) 36.2 °C (97.1 °F) 36.2 °C (97.1 °F)   Resp: 16 14 16 16   Height:       Weight:       SpO2: 96% 98% 95% 92%     Body mass index is 28.01 kg/(m^2). Weight: 71.7 kg (158 lb 1.1 oz)  Oxygen Therapy:  Pulse Oximetry: 92 %, O2 (LPM): 0, O2 Delivery: None (Room Air)    Physical Exam   HENT:   Head: Normocephalic and atraumatic.   Eyes: EOM are normal. Pupils are equal, round, and reactive to light. Right eye exhibits no discharge. Left eye exhibits no discharge. Scleral icterus is present.   Neck: No JVD present. No tracheal deviation present.   Cardiovascular: Normal rate and regular rhythm.  Exam reveals no gallop and no friction rub.    No murmur heard.  Pulmonary/Chest: Breath sounds normal. She has no wheezes. She has no rales.   Abdominal: She exhibits distension. There is no tenderness. There is no rebound and no guarding.   Musculoskeletal: She exhibits edema. She exhibits no tenderness.   Lymphadenopathy:     She has no cervical adenopathy.   Neurological: She is alert. GCS score is 15.   Skin:   Jaundice         Lab Data Review:         7/10/2017  1:32 PM    Recent Labs      07/08/17   0240  07/09/17 0326  07/10/17   0312   SODIUM  138  139  139   POTASSIUM  3.9  4.0  3.9   CHLORIDE  107  106  106   CO2  22  25  25   BUN  40*  26*  36*   CREATININE  2.45*  2.04*  2.34*   PHOSPHORUS   --    --   3.5   CALCIUM  9.0  8.9  8.9       Recent Labs      07/08/17   0240 07/09/17 0326   07/10/17   0312   ALTSGPT  61*  66*   --    ASTSGOT  84*  88*   --    ALKPHOSPHAT  136*  151*   --    TBILIRUBIN  6.8*  6.7*   --    GLUCOSE  101*  100*  90       Recent Labs      07/08/17   0240  07/09/17   0326   RBC  2.31*  2.42*   HEMOGLOBIN  7.8*  8.2*   HEMATOCRIT  25.1*  25.7*   PLATELETCT  26*  30*       Recent Labs      07/08/17   0240  07/09/17   0326   WBC  12.0*  12.8*   NEUTSPOLYS  82.90*  88.60*   LYMPHOCYTES  6.30*  2.60*   MONOCYTES  8.60  7.00   EOSINOPHILS  1.70  0.00   BASOPHILS  0.20  0.00   ASTSGOT  84*  88*   ALTSGPT  61*  66*   ALKPHOSPHAT  136*  151*   TBILIRUBIN  6.8*  6.7*           Assessment/Plan       Acute kidney injury (CMS-HCC) (present on admission)  -Likely 2/2 to HENRY. However ATN or hepatorenal syndrome could be another possibility.    -FeNa is 0.11% showing pre-renal.    -Urine eosonophils are negative.  -Daily urine 100-200 mL  -Electrolytes stable wnl  -Nephro following, HD on TTS schedule    Sepsis (CMS-HCC) (present on admission)  -2/2 to UTI vs SBP  -Blood cultures identified E.coli in 2/2 bottles on 06/21  -Results from ascites fluid culture negative, morphology showed >250 PMNs  -UA suggestive of UTI but urine cultures nonspecific  -C diff PCR toxin taken on 7/1 are negative  -Blood cultures taken on 06/30 were negative    -WBC stable ~12 as of yesterday  -Will continue Zosyn and Vancomycin, should cover both SBP and UTI    Thrombocytopenia (CMS-HCC) (present on admission)  -Platelets continue to increase  -Serotonin Assay negative,   -Continuing to hold heparin due to high bleeding risk  -Will continue to monitor via CBC     Alcoholic cirrhosis of liver with ascites (CMS-HCC) (present on admission)  -2/2  alcohol related from chronic alcohol abuse vs HCV  -Pt denies drinking for ~1 month prior to admission  -Identified with US with signs of portal HTN on CT  -HCV confirmed via RNA PCR  -Child-juarez's class C and has a MELD score of 26 indicating high mortality.  -LFT's and  T. Bili elevated as of yesterday. Albumin continues to be low.   -Paracentesis on 7/1 which drained 4L and showed 500 WBC with 86% neutrophils, culture is negative however  -Continue Zosyn/Vanco for SBP.   -Continue Trental for alcoholic hepatitis   -GI following, recommend discussion with palliative concerning goals of care as pts condition is guarded     Excoriation of buttock (present on admission)  -2/2 from bed bound status  -Wound care following

## 2017-07-10 NOTE — PROGRESS NOTES
Received report from previous nurse regarding prior 12 hours.  POC reviewed with pt, white board updated, pt verbalized understanding, call light within reach.  [facility] orange, nephrology, and GI rounded on pt, updated RN on POC, palliative consult today ideally, RN will attempt to reach  Kelby.

## 2017-07-10 NOTE — PALLIATIVE CARE
Palliative Care follow-up  Message received from ADRIANA CRESPO Sarah inquiring about having a f/u family meeting with the patient and her  Kelby. A call was placed to Kelby to arrange a time and he suggested Tuesday at noon, which PC agreed to.     Updated: BS RN     Plan: family meeting to discuss POC Tuesday at noon    Thank you for allowing Palliative Care to participate in this patient's care. Please feel free to call [phone] with any questions or concerns.

## 2017-07-10 NOTE — PROGRESS NOTES
RN contacted palliative, left voicemail regarding setting up a time for IDT, , and pt conference regarding pt's POC. Awaiting callback.
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
## 2017-07-10 NOTE — PROGRESS NOTES
Report received at bedside and assumed care at this time. Patient AxOx2, confused. Currently resting in bed. Denies pain. Turn Q2 and repositioned with pillow support to promote comfort. Primo boots intact. العراقي catheter and rectal tube patent. Bilateral soft wrist restraints continued/assessed per protocol. Patient provided apple sauce and orange juice with bedtime medications. Heart monitor intact, sinus rhythm. Right wrist IV CDI, +patent/blood return, no erythema/swelling noted. IV abx therapy continued as prescribed. Right IJ dressing CDI. Wound and erma-care provided PRN. Abdominal and skin tear dressing CDI at this time. Will continue to monitor. Verified call-light within reach, bed alarm set and wheels locked. Instructed patient to call for assistance PRN. Hourly rounding performed and will continue to monitor.

## 2017-07-10 NOTE — PROGRESS NOTES
Nephrology Progress Note, Adult, Complex               Author: Fadi Najjar Date & Time created: 7/10/2017  1:27 PM     Interval History:  82 year old with Hep C, cirrhosis, with BENNETT  -HENRY most likely.  Awake though confused  C/o fatigue  Low Plt -HIT Ab (+)  HD TTS -off heparin      Review of Systems:  Review of Systems   Constitutional: Positive for malaise/fatigue. Negative for fever and chills.   HENT: Negative for congestion, nosebleeds and sore throat.    Respiratory: Negative for cough, shortness of breath and wheezing.    Cardiovascular: Negative for chest pain, palpitations and orthopnea.   Gastrointestinal: Negative for nausea, vomiting and abdominal pain.   Genitourinary: Negative for dysuria.   Skin: Negative for itching and rash.   Neurological: Negative for headaches.       Physical Exam:  Physical Exam   Constitutional: She appears well-developed and well-nourished. She appears ill. No distress.   HENT:   Head: Normocephalic and atraumatic.   Mouth/Throat: Oropharynx is clear and moist.   Eyes: Conjunctivae and EOM are normal. Pupils are equal, round, and reactive to light.   Neck: Normal range of motion. Neck supple.   Cardiovascular: Normal rate and regular rhythm.    Pulmonary/Chest: Effort normal and breath sounds normal. No respiratory distress. She has no wheezes. She has no rales.   Abdominal: Soft. She exhibits distension. There is no tenderness.   Musculoskeletal: She exhibits edema. She exhibits no tenderness.   Neurological: She is alert.   Skin: Skin is warm and dry.       Labs:        Invalid input(s): [id number]      Recent Labs      07/08/17   0240  07/09/17   0326  07/10/17   0312   SODIUM  138  139  139   POTASSIUM  3.9  4.0  3.9   CHLORIDE  107  106  106   CO2  22  25  25   BUN  40*  26*  36*   CREATININE  2.45*  2.04*  2.34*   PHOSPHORUS   --    --   3.5   CALCIUM  9.0  8.9  8.9     Recent Labs      07/08/17 0240 07/09/17 0326  07/10/17   0312   ALTSGPT  61*  66*   --     ASTSGOT  84*  88*   --    ALKPHOSPHAT  136*  151*   --    TBILIRUBIN  6.8*  6.7*   --    GLUCOSE  101*  100*  90     Recent Labs      17   0240  17   0326   RBC  2.31*  2.42*   HEMOGLOBIN  7.8*  8.2*   HEMATOCRIT  25.1*  25.7*   PLATELETCT  26*  30*     Recent Labs      17   0240  17   0326   WBC  12.0*  12.8*   NEUTSPOLYS  82.90*  88.60*   LYMPHOCYTES  6.30*  2.60*   MONOCYTES  8.60  7.00   EOSINOPHILS  1.70  0.00   BASOPHILS  0.20  0.00   ASTSGOT  84*  88*   ALTSGPT  61*  66*   ALKPHOSPHAT  136*  151*   TBILIRUBIN  6.8*  6.7*           Hemodynamics:  Temp (24hrs), Av.3 °C (97.4 °F), Min:35.8 °C (96.5 °F), Max:36.6 °C (97.9 °F)  Temperature: 36.2 °C (97.1 °F)  Pulse  Av.7  Min: 63  Max: 99   Blood Pressure : 139/73 mmHg     Respiratory:    Respiration: 16, Pulse Oximetry: 95 %        RUL Breath Sounds: Clear, RML Breath Sounds: Diminished, RLL Breath Sounds: Diminished, SARAH Breath Sounds: Clear, LLL Breath Sounds: Diminished  Fluids:    Intake/Output Summary (Last 24 hours) at 07/10/17 1327  Last data filed at 07/10/17 0601   Gross per 24 hour   Intake    160 ml   Output    175 ml   Net    -15 ml     Weight: 71.7 kg (158 lb 1.1 oz)  GI/Nutrition:  Orders Placed This Encounter   Procedures   • DIET ORDER     Standing Status: Standing      Number of Occurrences: 1      Standing Expiration Date:      Order Specific Question:  Diet:     Answer:  2 Gram Sodium [7]      Comments:  Strict 1:1 feeding; crush meds in applesauce/pudding, please encourage PO intake     Order Specific Question:  Diet:     Answer:  Renal [8]     Order Specific Question:  Texture/Fiber modifications:     Answer:  Dysphagia 2(Pureed/Chopped)specify fluid consistency(question 6) [2]     Order Specific Question:  Consistency/Fluid modifications:     Answer:  Thin Liquids [3]     Order Specific Question:  Miscellaneous modifications:     Answer:  SLP - 1:1 Supervision by Nursing [21]     Medical Decision Making, by  Problem:  Active Hospital Problems    Diagnosis   • Upper GI bleed [K92.2]   • Bacteremia due to Escherichia coli [R78.81]   • Sepsis (CMS-HCC) [A41.9]   • Alcoholic cirrhosis of liver with ascites (CMS-HCC) [K70.31]   • Chronic hepatitis C virus infection (CMS-HCC) [B18.2]   • Acute kidney injury (CMS-HCC) [N17.9]   • Urinary tract infection [N39.0]   • Hyperbilirubinemia [E80.6]   • Thrombocytopenia (CMS-HCC) [D69.6]   • Elevated troponin [R74.8]   • Abdominal pain [R10.9]   • Alcoholic hepatitis [K70.10]   • Excoriation of buttock [S30.810A]     1. BENNETT requiring HD -poor UOP -cr continues to increase, continue dialysis on TTS schedule  2. Electrolytes:Hyponatremia: normalized, mild hypokalemia -improved  3. Acidosis: corrected  4. Anemia: Monitor H/H  Recs;  -HD  - TTS  -monitor BMP  -diet renal  -all meds to renal doses  -Will follow   Labs reviewed and Medications reviewed

## 2017-07-10 NOTE — PROGRESS NOTES
"Pharmacy Kinetics 82 y.o. female on vancomycin day # 9 7/10/2017    Currently Dose: vancomycin pulse dosing   1900 mg iv once  1. 73 VR 20.3 mcg/mL   600 mg iv once  ·  VR 11.8 mcg/mL   800 mg iv once  ·  VR 11.7 mcg/mL  · / VR 15.0 mcg/mL  · 7/10 VR 10.8  7/10 600 mg iv once    Indication for Treatment: intra-abdominal infection (persistent SBP)  ID Service Following: No    Pertinent history per medical record: Admitted on 2017 for cholangitis with concurrent renal failure. Nephrology consulted with plans for continued iHD. GI consulting for melena/cirrhosis.    Other antibiotics: piperacillin/tazobactam 2.25 gm iv q12h    Allergies: Heparin     List concerns for accumulation of vancomycin: renal dysfunction (w/iHD), electrolyte derangement, abnormal LFT's, cirrhosis, BMI ~29, low albumin/malnutrition    Pertinent cultures to date:    17 CDiff PCR (negative)  17 paracentesis fluid WCx x2 (negative to date)  17 BCx x2 (negative)  17 paracentesis fluid WCx x1 (negative)  17 BCx x2 (negative)  17 UCx x1 (negative)  17 buttock WCx x1 (negative)  17 BCx x2 (Escherichia coli)    Recent Labs      17   0240  17   0326   WBC  12.0*  12.8*   NEUTSPOLYS  82.90*  88.60*   BANDSSTABS   --   1.80     Recent Labs      17   0240  17   0326  07/10/17   0312   BUN  40*  26*  36*   CREATININE  2.45*  2.04*  2.34*   ALBUMIN  2.8*  2.8*  2.7*     Recent Labs      17   0240  07/10/17   0312   VANCORANDOM  15.0  10.8     Intake/Output Summary (Last 24 hours) at 07/10/17 1126  Last data filed at 07/10/17 0601   Gross per 24 hour   Intake    160 ml   Output    175 ml   Net    -15 ml      Blood pressure 139/73, pulse 87, temperature 36.2 °C (97.1 °F), resp. rate 16, height 1.6 m (5' 3\"), weight 71.7 kg (158 lb 1.1 oz), SpO2 95 %, not currently breastfeeding. Temp (24hrs), Av.2 °C (97.2 °F), Min:35.8 °C (96.5 °F), Max:36.6 °C (97.9 " °F)      A/P   2. Next vancomycin level: 7/12/17 random with AM labs (ordered)  3. Goal trough: 12-16 mcg/mL  4. Comments: VS stable. Afebrile. WBC elevated/stable. Continued ESRD w/iHD (TTS). No new microbiology. Updated random vancomycin level ordered prior to next planned iHD session. Level =10.8 today. I will give the patient another small dose and check a level in 2 days. Consider ID consult. Pharmacy will follow and continue to adjust as appropriate.    Colette Vasquez, PharmD

## 2017-07-10 NOTE — DIETARY
"Nutrition: Day 19 of admit.  83 yo female admitted with ascending cholangitis and BENNETT.  Alcoholic cirrhosis/hepatitis with ascites. Consult received for poor nutritional intake.     Assessment:  1)  Weight today 71.7 kg is increased 3.8 kg since admit scale weight of 67.9 kg. Pt with ascites having paracentesis.   2) HD catheter placed yesterday this admit and pt has been receiving HD.  3) dysphagia 2 (pureed) thin liquid diet with declining intake over the past few days - 0-25% of meals.  Pt was taking % of meals one week ago.   4) MD notes \"guarded long term prognosis given lack of significant improvement.\"  5) palliative care consult scheduled    Recommendations/Plan:  1) encourage intake of meals/supplements. If pt is unable take adequately consume oral diet consider tube feeding if it is in line with POC.  4) document intake of all meals and supplements as % taken in ADL's to provide interdisciplinary communication across all shifts   5) monitor daily weights for adequacy of nutrition and fluid balance    Discussed with RN  "

## 2017-07-10 NOTE — PROGRESS NOTES
"  Gastroenterology Progress Note     Author: Juancarlos Osborne MD   Date & Time Created: 7/10/2017 10:39 AM    Interval History:  Problem: SBP, end-stage liver disease, hepatitis C, alcohol    This is an 82 year old woman admitted 6/21/2017 with abdominal pain, jaundice, ascites, and renal failure.  She was seen by Dr. 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  She has CT scan evidence of ascites, cirrhosis, splenorenal and gastroesophageal varices.  Her last paracentesis 7/1/1017 was consistent with SBP and she is being treated with vancomycin and Zosyn.    No new complaints.    Review of Systems:  Review of Systems   Unable to perform ROS: mental acuity       Physical Exam:  Physical Exam   Constitutional:   Chronically ill appearing woman.   Eyes: Scleral icterus is present.   Cardiovascular: Normal rate and regular rhythm.    Murmur heard.  Pulmonary/Chest: Effort normal and breath sounds normal. She has no wheezes. She has no rales.   Abdominal: Soft. Bowel sounds are normal. She exhibits distension (Moderate with central tympany and flank dullness.). She exhibits no mass. There is no tenderness.   Neurological: She is alert.   Confused - \"He left me twice and has now abandoned me\" speaking of  who has been visiting daily.       Labs:        Invalid input(s): [id number]      Recent Labs      07/08/17   0240  07/09/17   0326  07/10/17   0312   SODIUM  138  139  139   POTASSIUM  3.9  4.0  3.9   CHLORIDE  107  106  106   CO2  22  25  25   BUN  40*  26*  36*   CREATININE  2.45*  2.04*  2.34*   PHOSPHORUS   --    --   3.5   CALCIUM  9.0  8.9  8.9     Recent Labs      07/08/17   0240  07/09/17   0326  07/10/17   0312   ALTSGPT  61*  66*   --    ASTSGOT  84*  88*   --    ALKPHOSPHAT  136*  151*   --    TBILIRUBIN  6.8*  6.7*   --    GLUCOSE  101*  100*  90     Recent Labs      07/08/17 0240 07/09/17 0326   RBC  2.31*  2.42*   HEMOGLOBIN  7.8*  8.2*   HEMATOCRIT  25.1*  25.7* "   PLATELETCT  26*  30*     Recent Labs      17   0240  17   0326   WBC  12.0*  12.8*   NEUTSPOLYS  82.90*  88.60*   LYMPHOCYTES  6.30*  2.60*   MONOCYTES  8.60  7.00   EOSINOPHILS  1.70  0.00   BASOPHILS  0.20  0.00   ASTSGOT  84*  88*   ALTSGPT  61*  66*   ALKPHOSPHAT  136*  151*   TBILIRUBIN  6.8*  6.7*     Hemodynamics:  Temp (24hrs), Av.2 °C (97.2 °F), Min:35.8 °C (96.5 °F), Max:36.6 °C (97.9 °F)  Temperature: 36.2 °C (97.1 °F)  Pulse  Av.7  Min: 63  Max: 99   Blood Pressure : 139/73 mmHg     Respiratory:    Respiration: 16, Pulse Oximetry: 95 %        RUL Breath Sounds: Clear, RML Breath Sounds: Diminished, RLL Breath Sounds: Diminished, SARAH Breath Sounds: Clear, LLL Breath Sounds: Diminished  Fluids:    Intake/Output Summary (Last 24 hours) at 17 1058  Last data filed at 17 2130   Gross per 24 hour   Intake    620 ml   Output   2600 ml   Net  -1980 ml     Weight: 71.7 kg (158 lb 1.1 oz)  GI/Nutrition:  Orders Placed This Encounter   Procedures   • DIET ORDER     Standing Status: Standing      Number of Occurrences: 1      Standing Expiration Date:      Order Specific Question:  Diet:     Answer:  2 Gram Sodium [7]      Comments:  Strict 1:1 feeding; crush meds in applesauce/pudding, please encourage PO intake     Order Specific Question:  Diet:     Answer:  Renal [8]     Order Specific Question:  Texture/Fiber modifications:     Answer:  Dysphagia 2(Pureed/Chopped)specify fluid consistency(question 6) [2]     Order Specific Question:  Consistency/Fluid modifications:     Answer:  Thin Liquids [3]     Order Specific Question:  Miscellaneous modifications:     Answer:  SLP - 1:1 Supervision by Nursing [21]     Medical Decision Making, by Problem:  Active Hospital Problems    Diagnosis   • Sepsis (CMS-HCC) [A41.9]   • Thrombocytopenia (CMS-HCC) [D69.6]   • Bacteremia due to Escherichia coli [R78.81]   • Hepatitis C antibody test positive [R76.8]   • Acute kidney injury (CMS-HCC)  [N17.9]   • Urinary tract infection [N39.0]   • Hyperbilirubinemia [E80.6]   • Alcoholic cirrhosis of liver with ascites (CMS-HCC) [K70.31]   • Alcoholic hepatitis [K70.10]   • Decubitus ulcer [L89.90]   • Incontinence associated dermatitis [L30.8, R32]   • Altered mental status [R41.82]     Impression:  1. SBP  Slow improvement.  Still has low grade leukocytosis, but abdominal tenderness is diminished. Distention still exists.  2. E coli bacteremia.  3. Acute kidney disease due to IV contrast +/- HRS, on dialysis.  4. 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  Decompensated.  Poor prognosis.  5. Thrombocytopenia.  6. Mild encephalopathy, on rifaximin and lactulose.    Plan:  1. Supportive care.  2. Guarded long term prognosis given lack of significant improvement. Goals of care discussion    Labs reviewed

## 2017-07-11 NOTE — DISCHARGE PLANNING
SW spoke to Palliative RN, who states that pt's family is leaning toward hospice. SW will continue to follow and remains available as needs arise.

## 2017-07-11 NOTE — PROGRESS NOTES
from Lab called with critical result of platelets of 39 at 0455. Critical lab result read back to .   This result is getting better, no need to call physician as they are aware of low platelet count.

## 2017-07-11 NOTE — PROGRESS NOTES
Completed 3:00 hour HD treatment. Pt was confused but alert during treatment.   was present during dialysis. UF turned off at 1230 due to hypotension (84/48 mmHg) and c/o right thigh cramping. Symptoms of leg cramping resolved with 200 cc rinse back at completion of treatment. Vital signs stable post treatment.  IJ intact, patent and locked with ACD solution, 1.5 ml to each port.  Red caps intact.  Pt will attempt to pull  IJ, pt currently in soft wrist restraints.  Report given to floor nurse, Magalis Flores RN.

## 2017-07-11 NOTE — DISCHARGE PLANNING
Spoke to bedside nurse. Pt's  is gone for the day and will speak to SW tomorrow morning to review Hospice Choice form.

## 2017-07-11 NOTE — PROGRESS NOTES
Nephrology Progress Note, Adult, Complex               Author: Fadi Najjar Date & Time created: 7/11/2017  3:27 PM     Interval History:  82 year old with Hep C, cirrhosis, with BENNETT  -HENRY most likely.  Awake though confused  C/o fatigue  Low Plt -HIT Ab (+)  HD TTS -off heparin  Seen and examined while getting HD.    Review of Systems:  Review of Systems   Constitutional: Positive for malaise/fatigue. Negative for fever and chills.   HENT: Negative for congestion, nosebleeds and sore throat.    Respiratory: Negative for cough, shortness of breath and wheezing.    Cardiovascular: Negative for chest pain, palpitations and orthopnea.   Gastrointestinal: Negative for nausea, vomiting and abdominal pain.   Genitourinary: Negative for dysuria.   Skin: Negative for itching and rash.   Neurological: Negative for headaches.       Physical Exam:  Physical Exam   Constitutional: She appears well-developed and well-nourished. She appears ill. No distress.   HENT:   Head: Normocephalic and atraumatic.   Mouth/Throat: Oropharynx is clear and moist.   Eyes: Conjunctivae and EOM are normal. Pupils are equal, round, and reactive to light.   Neck: Normal range of motion. Neck supple.   Cardiovascular: Normal rate and regular rhythm.    Pulmonary/Chest: Effort normal and breath sounds normal. No respiratory distress. She has no wheezes. She has no rales.   Abdominal: Soft. She exhibits distension. There is no tenderness.   Musculoskeletal: She exhibits edema. She exhibits no tenderness.   Neurological: She is alert.   Skin: Skin is warm and dry.       Labs:        Invalid input(s): [id number]      Recent Labs      07/09/17   0326  07/10/17   0312  07/11/17   0321   SODIUM  139  139  140   POTASSIUM  4.0  3.9  3.8   CHLORIDE  106  106  106   CO2  25  25  23   BUN  26*  36*  45*   CREATININE  2.04*  2.34*  2.85*   PHOSPHORUS   --   3.5   --    CALCIUM  8.9  8.9  9.3     Recent Labs      07/09/17   0326  07/10/17   0312  07/11/17    0321   ALTSGPT  66*   --   70*   ASTSGOT  88*   --   93*   ALKPHOSPHAT  151*   --   172*   TBILIRUBIN  6.7*   --   6.3*   GLUCOSE  100*  90  104*     Recent Labs      17   0326  17   0321   RBC  2.42*  2.39*   HEMOGLOBIN  8.2*  8.0*   HEMATOCRIT  25.7*  25.6*   PLATELETCT  30*  39*     Recent Labs      17   0326  17   032   WBC  12.8*  13.5*   NEUTSPOLYS  88.60*  83.40*   LYMPHOCYTES  2.60*  5.30*   MONOCYTES  7.00  8.90   EOSINOPHILS  0.00  1.60   BASOPHILS  0.00  0.10   ASTSGOT  88*  93*   ALTSGPT  66*  70*   ALKPHOSPHAT  151*  172*   TBILIRUBIN  6.7*  6.3*           Hemodynamics:  Temp (24hrs), Av.2 °C (97.1 °F), Min:35.8 °C (96.4 °F), Max:36.4 °C (97.6 °F)  Temperature: (!) 35.8 °C (96.4 °F)  Pulse  Av.8  Min: 63  Max: 99   Blood Pressure : 106/52 mmHg     Respiratory:    Respiration: 20, Pulse Oximetry: 93 %        RUL Breath Sounds: Clear, RML Breath Sounds: Diminished, RLL Breath Sounds: Diminished, SARAH Breath Sounds: Clear, LLL Breath Sounds: Diminished  Fluids:    Intake/Output Summary (Last 24 hours) at 17 1527  Last data filed at 17 1253   Gross per 24 hour   Intake    400 ml   Output   2408 ml   Net  -2008 ml     Weight: 73.3 kg (161 lb 9.6 oz)  GI/Nutrition:  Orders Placed This Encounter   Procedures   • DIET ORDER     Standing Status: Standing      Number of Occurrences: 1      Standing Expiration Date:      Order Specific Question:  Diet:     Answer:  2 Gram Sodium [7]      Comments:  Strict 1:1 feeding; crush meds in applesauce/pudding, please encourage PO intake     Order Specific Question:  Diet:     Answer:  Renal [8]     Order Specific Question:  Texture/Fiber modifications:     Answer:  Dysphagia 2(Pureed/Chopped)specify fluid consistency(question 6) [2]     Order Specific Question:  Consistency/Fluid modifications:     Answer:  Thin Liquids [3]     Order Specific Question:  Miscellaneous modifications:     Answer:  SLP - 1:1 Supervision by Nursing  [21]     Medical Decision Making, by Problem:  Active Hospital Problems    Diagnosis   • Upper GI bleed [K92.2]   • Bacteremia due to Escherichia coli [R78.81]   • Sepsis (CMS-HCC) [A41.9]   • Alcoholic cirrhosis of liver with ascites (CMS-HCC) [K70.31]   • Chronic hepatitis C virus infection (CMS-HCC) [B18.2]   • Acute kidney injury (CMS-HCC) [N17.9]   • Urinary tract infection [N39.0]   • Hyperbilirubinemia [E80.6]   • Thrombocytopenia (CMS-HCC) [D69.6]   • Elevated troponin [R74.8]   • Abdominal pain [R10.9]   • Alcoholic hepatitis [K70.10]   • Excoriation of buttock [S30.810A]     1. BENNETT requiring HD -poor UOP -cr continues to increase, continue dialysis on TTS schedule  2. Electrolytes:Hyponatremia: normalized, mild hypokalemia -improved  3. Acidosis: corrected  4. Anemia: Monitor H/H  Recs;  -HD today  -monitor BMP  -diet renal  -all meds to renal doses  Prognosis poor   Labs reviewed and Medications reviewed

## 2017-07-11 NOTE — PROGRESS NOTES
"Pharmacy Kinetics 82 y.o. female on vancomycin day # 10 2017    Currently Dose: vancomycin pulse dosing   1900 mg iv once  2. 7/3 VR 20.3 mcg/mL   600 mg iv once  ·  VR 11.8 mcg/mL   800 mg iv once  ·  VR 11.7 mcg/mL  · /8 VR 15.0 mcg/mL  · 7/10 VR 10.8  7/10 600 mg iv once    Indication for Treatment: intra-abdominal infection (persistent SBP)  ID Service Following: No    Pertinent history per medical record: Admitted on 2017 for cholangitis with concurrent renal failure. Nephrology consulted with plans for continued iHD. GI consulting for melena/cirrhosis.    Other antibiotics: piperacillin/tazobactam 2.25 gm iv q12h    Allergies: Heparin     List concerns for accumulation of vancomycin: renal dysfunction (w/iHD), electrolyte derangement, abnormal LFT's, cirrhosis, BMI ~29, low albumin/malnutrition    Pertinent cultures to date:    17 CDiff PCR (negative)  17 paracentesis fluid WCx x2 (negative to date)  17 BCx x2 (negative)  17 paracentesis fluid WCx x1 (negative)  17 BCx x2 (negative)  17 UCx x1 (negative)  17 buttock WCx x1 (negative)  17 BCx x2 (Escherichia coli)    Recent Labs      17   0326  17   0321   WBC  12.8*  13.5*   NEUTSPOLYS  88.60*  83.40*   BANDSSTABS  1.80   --      Recent Labs      17   0326  07/10/17   0312  17   0321   BUN  26*  36*  45*   CREATININE  2.04*  2.34*  2.85*   ALBUMIN  2.8*  2.7*  2.9*     Recent Labs      07/10/17   0312   VANCORANDOM  10.8     Intake/Output Summary (Last 24 hours) at 17 1003  Last data filed at 17 0600   Gross per 24 hour   Intake      0 ml   Output    200 ml   Net   -200 ml      Blood pressure 147/55, pulse 82, temperature 36.2 °C (97.1 °F), resp. rate 18, height 1.6 m (5' 3\"), weight 73.3 kg (161 lb 9.6 oz), SpO2 93 %, not currently breastfeeding. Temp (24hrs), Av.2 °C (97.2 °F), Min:36.1 °C (97 °F), Max:36.4 °C (97.6 °F)    A/P   3. Next vancomycin level: " 7/12/17 random with AM labs (ordered)  4. Goal trough: 12-16 mcg/mL  5. Comments: VS stable. Afebrile. WBC elevated/stable. Continued ESRD w/iHD (TTS). No new microbiology. Dose was given yesterday. Plan to check a level in tomorrow as the patient has been requiring every other day dosing. Consider ID consult. Pharmacy will follow and continue to adjust as appropriate.    Colette Vasquez, PharmD

## 2017-07-11 NOTE — PROGRESS NOTES
Report received from Sarah RN. Patient lying in bed at this time. Patient does have jarrett and BMS, patient in bilateral wrist restraints with appropriate order. Wound care done with Sarah. Patient has all safety measures in place. No immediate concerns for patient at this time.

## 2017-07-11 NOTE — PALLIATIVE CARE
Palliative Care follow-up  Kelby called PC RN, he and pt discussed GOC and have decided to go home with hospice. PC RN asked if they talked with MD yet, they have not but would be willing to talk with him tomorrow. PC RN discussed obtaining choice, they do not have any provider in mind but will talk with the SW tomorrow regarding hospice choice.     Spoke with [facility] MD, he will visit with pt and  today to discuss POC.     Updated:       Plan:   Continue to support pt and family, discharge home with hospice pending acceptance     Thank you for allowing Palliative Care to participate in this patient's care. Please feel free to call [phone] with any questions or concerns.

## 2017-07-11 NOTE — PROGRESS NOTES
"  Gastroenterology Progress Note     Author: Juancarlos Osborne MD   Date & Time Created: 7/11/2017 8:08 AM    Interval History:  Problem: SBP, end-stage liver disease, hepatitis C, alcohol    This is an 82 year old woman admitted 6/21/2017 with abdominal pain, jaundice, ascites, and renal failure.  She was seen by Dr. 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  She has CT scan evidence of ascites, cirrhosis, splenorenal and gastroesophageal varices.  Her last paracentesis 7/1/1017 was consistent with SBP and she is being treated with vancomycin and Zosyn.    7/11/2017: No new complaints. No signs of bleeding. Reports wanting to go home.    Review of Systems:  Review of Systems   Unable to perform ROS: mental acuity       Physical Exam:  Physical Exam   Constitutional:   Chronically ill appearing woman.   Eyes: Scleral icterus is present.   Cardiovascular: Normal rate and regular rhythm.    Murmur heard.  Pulmonary/Chest: Effort normal and breath sounds normal. She has no wheezes. She has no rales.   Abdominal: Soft. Bowel sounds are normal. She exhibits distension (Moderate with central tympany and flank dullness.). She exhibits no mass. There is no tenderness.   Neurological: She is alert.   Confused - \"He left me twice and has now abandoned me\" speaking of  who has been visiting daily.       Labs:        Invalid input(s): [id number]      Recent Labs      07/09/17   0326  07/10/17   0312  07/11/17   0321   SODIUM  139  139  140   POTASSIUM  4.0  3.9  3.8   CHLORIDE  106  106  106   CO2  25  25  23   BUN  26*  36*  45*   CREATININE  2.04*  2.34*  2.85*   PHOSPHORUS   --   3.5   --    CALCIUM  8.9  8.9  9.3     Recent Labs      07/09/17   0326  07/10/17   0312  07/11/17   0321   ALTSGPT  66*   --   70*   ASTSGOT  88*   --   93*   ALKPHOSPHAT  151*   --   172*   TBILIRUBIN  6.7*   --   6.3*   GLUCOSE  100*  90  104*     Recent Labs      07/09/17 0326 07/11/17 0321   RBC  2.42*  2.39* "   HEMOGLOBIN  8.2*  8.0*   HEMATOCRIT  25.7*  25.6*   PLATELETCT  30*  39*     Recent Labs      17   0326  17   0321   WBC  12.8*  13.5*   NEUTSPOLYS  88.60*  83.40*   LYMPHOCYTES  2.60*  5.30*   MONOCYTES  7.00  8.90   EOSINOPHILS  0.00  1.60   BASOPHILS  0.00  0.10   ASTSGOT  88*  93*   ALTSGPT  66*  70*   ALKPHOSPHAT  151*  172*   TBILIRUBIN  6.7*  6.3*     Hemodynamics:  Temp (24hrs), Av.2 °C (97.2 °F), Min:36.1 °C (97 °F), Max:36.4 °C (97.6 °F)  Temperature: 36.1 °C (97 °F)  Pulse  Av.8  Min: 63  Max: 99   Blood Pressure : 146/51 mmHg     Respiratory:    Respiration: 18, Pulse Oximetry: 95 %        RUL Breath Sounds: Clear, RML Breath Sounds: Diminished, RLL Breath Sounds: Diminished, SARAH Breath Sounds: Clear, LLL Breath Sounds: Diminished  Fluids:    Intake/Output Summary (Last 24 hours) at 17 1058  Last data filed at 17 2130   Gross per 24 hour   Intake    620 ml   Output   2600 ml   Net  -1980 ml     Weight: 73.3 kg (161 lb 9.6 oz)  GI/Nutrition:  Orders Placed This Encounter   Procedures   • DIET ORDER     Standing Status: Standing      Number of Occurrences: 1      Standing Expiration Date:      Order Specific Question:  Diet:     Answer:  2 Gram Sodium [7]      Comments:  Strict 1:1 feeding; crush meds in applesauce/pudding, please encourage PO intake     Order Specific Question:  Diet:     Answer:  Renal [8]     Order Specific Question:  Texture/Fiber modifications:     Answer:  Dysphagia 2(Pureed/Chopped)specify fluid consistency(question 6) [2]     Order Specific Question:  Consistency/Fluid modifications:     Answer:  Thin Liquids [3]     Order Specific Question:  Miscellaneous modifications:     Answer:  SLP - 1:1 Supervision by Nursing [21]     Medical Decision Making, by Problem:  Active Hospital Problems    Diagnosis   • Sepsis (CMS-HCC) [A41.9]   • Thrombocytopenia (CMS-HCC) [D69.6]   • Bacteremia due to Escherichia coli [R78.81]   • Hepatitis C antibody test  positive [R76.8]   • Acute kidney injury (CMS-HCC) [N17.9]   • Urinary tract infection [N39.0]   • Hyperbilirubinemia [E80.6]   • Alcoholic cirrhosis of liver with ascites (CMS-HCC) [K70.31]   • Alcoholic hepatitis [K70.10]   • Decubitus ulcer [L89.90]   • Incontinence associated dermatitis [L30.8, R32]   • Altered mental status [R41.82]     Impression:  1. SBP  Slow improvement.  Still has low grade leukocytosis, but abdominal tenderness is diminished. Distention still exists.  2. E coli bacteremia.  3. Acute kidney disease due to IV contrast +/- HRS, on dialysis.  4. 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  Decompensated.  Poor prognosis.  5. Thrombocytopenia.  6. Mild encephalopathy, on rifaximin and lactulose.    Plan:  1. Supportive care.  2. Guarded long term prognosis given lack of significant improvement. Goals of care discussion today.     Labs reviewed

## 2017-07-11 NOTE — PROGRESS NOTES
Internal Medicine Interval Note    Name Abigail Bradley     1935   Age/Sex 82 y.o. female   MRN [id number]   Code Status DNR     After 5PM or if no immediate response to page, please call for cross-coverage  Attending/Team: Breann/Melchor See Patient List for primary contact information  Call [phone] to page after hours   1st Call - Day Intern (R1):   Nico 2nd Call - Day Sr. Resident (R2/R3):   Bennett         Reason for interval visit  (Principal Problem)   Sepsis    Interval Problem Daily Status Update  (24 hours)   Pt remains extremely confused but appears to answer appropriately when prompted. Pt states that she is comfortable but still appears emotionally distraught apologizing for her confusion and saying she just wants to go home. Pt denied any other concerns or complaints. Returned to room in afternoon to speak to , he had questions about his wife's condition as they are considering hospice after speaking to palliative.       ROS   Constitutional: Positive for malaise/fatigue. Negative for fever and chills.   HENT: Negative for congestion and sore throat.    Eyes: Negative for pain, discharge and redness.         Icterus   Respiratory: Negative for cough, hemoptysis and wheezing.    Cardiovascular: Positive for leg swelling. Negative for chest pain and palpitations.   Gastrointestinal: Positive for diarrhea. Negative for nausea, vomiting, abdominal pain, constipation, blood in stool and melena.   Genitourinary: Negative for hematuria and flank pain.   Musculoskeletal: Negative for myalgias, back pain and neck pain.   Skin: Negative for itching and rash.   Neurological: Positive for dizziness, tingling, tremors and weakness. Negative for sensory change, speech change, focal weakness and headaches.   Endo/Heme/Allergies: Bruises/bleeds easily.     Consultants/Specialty  Sepsis  -Currently on Zosyn and Vancomycin    Thrombocytopenia  -Platelets trending upward    Acute Kidney  Failure  -Nephro following, TTS schedule for HD, still producing urine    Cirrhosis  -GI following, recommend goals of care discussion  -Palliative following, had discussion with family and they are considering hospice    Disposition  Inpatient    Quality Measures  Core Measures  Labs reviewed and Medications reviewed  العراقي catheter: Critically Ill - Requiring Accurate Measurement of Urinary Output  Central line in place: Dialysis  DVT Prophylaxis: Contraindicated - High bleeding risk  DVT prophylaxis - mechanical: SCDs  Antibiotics: Treating active infection/contamination beyond 24 hours perioperative coverage      Physical Exam       Filed Vitals:    07/11/17 0422 07/11/17 0846 07/11/17 0951 07/11/17 1253   BP: 146/51 147/55 151/68 106/52   Pulse: 79 82 81 83   Temp: 36.1 °C (97 °F) 36.2 °C (97.1 °F) 36.1 °C (97 °F) 35.8 °C (96.4 °F)   Resp: 18 18 18 20   Height:       Weight:       SpO2: 95% 93%       Body mass index is 28.63 kg/(m^2). Weight: 73.3 kg (161 lb 9.6 oz)  Oxygen Therapy:  Pulse Oximetry: 93 %, O2 (LPM): 0, O2 Delivery: None (Room Air)    Physical Exam  HENT:    Head: Normocephalic and atraumatic.   Eyes: EOM are normal. Pupils are equal, round, and reactive to light. Right eye exhibits no discharge. Left eye exhibits no discharge. Scleral icterus is present.   Neck: No JVD present. No tracheal deviation present.   Cardiovascular: Normal rate and regular rhythm.  Exam reveals no gallop and no friction rub.     No murmur heard.  Pulmonary/Chest: Breath sounds normal. She has no wheezes. She has no rales.   Abdominal: She exhibits distension. There is no tenderness. There is no rebound and no guarding.   Musculoskeletal: She exhibits edema. She exhibits no tenderness.   Lymphadenopathy:     She has no cervical adenopathy.   Neurological: She is alert. GCS score is 15.   Skin:   Jaundice     Lab Data Review:         7/11/2017  2:44 PM    Recent Labs      07/09/17   0326  07/10/17   0312  07/11/17   0321    SODIUM  139  139  140   POTASSIUM  4.0  3.9  3.8   CHLORIDE  106  106  106   CO2  25  25  23   BUN  26*  36*  45*   CREATININE  2.04*  2.34*  2.85*   PHOSPHORUS   --   3.5   --    CALCIUM  8.9  8.9  9.3       Recent Labs      07/09/17 0326  07/10/17   0312  07/11/17 0321   ALTSGPT  66*   --   70*   ASTSGOT  88*   --   93*   ALKPHOSPHAT  151*   --   172*   TBILIRUBIN  6.7*   --   6.3*   GLUCOSE  100*  90  104*       Recent Labs      07/09/17 0326  07/11/17 0321   RBC  2.42*  2.39*   HEMOGLOBIN  8.2*  8.0*   HEMATOCRIT  25.7*  25.6*   PLATELETCT  30*  39*       Recent Labs      07/09/17 0326 07/11/17 0321   WBC  12.8*  13.5*   NEUTSPOLYS  88.60*  83.40*   LYMPHOCYTES  2.60*  5.30*   MONOCYTES  7.00  8.90   EOSINOPHILS  0.00  1.60   BASOPHILS  0.00  0.10   ASTSGOT  88*  93*   ALTSGPT  66*  70*   ALKPHOSPHAT  151*  172*   TBILIRUBIN  6.7*  6.3*           Assessment/Plan     Acute kidney injury (CMS-HCC) (present on admission)  -Likely 2/2 to HENRY. However ATN or hepatorenal syndrome could be another possibility.    -FeNa is 0.11% showing pre-renal.    -Urine eosonophils are negative.  -Daily urine 100-200 mL  -Electrolytes stable wnl  -Nephro following, HD on TTS schedule     Sepsis (CMS-HCC) (present on admission)  -2/2 to UTI vs SBP  -Blood cultures identified E.coli in 2/2 bottles on 06/21  -Results from ascites fluid culture negative, morphology showed >250 PMNs  -UA suggestive of UTI but urine cultures nonspecific  -C diff PCR toxin taken on 7/1 are negative  -Blood cultures taken on 06/30 were negative    -WBC increased slightly today to 13.5  -Will continue Zosyn and Vancomycin, should cover both SBP and UTI    Thrombocytopenia (CMS-HCC) (present on admission)  -Platelets continue to increase  -Serotonin Assay negative  -Continuing to hold heparin due to high bleeding risk  -Will continue to monitor via CBC     Alcoholic cirrhosis of liver with ascites (CMS-HCC) (present on admission)  -2/2  alcohol  related from chronic alcohol abuse vs HCV  -Pt denies drinking for ~1 month prior to admission  -Identified with US with signs of portal HTN on CT  -HCV confirmed via RNA PCR  -Child-juarez's class C and has a MELD score of 26 indicating high mortality.  -LFT's and T. Bili elevated as of yesterday. Albumin continues to be low.    -Paracentesis on 7/1 which drained 4L and showed 500 WBC with 86% neutrophils, culture is negative however  -Continue Zosyn/Vanco for SBP.    -Continue Trental for alcoholic hepatitis    -GI following, recommend discussion with palliative concerning goals of care as pts condition is guarded   -Palliative following, had discussion with family today, family is considering hospice    Excoriation of buttock (present on admission)  -2/2 from bed bound status  -Wound care following

## 2017-07-12 NOTE — DISCHARGE PLANNING
Hospice choice form completed for [facility] with pt and pt's , Florian Bradley.  Faxed to [facility]. Waiting for approval.

## 2017-07-12 NOTE — PROGRESS NOTES
Nephrology Progress Note, Adult, Complex               Author: Fadi Najjar Date & Time created: 7/12/2017  4:17 PM     Interval History:  82 year old with Hep C, cirrhosis, with BENNETT  -HENRY most likely.  Awake though confused  C/o fatigue  Low Plt -HIT Ab (+)  HD TTS -off heparin    Review of Systems:  Review of Systems   Constitutional: Positive for malaise/fatigue. Negative for fever and chills.   HENT: Negative for congestion, nosebleeds and sore throat.    Respiratory: Negative for cough, shortness of breath and wheezing.    Cardiovascular: Negative for chest pain, palpitations and orthopnea.   Gastrointestinal: Negative for nausea, vomiting and abdominal pain.   Genitourinary: Negative for dysuria.   Skin: Negative for itching and rash.   Neurological: Negative for headaches.       Physical Exam:  Physical Exam   Constitutional: She appears well-developed and well-nourished. She appears ill. No distress.   HENT:   Head: Normocephalic and atraumatic.   Mouth/Throat: Oropharynx is clear and moist.   Eyes: Conjunctivae and EOM are normal. Pupils are equal, round, and reactive to light.   Neck: Normal range of motion. Neck supple.   Cardiovascular: Normal rate and regular rhythm.    Pulmonary/Chest: Effort normal and breath sounds normal. No respiratory distress. She has no wheezes. She has no rales.   Abdominal: Soft. She exhibits distension. There is no tenderness.   Musculoskeletal: She exhibits edema. She exhibits no tenderness.   Neurological: She is alert.   Skin: Skin is warm and dry.       Labs:        Invalid input(s): [id number]      Recent Labs      07/10/17   0312  07/11/17   0321   SODIUM  139  140   POTASSIUM  3.9  3.8   CHLORIDE  106  106   CO2  25  23   BUN  36*  45*   CREATININE  2.34*  2.85*   PHOSPHORUS  3.5   --    CALCIUM  8.9  9.3     Recent Labs      07/10/17   0312  07/11/17   0321   ALTSGPT   --   70*   ASTSGOT   --   93*   ALKPHOSPHAT   --   172*   TBILIRUBIN   --   6.3*   GLUCOSE  90   104*     Recent Labs      17   0321   RBC  2.39*   HEMOGLOBIN  8.0*   HEMATOCRIT  25.6*   PLATELETCT  39*     Recent Labs      17   0321   WBC  13.5*   NEUTSPOLYS  83.40*   LYMPHOCYTES  5.30*   MONOCYTES  8.90   EOSINOPHILS  1.60   BASOPHILS  0.10   ASTSGOT  93*   ALTSGPT  70*   ALKPHOSPHAT  172*   TBILIRUBIN  6.3*           Hemodynamics:  Temp (24hrs), Av.4 °C (97.5 °F), Min:36.1 °C (97 °F), Max:36.7 °C (98.1 °F)  Temperature: 36.3 °C (97.4 °F)  Pulse  Av  Min: 63  Max: 99   Blood Pressure : 128/67 mmHg     Respiratory:    Respiration: 16, Pulse Oximetry: 93 %        RUL Breath Sounds: Clear, RML Breath Sounds: Diminished, RLL Breath Sounds: Diminished, SARAH Breath Sounds: Clear, LLL Breath Sounds: Diminished  Fluids:    Intake/Output Summary (Last 24 hours) at 17 1617  Last data filed at 17 0800   Gross per 24 hour   Intake    240 ml   Output    150 ml   Net     90 ml        GI/Nutrition:  Orders Placed This Encounter   Procedures   • DIET ORDER     Standing Status: Standing      Number of Occurrences: 1      Standing Expiration Date:      Order Specific Question:  Diet:     Answer:  2 Gram Sodium [7]      Comments:  Strict 1:1 feeding; crush meds in applesauce/pudding, please encourage PO intake     Order Specific Question:  Diet:     Answer:  Renal [8]     Order Specific Question:  Texture/Fiber modifications:     Answer:  Dysphagia 2(Pureed/Chopped)specify fluid consistency(question 6) [2]     Order Specific Question:  Consistency/Fluid modifications:     Answer:  Thin Liquids [3]     Order Specific Question:  Miscellaneous modifications:     Answer:  SLP - 1:1 Supervision by Nursing [21]     Medical Decision Making, by Problem:  Active Hospital Problems    Diagnosis   • Upper GI bleed [K92.2]   • Bacteremia due to Escherichia coli [R78.81]   • Sepsis (CMS-HCC) [A41.9]   • Alcoholic cirrhosis of liver with ascites (CMS-HCC) [K70.31]   • Chronic hepatitis C virus infection  (CMS-HCC) [B18.2]   • Acute kidney injury (CMS-HCC) [N17.9]   • Urinary tract infection [N39.0]   • Hyperbilirubinemia [E80.6]   • Thrombocytopenia (CMS-HCC) [D69.6]   • Elevated troponin [R74.8]   • Abdominal pain [R10.9]   • Alcoholic hepatitis [K70.10]   • Excoriation of buttock [S30.810A]     1. BENNETT requiring HD -poor UOP -cr continues to increase, continue dialysis on TTS schedule  2. Electrolytes:Hyponatremia: normalized, mild hypokalemia -improved  3. Acidosis: corrected  4. Anemia: Monitor H/H  Recs;  No acute need for HD today  -monitor BMP  -diet renal  -all meds to renal doses  Prognosis poor   Labs reviewed and Medications reviewed

## 2017-07-12 NOTE — PROGRESS NOTES
"Pharmacy Kinetics 82 y.o. female on vancomycin day # 11 2017    Currently Dose: vancomycin pulse dosing   1900 mg iv once  2. 7/3 VR 20.3 mcg/mL   600 mg iv once  ·  VR 11.8 mcg/mL   800 mg iv once  ·  VR 11.7 mcg/mL  · /8 VR 15.0 mcg/mL  · 7/10 VR 10.8  7/10 600 mg iv oncer   Vancomycin 1000 mg IV x 1 dose     Indication for Treatment: Persistent SBP    Pertinent history per medical record: Admitted on 2017 for abdominal pain, jaundice, ascites.  Pt with renal failure on iHD. She was found to have e.coli bacteremia for which she is being treated with Zosyn therapy.  Vancomycin was added for persistent SBP.      Other antibiotics: piperacillin/tazobactam 2.25 gm iv q12h    Allergies: Review of patient's allergies indicates no active allergies.     List concerns for renal function:  renal dysfunction (w/iHD), electrolyte derangement, abnormal LFT's, cirrhosis, BMI ~29, low albumin/malnutrition    Pertinent cultures to date:   17 CDiff PCR (negative)  17 paracentesis fluid WCx x2 negative  17 BCx x2 (negative)  17 paracentesis fluid WCx x1 (negative)  17 BCx x2 (negative)  17 UCx x1 (negative)  17 buttock WCx x1 (negative)  17 BCx x2 (Escherichia coli)    Recent Labs      17   0321   WBC  13.5*   NEUTSPOLYS  83.40*     Recent Labs      07/10/17   0312  17   0321   BUN  36*  45*   CREATININE  2.34*  2.85*   ALBUMIN  2.7*  2.9*     Recent Labs      07/10/17   0312  17   2349   VANCORANDOM  10.8  11.5     Intake/Output Summary (Last 24 hours) at 17 1009  Last data filed at 17 0800   Gross per 24 hour   Intake    640 ml   Output   2358 ml   Net  -1718 ml      Blood pressure 135/72, pulse 82, temperature 36.4 °C (97.6 °F), resp. rate 16, height 1.6 m (5' 3\"), weight 73.3 kg (161 lb 9.6 oz), SpO2 93 %, not currently breastfeeding. Temp (24hrs), Av.3 °C (97.3 °F), Min:35.8 °C (96.4 °F), Max:36.7 °C (98.1 " °F)      A/P   1. Vancomycin dose change: Vancomycin 1gm IV x1 this morning  2. Next vancomycin level: no indicated  3. Goal trough: 12-16 mcg/mL  4. Comments: Today is day 11 of vancomycin therapy.  Dose given today will last at least another 48 hours.  Please consider discontinuing vancomycin therapy as patient has complete full course for SBP.     Cha Rojas, PharmD

## 2017-07-12 NOTE — PROGRESS NOTES
Internal Medicine Interval Note    Name Abigail Bradley     1935   Age/Sex 82 y.o. female   MRN [id number]   Code Status DNR     After 5PM or if no immediate response to page, please call for cross-coverage  Attending/Team: Breann/Melchor See Patient List for primary contact information  Call [phone] to page after hours   1st Call - Day Intern (R1):   Nico 2nd Call - Day Sr. Resident (R2/R3):   Bennett         Reason for interval visit  (Principal Problem)   Sepsis    HPI  Pt remains confused but pleasant as well as visibly distressed. Pt will attempt to remove lines due to confusion however. Pt denied any concerns or complaints though ROS is limited by confusion. Palliative discussion with family resulted in family deciding on hospice.     Interval Problem Daily Status Update  (24 hours)   Sepsis  -Currently on Zosyn and Vancomycin    Thrombocytopenia  -Platelets have been trending upward    Acute Kidney Failure  -Nephro following, TTS schedule for HD, still producing urine    Cirrhosis  -GI following, recommend goals of care discussion  -Palliative following, had discussion with family and they have agreed on hospice    ROS   Constitutional: Positive for malaise/fatigue. Negative for fever and chills.    HENT: Negative for congestion and sore throat.     Eyes: Negative for pain, discharge and redness.         Icterus    Respiratory: Negative for cough, hemoptysis and wheezing.     Cardiovascular: Positive for leg swelling. Negative for chest pain and palpitations.    Gastrointestinal: Positive for diarrhea. Negative for nausea, vomiting, abdominal pain, constipation, blood in stool and melena.    Genitourinary: Negative for hematuria and flank pain.    Musculoskeletal: Negative for myalgias, back pain and neck pain.    Skin: Negative for itching and rash.    Neurological: Positive for dizziness, tingling, tremors and weakness. Negative for sensory change, speech change, focal weakness and  headaches.    Endo/Heme/Allergies: Bruises/bleeds easily.     Consultants/Specialty  GI  Nephro  Palliative    Disposition  Inpatient     Quality Measures  Core Measures  Medications reviewed  العراقي catheter: Critically Ill - Requiring Accurate Measurement of Urinary Output  Central line in place: Dialysis  DVT Prophylaxis: Contraindicated - High bleeding risk  DVT prophylaxis - mechanical: SCDs  Antibiotics: Treating active infection/contamination beyond 24 hours perioperative coverage      Physical Exam       Filed Vitals:    07/12/17 0011 07/12/17 0401 07/12/17 0757 07/12/17 1116   BP: 130/57 152/70 135/72 128/67   Pulse: 85 79 82 80   Temp: 36.1 °C (97 °F) 36.2 °C (97.1 °F) 36.4 °C (97.6 °F) 36.3 °C (97.4 °F)   Resp: 18 19 16 16   Height:       Weight:       SpO2: 90% 91% 93% 93%     Body mass index is 28.63 kg/(m^2).    Oxygen Therapy:  Pulse Oximetry: 93 %, O2 (LPM): 0, O2 Delivery: None (Room Air)      Physical Exam  HENT:    Head: Normocephalic and atraumatic.    Eyes: EOM are normal. Pupils are equal, round, and reactive to light. Right eye exhibits no discharge. Left eye exhibits no discharge. Scleral icterus is present.    Neck: No JVD present. No tracheal deviation present.    Cardiovascular: Normal rate and regular rhythm.  Exam reveals no gallop and no friction rub.     No murmur heard.  Pulmonary/Chest: Breath sounds normal. She has no wheezes. She has no rales.    Abdominal: She exhibits distension. There is no tenderness. There is no rebound and no guarding.   Musculoskeletal: She exhibits edema. She exhibits no tenderness.   Lymphadenopathy:     She has no cervical adenopathy.   Neurological: She is alert. GCS score is 15.    Skin:   Jaundice     Lab Data Review:         7/12/2017  4:01 PM    Recent Labs      07/10/17   0312  07/11/17   0321   SODIUM  139  140   POTASSIUM  3.9  3.8   CHLORIDE  106  106   CO2  25  23   BUN  36*  45*   CREATININE  2.34*  2.85*   PHOSPHORUS  3.5   --    CALCIUM  8.9   9.3       Recent Labs      07/10/17   0312  07/11/17   0321   ALTSGPT   --   70*   ASTSGOT   --   93*   ALKPHOSPHAT   --   172*   TBILIRUBIN   --   6.3*   GLUCOSE  90  104*       Recent Labs      07/11/17   0321   RBC  2.39*   HEMOGLOBIN  8.0*   HEMATOCRIT  25.6*   PLATELETCT  39*       Recent Labs      07/11/17   0321   WBC  13.5*   NEUTSPOLYS  83.40*   LYMPHOCYTES  5.30*   MONOCYTES  8.90   EOSINOPHILS  1.60   BASOPHILS  0.10   ASTSGOT  93*   ALTSGPT  70*   ALKPHOSPHAT  172*   TBILIRUBIN  6.3*           Assessment/Plan     Acute kidney injury (CMS-HCC) (present on admission)  -Likely 2/2 to HENRY. However ATN or hepatorenal syndrome could be another possibility.    -Daily urine remains 100-200 mL  -Electrolytes stable wnl  -Nephro following, HD on TTS schedule     Sepsis (CMS-HCC) (present on admission)  -2/2 to UTI vs SBP  -Blood cultures identified E.coli in 2/2 bottles on 06/21  -Results from ascites fluid culture negative, morphology showed >250 PMNs  -UA suggestive of UTI but urine cultures nonspecific  -C diff PCR toxin taken on 7/1 are negative  -Blood cultures taken on 06/30 were negative    -WBC increased slightly today to 13.5  -Will continue Zosyn and Vancomycin, should cover both SBP and UTI    Thrombocytopenia (CMS-HCC) (present on admission)  -Platelets continue to increase  -Serotonin Assay negative  -Continuing to hold heparin due to high bleeding risk  -Will continue to monitor via CBC     Alcoholic cirrhosis of liver with ascites (CMS-HCC) (present on admission)  -2/2  alcohol related from chronic alcohol abuse vs HCV  -Pt denies drinking for ~1 month prior to admission  -Identified with US with signs of portal HTN on CT  -HCV confirmed via RNA PCR  -Child-juarez's class C and has a MELD score of 26 indicating high mortality.  -LFT's and T. Bili elevated as of yesterday. Albumin continues to be low.    -Paracentesis on 7/1 which drained 4L and showed 500 WBC with 86% neutrophils, culture is negative  however  -Continue Zosyn/Vanco for SBP.    -Continue Trental for alcoholic hepatitis    -GI following, recommend discussion with palliative concerning goals of care as pts condition is guarded    -Palliative following, family decided on hospice     Excoriation of buttock (present on admission)  -2/2 from bed bound status  -Wound care following

## 2017-07-12 NOTE — PROGRESS NOTES
Report received from Magalis CRESPO. Patient lying in bed. Patient seems to be using accessory muscles to breathe. Patient VSS, patient repositioned to help with breathing. Patient in bilateral restraints with active order. Patient states that she is feeling ok. No immediate concerns for patient at this time. All safety measures in place.

## 2017-07-12 NOTE — PROGRESS NOTES
"  Gastroenterology Progress Note     Author: Juancarlos Osborne MD   Date & Time Created: 7/12/2017 8:49 AM    Interval History:  Problem: SBP, end-stage liver disease, hepatitis C, alcohol    This is an 82 year old woman admitted 6/21/2017 with abdominal pain, jaundice, ascites, and renal failure.  She was seen by Dr. 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  She has CT scan evidence of ascites, cirrhosis, splenorenal and gastroesophageal varices.  Her last paracentesis 7/1/1017 was consistent with SBP and she is being treated with vancomycin and Zosyn.    7/11/2017: No new complaints. No signs of bleeding. Reports wanting to go home.  7/12/2017: No issues overnight. Palliative care seen, hospice goals of care     Review of Systems:  Review of Systems   Unable to perform ROS: mental acuity       Physical Exam:  Physical Exam   Constitutional:   Chronically ill appearing woman.   Eyes: Scleral icterus is present.   Cardiovascular: Normal rate and regular rhythm.    Murmur heard.  Pulmonary/Chest: Effort normal and breath sounds normal. She has no wheezes. She has no rales.   Abdominal: Soft. Bowel sounds are normal. She exhibits distension (Moderate with central tympany and flank dullness.). She exhibits no mass. There is no tenderness.   Neurological: She is alert.   Confused - \"He left me twice and has now abandoned me\" speaking of  who has been visiting daily.       Labs:        Invalid input(s): [id number]      Recent Labs      07/10/17   0312  07/11/17   0321   SODIUM  139  140   POTASSIUM  3.9  3.8   CHLORIDE  106  106   CO2  25  23   BUN  36*  45*   CREATININE  2.34*  2.85*   PHOSPHORUS  3.5   --    CALCIUM  8.9  9.3     Recent Labs      07/10/17   0312  07/11/17   0321   ALTSGPT   --   70*   ASTSGOT   --   93*   ALKPHOSPHAT   --   172*   TBILIRUBIN   --   6.3*   GLUCOSE  90  104*     Recent Labs      07/11/17   0321   RBC  2.39*   HEMOGLOBIN  8.0*   HEMATOCRIT  25.6* "   PLATELETCT  39*     Recent Labs      17   0321   WBC  13.5*   NEUTSPOLYS  83.40*   LYMPHOCYTES  5.30*   MONOCYTES  8.90   EOSINOPHILS  1.60   BASOPHILS  0.10   ASTSGOT  93*   ALTSGPT  70*   ALKPHOSPHAT  172*   TBILIRUBIN  6.3*     Hemodynamics:  Temp (24hrs), Av.2 °C (97.2 °F), Min:35.8 °C (96.4 °F), Max:36.7 °C (98.1 °F)  Temperature: 36.4 °C (97.6 °F)  Pulse  Av  Min: 63  Max: 99   Blood Pressure : 135/72 mmHg     Respiratory:    Respiration: 16, Pulse Oximetry: 93 %        RUL Breath Sounds: Clear, RML Breath Sounds: Diminished, RLL Breath Sounds: Diminished, SARAH Breath Sounds: Clear, LLL Breath Sounds: Diminished  Fluids:    Intake/Output Summary (Last 24 hours) at 17 1058  Last data filed at 17 2130   Gross per 24 hour   Intake    620 ml   Output   2600 ml   Net  -1980 ml        GI/Nutrition:  Orders Placed This Encounter   Procedures   • DIET ORDER     Standing Status: Standing      Number of Occurrences: 1      Standing Expiration Date:      Order Specific Question:  Diet:     Answer:  2 Gram Sodium [7]      Comments:  Strict 1:1 feeding; crush meds in applesauce/pudding, please encourage PO intake     Order Specific Question:  Diet:     Answer:  Renal [8]     Order Specific Question:  Texture/Fiber modifications:     Answer:  Dysphagia 2(Pureed/Chopped)specify fluid consistency(question 6) [2]     Order Specific Question:  Consistency/Fluid modifications:     Answer:  Thin Liquids [3]     Order Specific Question:  Miscellaneous modifications:     Answer:  SLP - 1:1 Supervision by Nursing [21]     Medical Decision Making, by Problem:  Active Hospital Problems    Diagnosis   • Sepsis (CMS-HCC) [A41.9]   • Thrombocytopenia (CMS-HCC) [D69.6]   • Bacteremia due to Escherichia coli [R78.81]   • Hepatitis C antibody test positive [R76.8]   • Acute kidney injury (CMS-HCC) [N17.9]   • Urinary tract infection [N39.0]   • Hyperbilirubinemia [E80.6]   • Alcoholic cirrhosis of liver with  ascites (CMS-HCC) [K70.31]   • Alcoholic hepatitis [K70.10]   • Decubitus ulcer [L89.90]   • Incontinence associated dermatitis [L30.8, R32]   • Altered mental status [R41.82]     Impression:  1. SBP  Slow improvement.  Still has low grade leukocytosis, but abdominal tenderness is diminished. Distention still exists.  2. E coli bacteremia.  3. Acute kidney disease due to IV contrast +/- HRS, on dialysis.  4. 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  Decompensated.  Poor prognosis.  5. Thrombocytopenia.  6. Mild encephalopathy, on rifaximin and lactulose.    Plan:  1. Supportive care.  2. Palliative care, nephrology notes reviewed. Hospice care.    GI TO SIGN OFF / STAND BY - Thank you very much for allowing me to participate in the care of your patient.  Please feel free to contact me anytime at [phone]        Labs reviewed

## 2017-07-13 NOTE — PROGRESS NOTES
0820: Pt sent to dialysis via bed. Monitor room notified.  0830: Dialysis RN informed this RN that there is no dressing on dialysis catheter and that dialysis RN is unable to place new dressing. RN informed Charge RN, who will go to dialysis room and place new dressing.   0840: Manager spoke with Dialysis RN, who is going to place a new dialysis dressing.

## 2017-07-13 NOTE — PROCEDURES
Indication: Ascites   Resident: Errol Walsh  Supervising Physician: Dr. ATA Leiva    A time-out was completed verifying correct patient, procedure, site, positioning, and special equipment. The patient’s left side was prepped and draped in a sterile manner after the appropriate infiltration level was confirmed by ultrasound. 1% lidocaine was used anesthetize the surrounding skin. A finder needle was then used to locate fluid and clear straw colored fluid was obtained.  The paracentesis catheter was then threaded without difficulty. The patient had 3.6 L of straw colored fluid removed. Dr. Leiva was present for the entire procedure.     Estimated Blood Loss: 1 mL  The patient tolerated the procedure well and there were no complications.

## 2017-07-13 NOTE — PROGRESS NOTES
Hospice RN requesting drain be placed for paracentesis during home hospice. Discussed with [facility] team; they want to do a bedside paracentesis instead, after the pt returns from dialysis. Supplies ordered.

## 2017-07-13 NOTE — PROGRESS NOTES
Report received from Nawaf CRESPO. Patient lying in bed at this time. All safety measures in place. No immediate concerns for patient at this time.

## 2017-07-13 NOTE — THERAPY
Pt discharging home tomorrow on hospice. Per RN no further acute OT needs. Pt discharged from OT services.

## 2017-07-13 NOTE — WOUND TEAM
In to see patient for follow up care of buttocks wounds.  Resident doctor at bedside, paracentesis being completed at this time.  Informed by doctor that patient discharging tomorrow with hospice care.  Recent photos reviewed and wounds improving.  Hospice to continue care once home.  No other wound team needs at this time.  Please re-consult if any other needs come up.

## 2017-07-13 NOTE — PROGRESS NOTES
Pt returned from dialysis. Pt picking at dialysis dressing, but it is CDI. Pt encouraged to leave dressing alone. Pt's  at bedside.

## 2017-07-13 NOTE — PROGRESS NOTES
"Bedside report received. POC discussed with pt; pt is confused to time, location, and event. Pt reports, \"I . Why won't anyone believe me.\" Pt denies pain or needs at this time.   "

## 2017-07-13 NOTE — PROGRESS NOTES
HEMODIALYSIS NOTES:     HD today x 3 hours per Dr. Najjar. Initiated at 0840 and ended at 1140. Patient arrived in the HD unit, no dressing seen in the CVC site. Assessed the catheter site, suture still intact and in place. Nawaf CRESPO informed about the absence of dressing. Dr. Najjar also aware. This HD RN cleaned and placed a new dressing aseptically. Infection Control is aware that we can place the dressing per 7C Charge RN thru phone call in the Unit. Patient tolerated treatment. See paper flowsheet for details.     Informed Nawaf CRESPO as well to kindly check the patient's access at all times because patient was being observed during treatment on trying to remove the CVC dressing and pulling the catheter ports.     UF Net: 2,000 mL    R IJ intact and patent with good flow during dialysis. No s/sx of infection, no redness nor bleeding noted on the CVC site. CVC dressing clean, dry and intact. Blood returned and CVC port flushed with NS and Heparin 1000 units/mL used  to lock catheter given per designated amount. CVC port clamped and capped. Report given to ATA Shannon RN.

## 2017-07-13 NOTE — PROGRESS NOTES
"       Internal Medicine Interval Note    Name Abigail Bradley     1935   Age/Sex 82 y.o. female   MRN [id number]   Code Status DNR     After 5PM or if no immediate response to page, please call for cross-coverage  Attending/Team: Breann/Melchor See Patient List for primary contact information  Call [phone] to page after hours   1st Call - Day Intern (R1):   Nico 2nd Call - Day Sr. Resident (R2/R3):   Bennett         Reason for interval visit  (Principal Problem)   Sepsis    HPI  Pt appears even more confused today, no longer answering questions appropriately. Not matter what asked responds with \"I \". Pt appears visibly distressed. ROS limited due to the deterioration of the pts mental status.     Interval Problem Daily Status Update  (24 hours)   Sepsis  -Vanco DC'd and Zosyn deescalated to Rocephin    Thrombocytopenia  -Platelets have been trending upward    Acute Kidney Failure  -Nephro following, received final dialysis today    Cirrhosis  -GI following  -Performing palliative paracentesis this afternoon  -Palliative following, DC to hospice tomorrow     Review of Systems   Unable to perform ROS     Consultants/Specialty  GI  Nephro  Palliative    Disposition  Inpatient     Quality Measures  Core Measures  Medications reviewed  العراقي catheter: Critically Ill - Requiring Accurate Measurement of Urinary Output  Central line in place: Dialysis  DVT Prophylaxis: Contraindicated - High bleeding risk  DVT prophylaxis - mechanical: SCDs  Antibiotics: Treating active infection/contamination beyond 24 hours perioperative coverage      Physical Exam       Filed Vitals:    17 0320 17 0815 17 1145 17 1248   BP: 133/60 140/60 125/61 102/43   Pulse: 79 78 81 79   Temp: 36.6 °C (97.9 °F) 36.7 °C (98.1 °F) 36.5 °C (97.7 °F) 36.6 °C (97.8 °F)   Resp: 18 16 19 20   Height:       Weight:       SpO2: 94% 95%  93%     Body mass index is 27.66 kg/(m^2). Weight: 70.8 kg (156 lb 1.4 oz)  Oxygen " Therapy:  Pulse Oximetry: 93 %, O2 (LPM): 0, O2 Delivery: None (Room Air)      Physical Exam  HENT:    Head: Normocephalic and atraumatic.    Eyes: EOM are normal. Pupils are equal, round, and reactive to light. Right eye exhibits no discharge. Left eye exhibits no discharge. Scleral icterus is present.    Neck: No JVD present. No tracheal deviation present.    Cardiovascular: Normal rate and regular rhythm.  Exam reveals no gallop and no friction rub.     No murmur heard.  Pulmonary/Chest: Breath sounds normal. She has no wheezes. She has no rales.    Abdominal: She exhibits distension. There is no tenderness. There is no rebound and no guarding. Jaundiced.  Musculoskeletal: She exhibits edema. She exhibits no tenderness.   Lymphadenopathy:     She has no cervical adenopathy.   Neurological: She is alert.     Lab Data Review:         7/13/2017  1:31 PM    Recent Labs      07/11/17 0321   SODIUM  140   POTASSIUM  3.8   CHLORIDE  106   CO2  23   BUN  45*   CREATININE  2.85*   CALCIUM  9.3       Recent Labs      07/11/17 0321   ALTSGPT  70*   ASTSGOT  93*   ALKPHOSPHAT  172*   TBILIRUBIN  6.3*   GLUCOSE  104*       Recent Labs      07/11/17 0321   RBC  2.39*   HEMOGLOBIN  8.0*   HEMATOCRIT  25.6*   PLATELETCT  39*       Recent Labs      07/11/17 0321   WBC  13.5*   NEUTSPOLYS  83.40*   LYMPHOCYTES  5.30*   MONOCYTES  8.90   EOSINOPHILS  1.60   BASOPHILS  0.10   ASTSGOT  93*   ALTSGPT  70*   ALKPHOSPHAT  172*   TBILIRUBIN  6.3*           Assessment/Plan     Acute kidney injury (CMS-HCC) (present on admission)  -Likely 2/2 to HENRY. However ATN or hepatorenal syndrome could be another possibility.    -Nephro following, HD on TTS schedule   -Received final dialysis today  -No longer producing urine    Sepsis (CMS-HCC) (present on admission)  -2/2 to UTI vs SBP  -Blood cultures identified E.coli in 2/2 bottles on 06/21  -Results from ascites fluid culture negative, morphology showed >250 PMNs  -UA suggestive of UTI  but urine cultures nonspecific  -C diff PCR toxin taken on 7/1 are negative  -Blood cultures taken on 06/30 were negative    -DC'd Vanco and deescalated Zosyn to Rocephin per pharmacy    Thrombocytopenia (CMS-HCC) (present on admission)  -Appears to have stabalized  -Serotonin Assay negative  -Continuing to hold heparin due to high bleeding risk    Alcoholic cirrhosis of liver with ascites (CMS-HCC) (present on admission)  -2/2  alcohol related from chronic alcohol abuse vs HCV  -Pt denies drinking for ~1 month prior to admission  -Identified with US with signs of portal HTN on CT  -HCV confirmed via RNA PCR  -Child-juarez's class C and has a MELD score of 26 indicating high mortality.  -LFT's and T. Bili elevated as of yesterday. Albumin continues to be low.    -Paracentesis on 7/1 which drained 4L and showed 500 WBC with 86% neutrophils, culture is negative however  -Continue Trental for alcoholic hepatitis    -GI following, recommend discussion with palliative concerning goals of care as pts condition is guarded    -Palliative following, will be DC'd to hospice tomorrow  -Therapeutic paracentesis to be performed this afternoon    Excoriation of buttock (present on admission)  -2/2 from bed bound status  -Wound care following

## 2017-07-13 NOTE — THERAPY
"Speech Language Therapy dysphagia treatment completed.   Functional Status:  The patient was awake but reported she did not feel good. She was unable to describe in further detail. She consumed sips of thin liquid via straw needing verbal cues to take small sips. No overt signs of aspiration were noted. She declined solid foods due to not feeling well. Dysphagia exercises were unable to completed as patient was unable to attend to these directions. Spoke with RN who reported patient consumed breakfast D2, thin liquid meal this morning without signs of aspiration. EMR notes report family has decided on hospice once patient returns home.   Recommendations: Dysphagia 2, thin liquid. SLP will continue to follow up with patient, nursing staff, and family as available to ensure patient continues to be safe on current diet and for any educational requests by family. No aggressive dysphagia treatment like dysphagia exercises are warranted with patient/family's current POC wishes.    Plan of Care: Will benefit from Speech Therapy 3 times per week  Post-Acute Therapy: Discharge to a transitional care facility for continued skilled therapy services. and Discharge to home with outpatient or home health for additional skilled therapy services.    See \"Rehab Therapy-Acute\" Patient Summary Report for complete documentation.     "

## 2017-07-13 NOTE — PROGRESS NOTES
Nephrology Progress Note, Adult, Complex               Author: Fadi Najjar Date & Time created: 7/13/2017  1:18 PM     Interval History:  82 year old with Hep C, cirrhosis, with BENNETT  -HENRY most likely.  Awake though confused  C/o fatigue  Low Plt -HIT Ab (+)  HD TTS -off heparin  Seen and examined while getting HD.    Review of Systems:  Review of Systems   Constitutional: Positive for malaise/fatigue. Negative for fever and chills.   HENT: Negative for congestion, nosebleeds and sore throat.    Respiratory: Negative for cough, shortness of breath and wheezing.    Cardiovascular: Negative for chest pain, palpitations and orthopnea.   Gastrointestinal: Negative for nausea, vomiting and abdominal pain.   Genitourinary: Negative for dysuria.   Skin: Negative for itching and rash.   Neurological: Negative for headaches.       Physical Exam:  Physical Exam   Constitutional: She appears well-developed and well-nourished. She appears ill. No distress.   HENT:   Head: Normocephalic and atraumatic.   Mouth/Throat: Oropharynx is clear and moist.   Eyes: Conjunctivae and EOM are normal. Pupils are equal, round, and reactive to light.   Neck: Normal range of motion. Neck supple.   Cardiovascular: Normal rate and regular rhythm.    Pulmonary/Chest: Effort normal and breath sounds normal. No respiratory distress. She has no wheezes. She has no rales.   Abdominal: Soft. She exhibits distension. There is no tenderness.   Musculoskeletal: She exhibits edema. She exhibits no tenderness.   Neurological: She is alert.   Skin: Skin is warm and dry.       Labs:        Invalid input(s): [id number]      Recent Labs      07/11/17   0321   SODIUM  140   POTASSIUM  3.8   CHLORIDE  106   CO2  23   BUN  45*   CREATININE  2.85*   CALCIUM  9.3     Recent Labs      07/11/17 0321   ALTSGPT  70*   ASTSGOT  93*   ALKPHOSPHAT  172*   TBILIRUBIN  6.3*   GLUCOSE  104*     Recent Labs      07/11/17 0321   RBC  2.39*   HEMOGLOBIN  8.0*   HEMATOCRIT   25.6*   PLATELETCT  39*     Recent Labs      17   0321   WBC  13.5*   NEUTSPOLYS  83.40*   LYMPHOCYTES  5.30*   MONOCYTES  8.90   EOSINOPHILS  1.60   BASOPHILS  0.10   ASTSGOT  93*   ALTSGPT  70*   ALKPHOSPHAT  172*   TBILIRUBIN  6.3*           Hemodynamics:  Temp (24hrs), Av.6 °C (97.9 °F), Min:36.5 °C (97.7 °F), Max:36.9 °C (98.4 °F)  Temperature: 36.6 °C (97.8 °F)  Pulse  Av.1  Min: 63  Max: 99   Blood Pressure : 102/43 mmHg     Respiratory:    Respiration: 20, Pulse Oximetry: 93 %        RUL Breath Sounds: Clear, RML Breath Sounds: Diminished, RLL Breath Sounds: Diminished, SARAH Breath Sounds: Clear, LLL Breath Sounds: Diminished  Fluids:    Intake/Output Summary (Last 24 hours) at 17 1318  Last data filed at 17 1145   Gross per 24 hour   Intake    500 ml   Output   2500 ml   Net  -2000 ml     Weight: 70.8 kg (156 lb 1.4 oz)  GI/Nutrition:  Orders Placed This Encounter   Procedures   • DIET ORDER     Standing Status: Standing      Number of Occurrences: 1      Standing Expiration Date:      Order Specific Question:  Diet:     Answer:  2 Gram Sodium [7]      Comments:  Strict 1:1 feeding; crush meds in applesauce/pudding, please encourage PO intake     Order Specific Question:  Diet:     Answer:  Renal [8]     Order Specific Question:  Texture/Fiber modifications:     Answer:  Dysphagia 2(Pureed/Chopped)specify fluid consistency(question 6) [2]     Order Specific Question:  Consistency/Fluid modifications:     Answer:  Thin Liquids [3]     Order Specific Question:  Miscellaneous modifications:     Answer:  SLP - 1:1 Supervision by Nursing [21]     Medical Decision Making, by Problem:  Active Hospital Problems    Diagnosis   • Upper GI bleed [K92.2]   • Bacteremia due to Escherichia coli [R78.81]   • Sepsis (CMS-HCC) [A41.9]   • Alcoholic cirrhosis of liver with ascites (CMS-HCC) [K70.31]   • Chronic hepatitis C virus infection (CMS-HCC) [B18.2]   • Acute kidney injury (CMS-HCC) [N17.9]    • Urinary tract infection [N39.0]   • Hyperbilirubinemia [E80.6]   • Thrombocytopenia (CMS-HCC) [D69.6]   • Elevated troponin [R74.8]   • Abdominal pain [R10.9]   • Alcoholic hepatitis [K70.10]   • Excoriation of buttock [S30.810A]     1. BENNETT requiring HD -poor UOP -cr continues to increase, continue dialysis on TTS schedule  2. Electrolytes:Hyponatremia: normalized, mild hypokalemia -improved  3. Acidosis: corrected  4. Anemia: Monitor H/H  Recs;  HD today  -monitor BMP  -diet renal  -all meds to renal doses  Prognosis poor   Labs reviewed and Medications reviewed

## 2017-07-13 NOTE — DISCHARGE PLANNING
Medical Social Work    Pt accepted with [facility]. Anticipate pt will be ready to D/C home tomorrow AM with hospice. DME scheduled to be delivered to pt's home late this afternoon.

## 2017-07-14 PROBLEM — R78.81 BACTEREMIA DUE TO ESCHERICHIA COLI: Status: RESOLVED | Noted: 2017-01-01 | Resolved: 2017-01-01

## 2017-07-14 PROBLEM — A41.9 SEPSIS, UNSPECIFIED: Status: RESOLVED | Noted: 2017-01-01 | Resolved: 2017-01-01

## 2017-07-14 PROBLEM — N39.0 URINARY TRACT INFECTION: Status: RESOLVED | Noted: 2017-01-01 | Resolved: 2017-01-01

## 2017-07-14 PROBLEM — B96.20 BACTEREMIA DUE TO ESCHERICHIA COLI: Status: RESOLVED | Noted: 2017-01-01 | Resolved: 2017-01-01

## 2017-07-14 PROBLEM — E80.6 HYPERBILIRUBINEMIA: Status: RESOLVED | Noted: 2017-01-01 | Resolved: 2017-01-01

## 2017-07-14 PROBLEM — L89.90 DECUBITUS ULCER: Status: RESOLVED | Noted: 2017-01-01 | Resolved: 2017-01-01

## 2017-07-14 NOTE — DISCHARGE PLANNING
Received call from MONSERRAT Holland, she is requesting we push transport time out to 12:00 due to pt still needing to have catheter removed.     Transport has been rescheduled with Ramez at [facility] to 12:00 today. MONSERRAT Holland notified.

## 2017-07-14 NOTE — DISCHARGE INSTRUCTIONS
Discharge Instructions    Discharged to home by ambulance with escort. Discharged via ambulance, hospital escort: Yes.  Special equipment needed: العراقي and bowel management system    Be sure to schedule a follow-up appointment with your primary care doctor or any specialists as instructed.     Discharge Plan:   Diet Plan: Discussed  Activity Level: Discussed  Confirmed Follow up Appointment: No (Comments)  Confirmed Symptoms Management: Discussed  Medication Reconciliation Updated: Yes  Pneumococcal Vaccine Given - only chart on this line when given: Given (See MAR)  Influenza Vaccine Indication: Indicated: Not available from distributor/    I understand that a diet low in cholesterol, fat, and sodium is recommended for good health. Unless I have been given specific instructions below for another diet, I accept this instruction as my diet prescription.   Other diet: Regular    Special Instructions: None    · Is patient discharged on Warfarin / Coumadin?   No     · Is patient Post Blood Transfusion?  No    Depression / Suicide Risk    As you are discharged from this [facility] facility, it is important to learn how to keep safe from harming yourself.    Recognize the warning signs:  · Abrupt changes in personality, positive or negative- including increase in energy   · Giving away possessions  · Change in eating patterns- significant weight changes-  positive or negative  · Change in sleeping patterns- unable to sleep or sleeping all the time   · Unwillingness or inability to communicate  · Depression  · Unusual sadness, discouragement and loneliness  · Talk of wanting to die  · Neglect of personal appearance   · Rebelliousness- reckless behavior  · Withdrawal from people/activities they love  · Confusion- inability to concentrate     If you or a loved one observes any of these behaviors or has concerns about self-harm, here's what you can do:  · Talk about it- your feelings and reasons for harming  yourself  · Remove any means that you might use to hurt yourself (examples: pills, rope, extension cords, firearm)  · Get professional help from the community (Mental Health, Substance Abuse, psychological counseling)  · Do not be alone:Call your Safe Contact- someone whom you trust who will be there for you.  · Call your local CRISIS HOTLINE 622-6364 or 653-624-0088  · Call your local Children's Mobile Crisis Response Team [place] (493) 188-3921 or [employer]  · Call the toll free National Suicide Prevention Hotlines   · National Suicide Prevention Lifeline 869-050-ABEP (9073)  · [employer] Hope Line Network 800-SUICIDE (330-2814)    Morphine oral solution  What is this medicine?  MORPHINE (MOR feen) is a pain reliever. It is used to treat moderate to severe pain.  This medicine may be used for other purposes; ask your health care provider or pharmacist if you have questions.  COMMON BRAND NAME(S): MSIR, Roxanol, Roxanol-T  What should I tell my health care provider before I take this medicine?  They need to know if you have any of these conditions:  -brain tumor  -drug abuse or addiction  -head injury  -heart disease  -if you frequently drink alcohol-containing drinks  -intestinal disease  -kidney disease or problems urinating  -kyphoscoliosis  -liver disease  -lung disease, asthma, or breathing problems  -taken isocarboxazid, phenelzine, tranylcypromine, or selegiline in the past 2 weeks  -seizures  -an unusual or allergic reaction to morphine, other medicines, foods, dyes, or preservatives  -pregnant or trying to get pregnant  -breast-feeding  How should I use this medicine?  Take this medicine by mouth. Follow the directions on the prescription label. Use a specially marked spoon or container to measure each dose. Ask your pharmacist if you do not have one. Household spoons are not accurate. If the medicine upsets your stomach, take it with food or milk. Take your medicine at regular intervals. Do  not take it more often than directed. Do not stop taking except on your doctor's advice.  A special MedGuide will be given to you by the pharmacist with each prescription and refill. Be sure to read this information carefully each time.  Talk to your pediatrician regarding the use of this medicine in children. Special care may be needed.  Overdosage: If you think you have taken too much of this medicine contact a poison control center or emergency room at once.  NOTE: This medicine is only for you. Do not share this medicine with others.  What if I miss a dose?  If you miss a dose, take it as soon as you can. If it is almost time for your next dose, take only that dose. Do not take double or extra doses.  What may interact with this medicine?  Do not take this medicine with any of the following medications:  -MAOIs like Carbex, Eldepryl, Marplan, Nardil, and Parnate  This medicine may also interact with the following medications:  -alcohol  -antihistamines  -barbiturates, like phenobarbital  -medicines for depression, anxiety, or psychotic disturbances  -medicines for sleep  -muscle relaxants  -naltrexone, naloxone  -narcotic medicines (opiates) for pain  -rifampin  -tramadol  This list may not describe all possible interactions. Give your health care provider a list of all the medicines, herbs, non-prescription drugs, or dietary supplements you use. Also tell them if you smoke, drink alcohol, or use illegal drugs. Some items may interact with your medicine.  What should I watch for while using this medicine?  Tell your doctor or health care professional if your pain does not go away, if it gets worse, or if you have new or a different type of pain. You may develop tolerance to the medicine. Tolerance means that you will need a higher dose of the medicine for pain relief. Tolerance is normal and is expected if you take this medicine for a long time.  Do not suddenly stop taking your medicine because you may develop  a severe reaction. Your body becomes used to the medicine. This does NOT mean you are addicted. Addiction is a behavior related to getting and using a drug for a non-medical reason. If you have pain, you have a medical reason to take pain medicine. Your doctor will tell you how much medicine to take. If your doctor wants you to stop the medicine, the dose will be slowly lowered over time to avoid any side effects.  You may get drowsy or dizzy when you first start taking the medicine or change doses. Do not drive, use machinery, or do anything that may be dangerous until you know how the medicine affects you. Stand or sit up slowly.  There are different types of narcotic medicines (opiates) for pain. If you take more than one type at the same time, you may have more side effects. Give your health care provider a list of all medicines you use. Your doctor will tell you how much medicine to take. Do not take more medicine than directed. Call emergency for help if you have problems breathing.  This medicine will cause constipation. Try to have a bowel movement at least every 2 to 3 days. If you do not have a bowel movement for 3 days, call your doctor or health care professional.  Your mouth may get dry. Drinking water, chewing sugarless gum, or sucking on hard candy may help. See your dentist every 6 months.  What side effects may I notice from receiving this medicine?  Side effects that you should report to your doctor or health care professional as soon as possible:  -allergic reactions like skin rash, itching or hives, swelling of the face, lips, or tongue  -breathing problems  -change in the amount of urine  -confusion  -fever, chills  -hallucinations  -feeling faint or lightheaded  -red or sore at the injection site  -seizures  -slow or fast heartbeat  Side effects that usually do not require medical attention (report to your doctor or health care professional if they continue or are  bothersome):  -constipation  -dizzy, drowsy  -headache  -nausea, vomiting  -pinpoint pupils  -sweating  This list may not describe all possible side effects. Call your doctor for medical advice about side effects. You may report side effects to FDA at 8-981-FDA-0681.  Where should I keep my medicine?  Keep out of the reach of children. This medicine can be abused. Keep your medicine in a safe place to protect it from theft. Do not share this medicine with anyone. Selling or giving away this medicine is dangerous and is against the law.  Store at room temperature between 15 and 30 degrees C (59 and 86 degrees F). Keep container tightly closed.  Discard unused medicine and used packaging carefully. Pets and children can be harmed if they find used or lost packages. Flush any unused medicines down the toilet. Do not use the medicine after the expiration date. Follow the directions in the MedGuide.  NOTE: This sheet is a summary. It may not cover all possible information. If you have questions about this medicine, talk to your doctor, pharmacist, or health care provider.  © 2014, [employer]vier/Gold Standard. (8/24/2012 1:04:40 PM)      Lorazepam tablets  What is this medicine?  LORAZEPAM (francisca A ze sheyla) is a benzodiazepine. It is used to treat anxiety.  This medicine may be used for other purposes; ask your health care provider or pharmacist if you have questions.  COMMON BRAND NAME(S): Ativan  What should I tell my health care provider before I take this medicine?  They need to know if you have any of these conditions:  -alcohol or drug abuse problem  -bipolar disorder, depression, psychosis or other mental health condition  -glaucoma  -kidney or liver disease  -lung disease or breathing difficulties  -myasthenia gravis  -Parkinson's disease  -seizures or a history of seizures  -suicidal thoughts  -an unusual or allergic reaction to lorazepam, other benzodiazepines, foods, dyes, or preservatives  -pregnant or trying to get  pregnant  -breast-feeding  How should I use this medicine?  Take this medicine by mouth with a glass of water. Follow the directions on the prescription label. If it upsets your stomach, take it with food or milk. Take your medicine at regular intervals. Do not take it more often than directed. Do not stop taking except on the advice of your doctor or health care professional.  Talk to your pediatrician regarding the use of this medicine in children. Special care may be needed.  Overdosage: If you think you have taken too much of this medicine contact a poison control center or emergency room at once.  NOTE: This medicine is only for you. Do not share this medicine with others.  What if I miss a dose?  If you miss a dose, take it as soon as you can. If it is almost time for your next dose, take only that dose. Do not take double or extra doses.  What may interact with this medicine?  -barbiturate medicines for inducing sleep or treating seizures, like phenobarbital  -clozapine  -medicines for depression, mental problems or psychiatric disturbances  -medicines for sleep  -phenytoin  -probenecid  -theophylline  -valproic acid  This list may not describe all possible interactions. Give your health care provider a list of all the medicines, herbs, non-prescription drugs, or dietary supplements you use. Also tell them if you smoke, drink alcohol, or use illegal drugs. Some items may interact with your medicine.  What should I watch for while using this medicine?  Visit your doctor or health care professional for regular checks on your progress. Your body may become dependent on this medicine, ask your doctor or health care professional if you still need to take it. However, if you have been taking this medicine regularly for some time, do not suddenly stop taking it. You must gradually reduce the dose or you may get severe side effects. Ask your doctor or health care professional for advice before increasing or  decreasing the dose. Even after you stop taking this medicine it can still affect your body for several days.  You may get drowsy or dizzy. Do not drive, use machinery, or do anything that needs mental alertness until you know how this medicine affects you. To reduce the risk of dizzy and fainting spells, do not stand or sit up quickly, especially if you are an older patient. Alcohol may increase dizziness and drowsiness. Avoid alcoholic drinks.  Do not treat yourself for coughs, colds or allergies without asking your doctor or health care professional for advice. Some ingredients can increase possible side effects.  What side effects may I notice from receiving this medicine?  Side effects that you should report to your doctor or health care professional as soon as possible:  -changes in vision  -confusion  -depression  -mood changes, excitability or aggressive behavior  -movement difficulty, staggering or jerky movements  -muscle cramps  -restlessness  -weakness or tiredness  Side effects that usually do not require medical attention (report to your doctor or health care professional if they continue or are bothersome):  -constipation or diarrhea  -difficulty sleeping, nightmares  -dizziness, drowsiness  -headache  -nausea, vomiting  This list may not describe all possible side effects. Call your doctor for medical advice about side effects. You may report side effects to FDA at 7-186-FDA-6501.  Where should I keep my medicine?  Keep out of the reach of children. This medicine can be abused. Keep your medicine in a safe place to protect it from theft. Do not share this medicine with anyone. Selling or giving away this medicine is dangerous and against the law.  Store at room temperature between 20 and 25 degrees C (68 and 77 degrees F). Protect from light. Keep container tightly closed. Throw away any unused medicine after the expiration date.  NOTE: This sheet is a summary. It may not cover all possible  information. If you have questions about this medicine, talk to your doctor, pharmacist, or health care provider.  © 2014, [employer]vier/[employer] Standard. (6/19/2009 2:58:20 PM)

## 2017-07-14 NOTE — DISCHARGE PLANNING
Spoke to [facility]. [facility] set for 11AM.  Notifed bedside nurse.  SW informed that pt needs a catheter removed. SW called [facility] to postpone transfer.  [facility] is now scheduled for noon.  Pt's  notified.

## 2017-07-14 NOTE — PROGRESS NOTES
Bedside report received from ZAK Mccracken. POC discussed with pt; all questions answered at this time. White board updated. Appropriate functioning of tele monitor confirmed. All needs met at this time.

## 2017-07-14 NOTE — DISCHARGE PLANNING
Transport has been arranged with Ramez at [facility] for 11:00 today via [facility] transport. Pt to discharge to her home in [place]. MONSERRAT Holland notified.

## 2017-07-14 NOTE — DISCHARGE PLANNING
Pt's  called to see when the hospice team will be out to pt's residence. SW called hospice and was informed that a hospice team member was in transit to patient's home. SW called pt's  to inform him that someone would be there soon.

## 2017-07-14 NOTE — DISCHARGE PLANNING
Completed POLST given to bedside nurse, Nawaf.  Nurse will give POLST to [facility].  Pt's  notified of noon transport time with [facility].

## 2017-07-14 NOTE — PROGRESS NOTES
Bedside report received. POC discussed with RN; pt sleeping at this time. Plan is D/C home with home hospice today.

## 2017-07-14 NOTE — DISCHARGE PLANNING
Received [facility] form, form was faxed to [facility] to arrange transport.

## 2017-07-22 PROCEDURE — S9126 HOSPICE CARE, IN THE HOME, P: HCPCS

## 2017-07-23 PROCEDURE — S9126 HOSPICE CARE, IN THE HOME, P: HCPCS

## 2017-08-04 NOTE — CONSULTS
DATE OF SERVICE:  06/28/2017    DATE OF CONSULTATION:  06/28/2017    REFERRING PHYSICIAN:  Greg Diaz MD.    REASON FOR THE CONSULT:  Melena.    HISTORY OF PRESENT ILLNESS:  The patient is an 82-year-old female who was   admitted on June 21st with a history of abdominal pain with a prior month   jaundice.  It is reported that she quit drinking alcohol 1 month ago.  She has   been having nausea, no vomiting, and a decreased appetite.  She has had   increasing abdominal distension.    ALLERGIES:  No known allergies.    HOME MEDICATIONS PRIOR TO ADMISSION:  None.    MEDICAL ILLNESSES:  No history prior to admission.    SOCIAL HISTORY:  She lives with her .  I am unable to ascertain how   much alcohol she was drinking prior to quitting.    FAMILY HISTORY:  Negative.    REVIEW OF SYSTEMS:  I am unable to obtain accurately due to patient's   lethargy.    PHYSICAL EXAMINATION:  VITAL SIGNS:  Afebrile, pulse 81, blood pressure 114/65.  GENERAL:  She is awake and alert, not necessarily oriented.  HEENT:  Pupils are round.  Sclerae are icteric.  No oral lesions.  NECK:  Soft.  No adenopathy.  LUNGS:  Clear.  CARDIOVASCULAR:  S1 and S2 with murmur.  ABDOMEN:  Distended, bowel sounds present.  EXTREMITIES:  No clubbing, cyanosis, peripheral edema.  SKIN:  Smooth, moist, and warm.    LABORATORY DATA:  June 21st, WBC 1.9, hemoglobin 11.4, hematocrit 36.5,   platelets 92.  June 27th, WBC 12.5, hemoglobin 9.2, hematocrit 29, platelets   101.  June 21st, BUN 84, creatinine 1.31.  June 27th, BUN 60, creatinine 3.06.    , , total bilirubin 8.3, albumin 2.2, INR 2.13, alpha   fetoprotein 3.  Hepatitis C antibody positive.    IMAGING:  Ultrasound of gallbladder shows cirrhosis, gallbladder sludge, no   DVT and perihepatic ascites.  June 23rd, CT of the abdomen and pelvis with   contrast, gastroesophageal varices, 1.3 cm pancreatic cyst, 6.9 mm head of the   pancrease lesion.  Large amount of  ascites.    IMPRESSION:  1.  Decompensated cirrhosis and MELD score 35.  It is presumed it is alcohol   and she has hepatitis C antibody positive, but has not been confirmed for   chronic hepatitis C.  Alcoholic hepatitis.  The possibility of AST and ALT   ratio is not greater than 2.  2.  Abdominal pain.  Possibly this includes spontaneous bacterial peritonitis,   a little bit of cause alcoholic hepatitis.  She has no ductal dilation, no   pericholecystic fluid and no cholelithiasis.  3.  Melena x1 yesterday.  She was noted to have gastroesophageal varices on   CT.  4.  Large amount of ascites.  5.  Acute kidney injury.  6.  Confusion.  7.  Abnormal liver test.  8.  Thrombocytopenia.  9.  Coagulopathy.  10.  Microcytic anemia.  11.  Small left lobe liver lesion x2 with normal alpha fetoprotein.  12.  Pancreatic cyst in the region of the head of the pancreas.  13.  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    RECOMMENDATIONS:  1.  She needs a diagnostic paracentesis to rule out SBP.  I would not remove   more than a few 100 mL and I would inoculate the blood culture bottles at the   bedside to increase our yield.  2.  Okay to continue proton pump inhibitor and octreotide, although she is not   having any camilla hematemesis.  3.  Nephrology to evaluate for hemodialysis in the morning.  4.  Rifaximin 550 mg by mouth twice daily.  Lactulose was stopped due to loose   stools.  5.  We will reevaluate in the morning.       ____________________________________     MD GARY JEAN / MAYRA    DD:  08/03/2017 22:28:05  DT:  08/03/2017 22:59:34    D#:  [id number]  Job#:  [id number]

## 2019-05-08 NOTE — PROGRESS NOTES
[facility] Interval Note    Name Abigail Bradley     1935   Age/Sex 82 y.o. female   MRN [id number]   Code Status DNR     After 5PM or if no immediate response to page, please call for cross-coverage  Attending/Team: Dr. Crain/Melchor Use Tiger Text to page  6AM-5PM  Call [phone] to page afterhours   1st Call - Day Intern (R1):   Dr. Barrientos 2nd Call - Day Sr. Resident (R2/R3):   Dr. Bennett         Chief complaint/ reason for interval visit (Primary Diagnosis)   Advanced Cirrhosis, SBP, Acute Kidney Failure requiring H/D    HPI  Patient seen and examined at bedside. She is lying in bed and is awake and alert. She states that she feels good and does not have any pain currently. She is scheduled for HD today.       Interval Problem Daily Status Update    Thrombocytopenia  -Platelets are increasing, currently 26    Acute Kidney Failure  -Scheduled for HD today, urine output 100mL for alst 24 hours    SBP  -Currently on Zosyn and Vancomycin    Review of Systems   Constitutional: Positive for malaise/fatigue. Negative for fever, chills and weight loss.   HENT: Negative for hearing loss and nosebleeds.    Eyes: Negative for blurred vision, double vision and photophobia.   Respiratory: Negative for cough, hemoptysis and sputum production.    Cardiovascular: Negative for chest pain, palpitations, claudication and leg swelling.   Gastrointestinal: Negative for heartburn, nausea, vomiting, abdominal pain and blood in stool.   Genitourinary: Negative for dysuria, urgency and frequency.   Musculoskeletal: Negative for myalgias, back pain and neck pain.   Skin: Negative for itching and rash.   Neurological: Positive for dizziness and weakness. Negative for tingling, sensory change, focal weakness, seizures and headaches.   Endo/Heme/Allergies: Bruises/bleeds easily.   Psychiatric/Behavioral: Negative for depression and suicidal ideas.    Mary Jo Coates Patient Age: 64 year old  MESSAGE:   Patient calling with the following information for Flori Resendez   [phone]    No further information was given states that Flori should know what the information is for. Message confirmed with caller      Next and Last Visit with Provider and Department  Last visit with this provider: 5/7/2019  Last visit with this department: 5/7/2019    Next visit with this provider: Visit date not found  Next visit with this department: Visit date not found    WEIGHT AND HEIGHT:   Wt Readings from Last 1 Encounters:   04/15/19 60.7 kg (133 lb 12.8 oz)     Ht Readings from Last 1 Encounters:   04/15/19 5' 2.5\" (1.588 m)     BMI Readings from Last 1 Encounters:   04/15/19 24.08 kg/m²       ALLERGIES:  Penicillins  Current Outpatient Medications   Medication   • amLODIPine (NORVASC) 10 MG tablet   • Coenzyme Q10 (CO Q 10 PO)     No current facility-administered medications for this visit.      PHARMACY to use: n/a          Pharmacy preference(s) on file:   [id number] - [place], [place] - [address] [place] [id number]  Phone: [phone] Fax: [phone]      CALL BACK INFO: DO NOT LEAVE A MESSAGE - contact patient directly  ROUTING: ROUTE TO COVERING PHYSICIAN for response.        PCP: Kaela Dudley MD         INS: Payor: [employer] / Plan: CHOICE [id number] / Product Type: POS MISC   PATIENT ADDRESS:  [address] [place] [address] [address] [place] [id number]           Consultants/Specialty  GI  Nephro  Palliative    Disposition  Inpatient    Quality Measures  Medications reviewed and Labs reviewed  العراقي Catheter: العراقي placed, replaced on 07/06.  : HD line placed in Right IJ.    DVT Prophylaxis: Contraindicated - High bleeding risk  DVT prophylaxis - mechanical: SCDs    : Zosyn and Vancomycin.            Physical Exam       Filed Vitals:    07/07/17 2000 07/07/17 2305 07/08/17 0400 07/08/17 0755   BP: 137/73 143/67 132/70 140/66   Pulse: 77 77 75 84   Temp: 36.2 °C (97.1 °F) 36.2 °C (97.2 °F) 36.2 °C (97.2 °F) 36.2 °C (97.2 °F)   Resp: 28 20 20 22   Height:       Weight: 75.9 kg (167 lb 5.3 oz)      SpO2: 97% 94% 95% 93%     Body mass index is 29.65 kg/(m^2). Weight: 75.9 kg (167 lb 5.3 oz)  Oxygen Therapy:  Pulse Oximetry: 93 %, O2 (LPM): 0, O2 Delivery: None (Room Air)    Physical Exam   Constitutional: No distress.   HENT:   Head: Normocephalic and atraumatic.   Eyes: Conjunctivae and EOM are normal. Pupils are equal, round, and reactive to light. Scleral icterus is present.   Neck: Normal range of motion. Neck supple. No JVD present. No tracheal deviation present. No thyromegaly present.   Cardiovascular: Normal rate and regular rhythm.    Pulmonary/Chest: Effort normal and breath sounds normal. No respiratory distress. She has no wheezes. She has no rales.   Abdominal: Soft. Bowel sounds are normal. She exhibits distension. There is no tenderness. There is no guarding.   Musculoskeletal: Normal range of motion. She exhibits no edema or tenderness.   Neurological: She is alert. No cranial nerve deficit.   Skin: No rash noted. She is not diaphoretic. No erythema.   Slight bruising on right and left forearms,    Psychiatric: Mood and affect normal.         Lab Data Review:      7/8/2017  12:27 PM    Recent Labs      07/06/17   0317  07/07/17   0203  07/08/17   0240   SODIUM  143  140  138   POTASSIUM  3.5*  3.7  3.9   CHLORIDE  107  104  107   CO2  24  24  22   BUN  43*   31*  40*   CREATININE  2.28*  1.95*  2.45*   CALCIUM  8.9  8.9  9.0       Recent Labs      07/06/17 0317 07/07/17 0203  07/08/17   0240   ALTSGPT  53*  60*  61*   ASTSGOT  69*  84*  84*   ALKPHOSPHAT  137*  130*  136*   TBILIRUBIN  7.0*  8.0*  6.8*   GLUCOSE  107*  82  101*       Recent Labs      07/06/17 0317 07/07/17 0203  07/08/17   0240   RBC  2.58*  2.54*  2.31*   HEMOGLOBIN  8.6*  8.4*  7.8*   HEMATOCRIT  27.5*  27.2*  25.1*   PLATELETCT  21*  23*  26*       Recent Labs      07/06/17 0317 07/07/17 0203 07/08/17   0240   WBC  10.9*  12.9*  12.0*   NEUTSPOLYS  90.20*  92.20*  82.90*   LYMPHOCYTES  4.50*  2.60*  6.30*   MONOCYTES  5.30  3.50  8.60   EOSINOPHILS  0.00  0.80  1.70   BASOPHILS  0.00  0.00  0.20   ASTSGOT  69*  84*  84*   ALTSGPT  53*  60*  61*   ALKPHOSPHAT  137*  130*  136*   TBILIRUBIN  7.0*  8.0*  6.8*           Assessment/Plan     Thrombocytopenia (CMS-[facility]) (present on admission)  Assessment & Plan  Platelets increased 21 to 23 today  Serotonin Assay negative,   Will still continue to hold all heparin due to high bleeding risk, talked to nephrology and they can use their discretion regarding heparin use for dialysis  Will continue to monitor    Sepsis (CMS-[facility]) (present on admission)  Assessment & Plan  Source of infection is likely UTI/SBP  Blood cultures identified E.coli in 2/2 bottles on 06/21  Results from ascites fluid culture negative, morphology showed >250 PMNs  C diff PCR toxin taken on 7/1 are negative  Blood cultures taken on 06/30 were negative   WBC increased today, but overall trend is downwards, will monitor  Continue Zosyn and Vancomycin      Acute kidney injury (CMS-[facility]) (present on admission)  Assessment & Plan  Most likely etiology is contrast induced nephropathy. However ATN or hepatorenal syndrome could be another possibility.   FeNa is 0.11% showing pre-renal.   Urine eosonophils are negative.  Daily urine output around 300-400cc.   Nephro team onboard.  Will do 3 times/week HD now, most recent was on 07/06      Urinary tract infection (present on admission)  Assessment & Plan  UA revealed the presence of a UTI.  Zosyn/Vanco for SBP should also cover her UTI.   Urine cultures discarded as they were non-specific  العراقي Catheter changed on 7/06/2017    Hyperbilirubinemia (present on admission)  Assessment & Plan  T. Bili elevated at 10.  50% of the elevation is indirect bilirubin and 50% is direct bilirubin indicating intrahepatic process.  Likely secondary to alcoholic vs viral hepatitis and/or cirrhosis.  Will continue to monitor.  MRCP does not reveal any obstruction.    Alcoholic cirrhosis of liver with ascites (CMS-[facility]) (present on admission)  Assessment & Plan  Likely alcohol related from chronic alcohol abuse vs hepatitis C.  Cirrhosis was identified on ultrasound.  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  Underwent a paracentesis with IR on 7/1 which drained 4L  Fluid showed 500 WBC with 86% neutrophils  Culture is negative  Keep current Zosyn/Vanco for SBP.     Hepatitis C antibody test positive (present on admission)  Assessment & Plan  Likely has had exposure in the past.  HCR RNA PCR indicates viral load.  May consider treatment down the line as an outpatient.    Altered mental status  Assessment & Plan  Stable on 07/06   Palliative team will meet with patient when  is present      Alcoholic hepatitis (present on admission)  Assessment & Plan  Patient's liver enzymes elevated but patient's last drink was a month ago.  Could be having alcoholic hepatitis since a month.  INR is elevated and T. Bili is at 10.  Maddrey's discriminant function is 60.6 which indicates steroid use to reduce mortality but it is unclear how much bilirubin is from the hepatitis vs biliary obstruction.  In any case starting steroids is contraindicated as patient is septic.  On pentoxifylline at renal dosing.  Also on rifaximin and  lactulose. Ammonia has been stable since admission.  Gastroenterology following, appreciate recommendations.    Excoriation of buttock (present on admission)  Assessment & Plan  Etiology from being bed bound  Doesn't appear to need antibiotics as it does not look infected  Wound care consult in place

## 2022-10-27 NOTE — CARE PLAN
Problem: Bowel/Gastric:  Goal: Normal bowel function is maintained or improved  Outcome: PROGRESSING SLOWER THAN EXPECTED  Patient continues to receive lactulose, patient has BMS in place.     Problem: Pain Management  Goal: Pain level will decrease to patient’s comfort goal  Outcome: PROGRESSING AS EXPECTED  Patient has no complaints of pain at this time.         
Problem: Communication  Goal: The ability to communicate needs accurately and effectively will improve  Intervention: Educate patient and significant other/support system about the plan of care, procedures, treatments, medications and allow for questions  Patient's white board is updated. Patient is updated on plan of care. All questions were answered.       Problem: Knowledge Deficit  Goal: Knowledge of disease process/condition, treatment plan, diagnostic tests, and medications will improve  Intervention: Explain information regarding disease process/condition, treatment plan, diagnostic tests, and medications and document in education  Pt educated about disease process and plan of care for the day.  Pt verbalized understanding.           
Problem: Communication  Goal: The ability to communicate needs accurately and effectively will improve  Intervention: Reorient patient to environment as needed  Reorient pt to situation and time as needed and with each encounter to ensure safety.           
Problem: Communication  Goal: The ability to communicate needs accurately and effectively will improve  Intervention: Use communication aids and/or /Language Line as appropriate  White board updated with POC and care team information during bedside report.      Problem: Safety  Goal: Will remain free from falls  Fall precautions in place. Bed in lowest position. Non-skid socks in place. Personal possessions within reach. Mobility sign on door. Bed-alarm on. Call light within reach. Pt educated regarding fall prevention and states understanding.         
Problem: Communication  Goal: The ability to communicate needs accurately and effectively will improve  Outcome: NOT MET  Educated pt on POC and medications. No learning evident.     Problem: Safety  Goal: Will remain free from injury  Outcome: PROGRESSING AS EXPECTED  Bedside table and call light are within reach. Bed is locked and in the lowest position. Treaded socks are on. Patient educated to call for assistance. Hourly rounding and Q2 turns in place.         
Problem: Communication  Goal: The ability to communicate needs accurately and effectively will improve  Outcome: PROGRESSING AS EXPECTED  POC discussed at bedside - patient verbalizes understanding.  Education on medications and procedures provided.   White board updated, patient encouraged to call for all needs. Calls appropriately. No immediate concerns at this time.    contacted for verbal consents (signed with 2 RN's)    contacted for all updated regarding wife.     Problem: Safety  Goal: Will remain free from falls  Outcome: PROGRESSING AS EXPECTED  tredded socks on patient, call light in reach, personal possessions in available/in reach, hourly rounding in place. Education regarding bed alarm provided. Patient verbalizes understanding. Patient remains free of falls this shift.         Problem: Skin Integrity  Goal: Risk for impaired skin integrity will decrease  Outcome: PROGRESSING AS EXPECTED  Waffle mattress on pressure redistribution mattress. WOC orders in place and followed. Dressing change on 6/28/2017. Sanguinous drainage noted on IAD region of sacrum. [id number] in place.     Problem: Psychosocial Needs:  Goal: Level of anxiety will decrease  Outcome: PROGRESSING AS EXPECTED  POC discussed. Orientation to place, situation, and time provided.         
Problem: Communication  Goal: The ability to communicate needs accurately and effectively will improve  Outcome: PROGRESSING AS EXPECTED  POC discussed with pt. All medications explained. All questions answered.     Problem: Infection  Goal: Will remain free from infection  Outcome: PROGRESSING AS EXPECTED  Vital signs stable. Pt and family educated on proper hand hygiene. Scrub access port for at least 15 seconds. Abx therapy in use            
Problem: Communication  Goal: The ability to communicate needs accurately and effectively will improve  Outcome: PROGRESSING SLOWER THAN EXPECTED  POC and medications discussed with pt.     Problem: Mobility  Goal: Risk for activity intolerance will decrease  Outcome: PROGRESSING SLOWER THAN EXPECTED  Active and passive range of motion performed with pt.         
Problem: Infection  Goal: Will remain free from infection  Intervention: Assess signs and symptoms of infection  No s/s of infection noted          
Problem: Infection  Goal: Will remain free from infection  Outcome: PROGRESSING AS EXPECTED  IV abx therapy administered as prescribed    Problem: Bowel/Gastric:  Goal: Normal bowel function is maintained or improved  Outcome: PROGRESSING AS EXPECTED  Rectal tube patent with loose/brown output    Problem: Mobility  Goal: Risk for activity intolerance will decrease  Outcome: PROGRESSING AS EXPECTED  Turn Q2 and repositioned with pillow support        
Problem: Infection  Goal: Will remain free from infection  Outcome: PROGRESSING AS EXPECTED  IV abx therapy infused    Problem: Bowel/Gastric:  Goal: Normal bowel function is maintained or improved  Outcome: PROGRESSING SLOWER THAN EXPECTED  Rectal tube present - loose stool    Problem: Skin Integrity  Goal: Risk for impaired skin integrity will decrease  Outcome: PROGRESSING AS EXPECTED  Severe incontinence associated dermatitis noted - barrier cream/wipes, erma/wound-care provided    Problem: Urinary Elimination:  Goal: Ability to reestablish a normal urinary elimination pattern will improve  Outcome: PROGRESSING SLOWER THAN EXPECTED  [place]عر[place] catheter with decreased urinary output present        
Problem: Knowledge Deficit  Goal: Knowledge of disease process/condition, treatment plan, diagnostic tests, and medications will improve  Pt and family educated on POC, all questions answered in regards to disease process, treatment and diet. Pt and family verbalize understanding and voice no further questions at this time.        
Problem: Mobility  Goal: Risk for activity intolerance will decrease  ROM performed with patient q 2 hours.    Problem: Skin Integrity  Goal: Risk for impaired skin integrity will decrease  Patient with extensive excoriation to buttocks, wound care measures in place. Further preventative measures in place.        
Problem: Mobility  Goal: Risk for activity intolerance will decrease  q 2 turns in place. ROM performed with patient. Patient with decreased strength.    Problem: Skin Integrity  Goal: Risk for impaired skin integrity will decrease  Wound protocol in place, additional preventative measures in place.        
Problem: Nutritional:  Goal: Achieve adequate nutritional intake  Patient will consume 50% of meals and supplements   Outcome: MET Date Met:  07/03/17  Pt taking % of meals and supplements        
Problem: Nutritional:  Goal: Achieve adequate nutritional intake  Patient will consume 50% of meals and supplements   Outcome: PROGRESSING SLOWER THAN EXPECTED  Pt took 25% of breakfast and % of boost plus supplement  Pt to discharge home with hospice        
Problem: Nutritional:  Goal: Achieve adequate nutritional intake  Patient will consume 50% of meals and supplements  Outcome: NOT MET        
Problem: Nutritional:  Goal: Achieve adequate nutritional intake  Patient will consume 50% of meals and supplements  Outcome: NOT MET  - encourage intake  - consider starting TF if unable to take 50% of meals        
Problem: Pain Management  Goal: Pain level will decrease to patient’s comfort goal  Outcome: PROGRESSING AS EXPECTED  Pt reporting no pain. WCTM    Problem: Skin Integrity  Goal: Risk for impaired skin integrity will decrease  Outcome: PROGRESSING AS EXPECTED  BMS and jarrett in place. Q2 turns in place. Waffle mattress in place. Heels in float boots. Elbows floated on pillows. Wound team following         
Problem: Respiratory:  Goal: Respiratory status will improve  Outcome: PROGRESSING AS EXPECTED  Patient weaned off oxygen therapy post-dialysis. Pulse oximetry stable. Will continue to monitor.    Problem: Skin Integrity  Goal: Risk for impaired skin integrity will decrease  Outcome: PROGRESSING AS EXPECTED  Mireya-care and barrier cream applied PRN.    Problem: Urinary Elimination:  Goal: Ability to reestablish a normal urinary elimination pattern will improve  Outcome: PROGRESSING AS EXPECTED  Hemodialysis initiated 6/28/17        
Problem: Safety  Goal: Will remain free from falls  Intervention: Implement fall precautions    06/22/17 0800 06/22/17 2000   OTHER   Environmental Precautions --  Treaded Slipper Socks on Patient;Personal Belongings, Wastebasket, Call Bell etc. in Easy Reach;Report Given to Other Health Care Providers Regarding Fall Risk;Bed in Low Position;Communication Sign for Patients & Families;Mobility Assessed & Appropriate Sign Placed   IV Pole on Same Side of Bed as Bathroom Yes --    Bedrails --  Bedrails Closest to Bathroom Down   Chair/Bed Strip Alarm --  Yes - Alarm On               
Problem: Safety  Goal: Will remain free from falls  Outcome: PROGRESSING AS EXPECTED    Problem: Psychosocial Needs:  Goal: Level of anxiety will decrease  Outcome: PROGRESSING AS EXPECTED        
Problem: Safety  Goal: Will remain free from injury  Intervention: Provide assistance with mobility  Fall risk assessed every shift and fall precautions in place.  Pt educated to not get up without assistance.  Verbalized understanding.       Problem: Skin Integrity  Goal: Risk for impaired skin integrity will decrease  Intervention: Assess risk factors for impaired skin integrity and/or pressure ulcers   Case score completed every shift, pt turned every 2 hours, skin assessment complete, adequate nutrition encouraged.            
Problem: Safety  Goal: Will remain free from injury  Intervention: Provide assistance with mobility  Pt instructed to call for help when getting out of bed, pt is confused, does not understand teaching          
Problem: Safety  Goal: Will remain free from injury  Outcome: PROGRESSING AS EXPECTED  Bed alarm on call light in reach hourly rounds being done    Problem: Bowel/Gastric:  Goal: Normal bowel function is maintained or improved  Outcome: NOT MET  Rectal tube in place dressing to buttocks cdi, rectal tube flushed per protocol        
Problem: Safety  Goal: Will remain free from injury  Outcome: PROGRESSING AS EXPECTED  Bed alarm set and call-light within reach. Instructed patient to call for assistance PRN.         Problem: Pain Management  Goal: Pain level will decrease to patient’s comfort goal  Outcome: PROGRESSING AS EXPECTED  Patient turn Q2 and repositioned with pillow support to promote comfort        
Problem: Safety  Goal: Will remain free from injury  Outcome: PROGRESSING AS EXPECTED  Bed alarm set and call-light within reach. Instructed patient to call for assistance PRN. Needs reinforcement.  Goal: Will remain free from falls  Outcome: PROGRESSING AS EXPECTED    Problem: Infection  Goal: Will remain free from infection  Outcome: PROGRESSING AS EXPECTED  IV abx therapy continued    Problem: Mobility  Goal: Risk for activity intolerance will decrease  Outcome: PROGRESSING AS EXPECTED  Turn Q2 and repositioned with pillow support    Problem: Urinary Elimination:  Goal: Ability to reestablish a normal urinary elimination pattern will improve  Outcome: PROGRESSING AS EXPECTED  العراقي catheter        
Problem: Safety  Goal: Will remain free from injury  Outcome: PROGRESSING AS EXPECTED  Bed is locked and in lowest position with treaded socks on and call light within reach. Patient educated regarding safety. Verbalizes understanding and calls appropriately for assistance.     Problem: Knowledge Deficit  Goal: Knowledge of disease process/condition, treatment plan, diagnostic tests, and medications will improve  Outcome: PROGRESSING AS EXPECTED  Pt educated on POC, current medications and doses and disease process. All questions answered.        
Problem: Safety  Goal: Will remain free from injury  Outcome: PROGRESSING AS EXPECTED  Hourly rounding, call light within reach, bed alarm on, bed locked/ placed in lowest position, nonslip footwear on    Problem: Skin Integrity  Goal: Risk for impaired skin integrity will decrease  Outcome: PROGRESSING AS EXPECTED  Assess and monitor skin integrity, turn q2hr in place, freq check for incontinence, apply ashley cream with visco paste as needed with incontinence episodes        
Problem: Safety  Goal: Will remain free from injury  Outcome: PROGRESSING AS EXPECTED  Pt bed alarm set, close to nursing desk, hourly rounds done on this patient, pt remains safe    Problem: Skin Integrity  Goal: Risk for impaired skin integrity will decrease  Outcome: PROGRESSING AS EXPECTED  Pt being turned q 2 hours per protocol        
Problem: Safety  Goal: Will remain free from injury  Outcome: PROGRESSING AS EXPECTED  Pt remains free from falls or injuries. Bed alarm and strip alarm set and in place.         Problem: Venous Thromboembolism (VTW)/Deep Vein Thrombosis (DVT) Prevention:  Goal: Patient will participate in Venous Thrombosis (VTE)/Deep Vein Thrombosis (DVT)Prevention Measures  Outcome: PROGRESSING AS EXPECTED  Pt free from s/sx of DVT. Pt using SCDs for VTE prophylaxis.         
Problem: Safety  Goal: Will remain free from injury  Patient educated on importance of using the call light if in need of assistance. Patient verbalizes understanding. Bed locked in lowest position, non skid socks on, personal belongings and call light within reach, appropriate sign placed on door.    Problem: Mobility  Goal: Risk for activity intolerance will decrease  Patient with q 2 turns and ROM for ambulation.    Problem: Skin Integrity  Goal: Risk for impaired skin integrity will decrease  Patient with notable skin breakdown. Preventative measures in place.        
Problem: Safety  Goal: Will remain free from injury  Pt weak, unable to ambulate. Bed alarm in place. Call light within reach.     Problem: Pain Management  Goal: Pain level will decrease to patient’s comfort goal  Pt denies pain at this time. Will continue to monitor.        
Problem: Skin Integrity  Goal: Risk for impaired skin integrity will decrease  Intervention: Implement precautions to protect skin integrity in collaboration with the interdisciplinary team    06/22/17 0800 06/22/17 2000 06/23/17 0000   OTHER   Skin Preventative Measures --  --  Pillows in Use for Support / Positioning   Bed Types --  Pressure Redistribution Mattress (Atmosair) --    Friction Interventions --  Draw Sheet / Pad Used for Repositioning --    Moisturizers --  Barrier Cream;Barrier Paste --    Activity  --  Bed --    Patient Turns / Repositioning --  --  Patient Turns Self from Side to Side   Assistance / Tolerance for Turning/Repositioning --  Assistance of One --    Patient is Receiving Nutrition --  Oral Intake Adequate --    Nutrition Consult Ordered Yes, Consult has been Placed --  --    Vitamin Therapy in Use --  No --                
Problem: Skin Integrity  Goal: Risk for impaired skin integrity will decrease  Intervention: Implement precautions to protect skin integrity in collaboration with the interdisciplinary team    06/22/17 0800 06/23/17 2000 06/24/17 0400   OTHER   Skin Preventative Measures --  --  Pillows in Use for Support / Positioning   Bed Types --  Pressure Redistribution Mattress (Atmosair) --    Friction Interventions --  Draw Sheet / Pad Used for Repositioning --    Moisturizers --  Barrier Cream;Barrier Paste;Barrier Wipes --    PT / OT Involved in Care --  Physical Therapy Involved;Occupational Therapy Involved --    Activity  --  Bed --    Patient Turns / Repositioning --  --  Supine   Assistance / Tolerance for Turning/Repositioning --  --  Assistance of Two or More   Patient is Receiving Nutrition --  Oral Intake Adequate --    Nutrition Consult Ordered Yes, Consult has been Placed --  --    Vitamin Therapy in Use --  No --                
Problem: Skin Integrity  Goal: Risk for impaired skin integrity will decrease  Intervention: Implement precautions to protect skin integrity in collaboration with the interdisciplinary team    06/22/17 0800 06/24/17 2000   OTHER   Skin Preventative Measures --  Pillows in Use for Support / Positioning   Bed Types --  Pressure Redistribution Mattress (Atmosair)   Friction Interventions --  Draw Sheet / Pad Used for Repositioning   Moisturizers --  Barrier Paste;Barrier Wipes;Containment Devices   Containment Devices --  (urinary catheter)   PT / OT Involved in Care --  Physical Therapy Involved;Occupational Therapy Involved   Activity  --  Bed   Patient Turns / Repositioning --  Right Side   Assistance / Tolerance for Turning/Repositioning --  Assistance of Two or More   Patient is Receiving Nutrition --  Oral Intake Adequate   Nutrition Consult Ordered Yes, Consult has been Placed --    Vitamin Therapy in Use --  No               
Problem: Skin Integrity  Goal: Risk for impaired skin integrity will decrease  Intervention: Implement precautions to protect skin integrity in collaboration with the interdisciplinary team  Q2 turns, full waffle cushion on mattress, pending wound care consult          
Problem: Skin Integrity  Goal: Risk for impaired skin integrity will decrease  Skin assessment complete, skin issues noted, pt encouraged to turn self and will have CNA assist in turning Q2.         
Problem: Skin Integrity  Goal: Risk for impaired skin integrity will decrease  Skin is assessed for integrity throughout the shift. Pt is encouraged to reposition and is turned at least every 2 hours. Pt is kept dry and clean. Wound team orders followed.     Problem: Psychosocial Needs:  Goal: Level of anxiety will decrease  Anxiety triggers identified. Calming techniques provided to patient. Patient is involved in POC and is encouraged to verbalize questions and concerns.         
Problem: Venous Thromboembolism (VTW)/Deep Vein Thrombosis (DVT) Prevention:  Goal: Patient will participate in Venous Thrombosis (VTE)/Deep Vein Thrombosis (DVT)Prevention Measures  Intervention: Ensure patient wears graduated elastic stockings (MARIA A hose) and/or SCDs, if ordered, when in bed or chair (Remove at least once per shift for skin check)    06/23/17 2000   OTHER   Mechanical Prophylaxis SCDs, Sequentials (Intermittent Pneumatic Compression Devices)               
Problem: Venous Thromboembolism (VTW)/Deep Vein Thrombosis (DVT) Prevention:  Goal: Patient will participate in Venous Thrombosis (VTE)/Deep Vein Thrombosis (DVT)Prevention Measures  Outcome: PROGRESSING AS EXPECTED    07/10/17 2100   OTHER   Risk Assessment Score 7   VTE RISK Very High   Mechanical Prophylaxis SCDs, Sequentials (Intermittent Pneumatic Compression Devices)         Problem: Mobility  Goal: Risk for activity intolerance will decrease  Outcome: PROGRESSING AS EXPECTED  Patient is not able to get out of bed at this time. Patient is q2 turns.         
Problem: Venous Thromboembolism (VTW)/Deep Vein Thrombosis (DVT) Prevention:  Goal: Patient will participate in Venous Thrombosis (VTE)/Deep Vein Thrombosis (DVT)Prevention Measures  Outcome: PROGRESSING AS EXPECTED    07/11/17 2000   OTHER   Risk Assessment Score 7   VTE RISK Very High   Mechanical Prophylaxis SCDs, Sequentials (Intermittent Pneumatic Compression Devices)         Problem: Discharge Barriers/Planning  Goal: Patient’s continuum of care needs will be met  Outcome: PROGRESSING AS EXPECTED  Patient and  would like to go home with hospice. Patient family members will need to talk with social work.         
Problem: Venous Thromboembolism (VTW)/Deep Vein Thrombosis (DVT) Prevention:  Goal: Patient will participate in Venous Thrombosis (VTE)/Deep Vein Thrombosis (DVT)Prevention Measures  Outcome: PROGRESSING AS EXPECTED  Discussed SCDs with pt. Pt participating in VTE prevention    Problem: Skin Integrity  Goal: Risk for impaired skin integrity will decrease  Outcome: PROGRESSING AS EXPECTED  Discussed ways to protect the skin, turning pt q2 hours.         
hair removal not indicated

## 2022-12-05 NOTE — PROGRESS NOTES
Pt dc'd home with hospice. IV and monitor removed; monitor room notified. Pt left unit via stretcher with [facility]. Personal belongings with pt when leaving unit. KAYDENSA given discharge instructions prior to leaving unit including prescriptions. Copy of discharge instructions with pt and in the chart.   X Size Of Lesion In Cm: 0